# Patient Record
Sex: FEMALE | Race: WHITE | HISPANIC OR LATINO | Employment: UNEMPLOYED | URBAN - METROPOLITAN AREA
[De-identification: names, ages, dates, MRNs, and addresses within clinical notes are randomized per-mention and may not be internally consistent; named-entity substitution may affect disease eponyms.]

---

## 2017-02-23 ENCOUNTER — OFFICE VISIT (OUTPATIENT)
Dept: URGENT CARE | Facility: PHYSICIAN GROUP | Age: 37
End: 2017-02-23
Payer: COMMERCIAL

## 2017-02-23 VITALS
WEIGHT: 197 LBS | OXYGEN SATURATION: 96 % | BODY MASS INDEX: 33.8 KG/M2 | DIASTOLIC BLOOD PRESSURE: 76 MMHG | SYSTOLIC BLOOD PRESSURE: 128 MMHG | HEART RATE: 76 BPM | RESPIRATION RATE: 16 BRPM | TEMPERATURE: 97.9 F

## 2017-02-23 DIAGNOSIS — J01.01 ACUTE RECURRENT MAXILLARY SINUSITIS: ICD-10-CM

## 2017-02-23 DIAGNOSIS — H66.001 ACUTE SUPPURATIVE OTITIS MEDIA OF RIGHT EAR WITHOUT SPONTANEOUS RUPTURE OF TYMPANIC MEMBRANE, RECURRENCE NOT SPECIFIED: ICD-10-CM

## 2017-02-23 PROCEDURE — 99214 OFFICE O/P EST MOD 30 MIN: CPT | Performed by: FAMILY MEDICINE

## 2017-02-23 RX ORDER — SULFAMETHOXAZOLE AND TRIMETHOPRIM 800; 160 MG/1; MG/1
1 TABLET ORAL EVERY 12 HOURS
Qty: 20 TAB | Refills: 0 | Status: SHIPPED | OUTPATIENT
Start: 2017-02-23 | End: 2017-03-05

## 2017-02-23 ASSESSMENT — ENCOUNTER SYMPTOMS
HEADACHES: 1
COUGH: 0
FOCAL WEAKNESS: 0
WEIGHT LOSS: 0
STRIDOR: 0
SENSORY CHANGE: 0
DIZZINESS: 1
EYE REDNESS: 0
SHORTNESS OF BREATH: 0
EYE DISCHARGE: 0
MYALGIAS: 0

## 2017-02-23 NOTE — MR AVS SNAPSHOT
Heidi Mcgowannez   2017 2:45 PM   Office Visit   MRN: [id number]    Department:  [facility]   Dept Phone:  [phone]    Description:  Female : 1980   Provider:  Issac Diaz M.D.           Reason for Visit     Nasal Congestion x 1 week and ear pain      Allergies as of 2017     Allergen Noted Reactions    Inapsine [Droperidol] 2007       Keflex 2007       Morphine 2007       Penicillins 2007         You were diagnosed with     Acute suppurative otitis media of right ear without spontaneous rupture of tympanic membrane, recurrence not specified   [8297354]       Acute recurrent maxillary sinusitis   [[id number]]         Vital Signs     Blood Pressure Pulse Temperature Respirations Weight Oxygen Saturation    128/76 mmHg 76 36.6 °C (97.9 °F) 16 89.359 kg (197 lb) 96%    Last Menstrual Period Smoking Status                10/17/2016 Passive Smoke Exposure - Never Smoker          Basic Information     Date Of Birth Sex Race Ethnicity Preferred Language    1980 Female White  Origin (Kiswahili,Cypriot,Macedonian,Pipo, etc) English      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   2012 - Present    WPW (Marisela-Parkinson-White syndrome) I45.6   2012 - Present    Mild intermittent asthma without complication J45.20   2015 - Present    Situational depression F43.21   2015 - Present    Cough R05   4/15/2016 - Present    Dyspnea R06.00   4/15/2016 - Present      Health Maintenance        Date Due Completion Dates    IMM PNEUMOCOCCAL 19-64 (ADULT) MEDIUM RISK SERIES (1 of 1 - PPSV23) 1997 ---    PAP SMEAR 1999 ---    IMM INFLUENZA (1) 2016 ---    IMM DTaP/Tdap/Td Vaccine (2 - Td) 2024            Current Immunizations     MMR/Varicella Combined Vaccine  Incomplete    Tdap Vaccine 2014  2:15 PM,  Incomplete      Below and/or attached  are the medications your provider expects you to take. Review all of your home medications and newly ordered medications with your provider and/or pharmacist. Follow medication instructions as directed by your provider and/or pharmacist. Please keep your medication list with you and share with your provider. Update the information when medications are discontinued, doses are changed, or new medications (including over-the-counter products) are added; and carry medication information at all times in the event of emergency situations     Allergies:  INAPSINE - (reactions not documented)     KEFLEX - (reactions not documented)     MORPHINE - (reactions not documented)     PENICILLINS - (reactions not documented)               Medications  Valid as of: February 23, 2017 -  3:03 PM    Generic Name Brand Name Tablet Size Instructions for use    Albuterol Sulfate (Aero Soln) albuterol 108 (90 BASE) MCG/ACT Inhale 2 Puffs by mouth every 6 hours as needed for Shortness of Breath.        Albuterol Sulfate (Aero Soln) albuterol 108 (90 BASE) MCG/ACT Inhale 1-2 Puffs by mouth every 6 hours as needed for Shortness of Breath.        Azelastine HCl (Solution) ASTELIN 137 MCG/SPRAY Spray 1 Spray in nose 2 times a day.        Azithromycin (Tab) ZITHROMAX 250 MG Take 2 tabs on first day, then 1 tab po daily for days 2-5.        Budesonide-Formoterol Fumarate (Aerosol) SYMBICORT 160-4.5 MCG/ACT Inhale 2 Puffs by mouth 2 Times a Day.        Clarithromycin (Tab) BIAXIN 500 MG Take 1 Tab by mouth 2 times a day.        Clarithromycin (Tab) BIAXIN 500 MG Take 1 Tab by mouth 2 times a day.        Fluticasone Propionate (Suspension) FLONASE 50 MCG/ACT Spray 2 Sprays in nose every day.        Guaifenesin-Codeine (Solution) ROBITUSSIN -10 mg/5mL Take 1-2 teaspoons at bedtime prn cough.  No driving. Caution drowsy        Ibuprofen   Take 600 mg by mouth every 6 hours as needed.        Methocarbamol (Tab) ROBAXIN 500 MG Take 1 Tab by mouth  3 times a day as needed.        MethylPREDNISolone (Tab) MEDROL DOSPACK 4 MG Take as directed        MethylPREDNISolone (Tablet Therapy Pack) MEDROL DOSEPAK 4 MG Take as directed.        Omeprazole (CAPSULE DELAYED RELEASE) PRILOSEC 20 MG Take 1 Cap by mouth every day.        PredniSONE (Tab) DELTASONE 10 MG 6 tab po QD X 2 days then 5 tab po QD X 2 days then 4 tab PO QD X 2 days then continue taper down to 1 tab po QD X 2 days        PredniSONE (Tab) DELTASONE 10 MG Take 1 tablet 3 times daily for 5 days, then take 1 tablet 2 times daily for 2 days, then take 1 tablet 1 time daily for 1 day, then take 1/2 tablet daily for 1 day.        Sulfamethoxazole-Trimethoprim (Tab) BACTRIM -160 MG Take 1 Tab by mouth every 12 hours for 10 days.        TraMADol HCl (Tab) ULTRAM 50 MG Take 1-2 Tabs by mouth every 6 hours as needed (pain).        .                 Medicines prescribed today were sent to:     [id number] - [place], [place] - [address] [place] [address]    [address] [place] [address] [place] [address]    Phone: [phone] Fax: [phone]    Open 24 Hours?: No      Medication refill instructions:       If your prescription bottle indicates you have medication refills left, it is not necessary to call your provider’s office. Please contact your pharmacy and they will refill your medication.    If your prescription bottle indicates you do not have any refills left, you may request refills at any time through one of the following ways: The online [email or web address] system (except Urgent Care), by calling your provider’s office, or by asking your pharmacy to contact your provider’s office with a refill request. Medication refills are processed only during regular business hours and may not be available until the next business day. Your provider may request additional information or to have a follow-up visit with you prior to refilling your medication.   *Please Note: Medication refills are assigned a new Rx number when refilled  electronically. Your pharmacy may indicate that no refills were authorized even though a new prescription for the same medication is available at the pharmacy. Please request the medicine by name with the pharmacy before contacting your provider for a refill.           [employer] Access Code: [email or web address]  Expires: 3/9/2017 11:56 AM    [employer]  A secure, online tool to manage your health information     [employer]® is a secure, online tool that connects you to your personalized health information from the privacy of your home -- day or night - making it very easy for you to manage your healthcare. Once the activation process is completed, you can even access your medical information using the [employer] sarah, which is available for free in the Apple Sarah store or Google Play store.     [employer] provides the following levels of access (as shown below):   My Chart Features   [facility] Doctor [facility]  Specialists [facility]  Urgent  Care [facility]  Primary Care  Doctor   Email your healthcare team securely and privately 24/7 X X X    Manage appointments: schedule your next appointment; view details of past/upcoming appointments X      Request prescription refills. X      View recent personal medical records, including lab and immunizations X X X X   View health record, including health history, allergies, medications X X X X   Read reports about your outpatient visits, procedures, consult and ER notes X X X X   See your discharge summary, which is a recap of your hospital and/or ER visit that includes your diagnosis, lab results, and care plan. X X       How to register for [employer]:  1. Go to  [email or web address].  2. Click on the Sign Up Now box, which takes you to the New Member Sign Up page. You will need to provide the following information:  a. Enter your [employer] Access Code exactly as it appears at the top of this page. (You will not need to use this code after you’ve completed the sign-up  process. If you do not sign up before the expiration date, you must request a new code.)   b. Enter your date of birth.   c. Enter your home email address.   d. Click Submit, and follow the next screen’s instructions.  3. Create a [employer]t ID. This will be your [employer] login ID and cannot be changed, so think of one that is secure and easy to remember.  4. Create a [employer] password. You can change your password at any time.  5. Enter your Password Reset Question and Answer. This can be used at a later time if you forget your password.   6. Enter your e-mail address. This allows you to receive e-mail notifications when new information is available in [employer].  7. Click Sign Up. You can now view your health information.    For assistance activating your [employer] account, call [phone]

## 2017-02-23 NOTE — PROGRESS NOTES
Subjective:      Heidi Plata is a 36 y.o. female who presents with Nasal Congestion            Sinusitis  This is a new problem. Episode onset: 1 week sinus pressure/drainage, right earache. No fever. No ST. Right anterior neck pain and feels swollen. +PMH sinusitis/no sinus surgery but has been seen by ENT. Using OTC advil with min relief. Occas nyquil with relief.  Associated symptoms include headaches. Pertinent negatives include no coughing, ear pain or shortness of breath.   No other aggravating or alleviating factors.      Review of Systems   Constitutional: Positive for malaise/fatigue. Negative for weight loss.   HENT: Positive for hearing loss. Negative for ear pain.    Eyes: Negative for discharge and redness.   Respiratory: Negative for cough, shortness of breath and stridor.    Musculoskeletal: Negative for myalgias and joint pain.   Skin: Negative for itching and rash.   Neurological: Positive for dizziness and headaches. Negative for sensory change and focal weakness.   .  Medications, Allergies, and current problem list reviewed today in Epic         Objective:     /76 mmHg  Pulse 76  Temp(Src) 36.6 °C (97.9 °F)  Resp 16  Wt 89.359 kg (197 lb)  SpO2 96%  LMP 10/17/2016     Physical Exam   Constitutional: She appears well-developed and well-nourished.   HENT:   Head: Normocephalic and atraumatic.   Right TM red, dull, bulging    Tender right maxillary sinus bilateral.    Eyes: Conjunctivae are normal.   Neck: Neck supple.   Cardiovascular: Normal rate, regular rhythm and normal heart sounds.    Pulmonary/Chest: Effort normal and breath sounds normal.   Lymphadenopathy:     She has cervical adenopathy (tender anterior).   Neurological:   Speech is clear. Patient is appropriate and cooperative.     Skin: Skin is warm and dry. No rash noted.               Assessment/Plan:     1. Acute suppurative otitis media of right ear without spontaneous rupture of tympanic membrane, recurrence not  specified  sulfamethoxazole-trimethoprim (BACTRIM DS) 800-160 MG tablet   2. Acute recurrent maxillary sinusitis  sulfamethoxazole-trimethoprim (BACTRIM DS) 800-160 MG tablet     Nasal saline, coricidin  Differential diagnosis, natural history, supportive care, and indications for immediate follow-up discussed at length.

## 2017-03-21 ENCOUNTER — OFFICE VISIT (OUTPATIENT)
Dept: URGENT CARE | Facility: PHYSICIAN GROUP | Age: 37
End: 2017-03-21
Payer: COMMERCIAL

## 2017-03-21 ENCOUNTER — HOSPITAL ENCOUNTER (OUTPATIENT)
Facility: MEDICAL CENTER | Age: 37
End: 2017-03-21
Attending: EMERGENCY MEDICINE
Payer: COMMERCIAL

## 2017-03-21 ENCOUNTER — HOSPITAL ENCOUNTER (OUTPATIENT)
Dept: RADIOLOGY | Facility: MEDICAL CENTER | Age: 37
End: 2017-03-21
Attending: EMERGENCY MEDICINE

## 2017-03-21 VITALS
TEMPERATURE: 97.9 F | OXYGEN SATURATION: 96 % | DIASTOLIC BLOOD PRESSURE: 90 MMHG | HEART RATE: 82 BPM | RESPIRATION RATE: 16 BRPM | BODY MASS INDEX: 31.74 KG/M2 | WEIGHT: 185 LBS | SYSTOLIC BLOOD PRESSURE: 124 MMHG

## 2017-03-21 DIAGNOSIS — J00 ACUTE NASOPHARYNGITIS: ICD-10-CM

## 2017-03-21 DIAGNOSIS — R10.30 LOWER ABDOMINAL PAIN: ICD-10-CM

## 2017-03-21 DIAGNOSIS — K52.9 ENTEROCOLITIS: ICD-10-CM

## 2017-03-21 DIAGNOSIS — R19.7 DIARRHEA, UNSPECIFIED TYPE: ICD-10-CM

## 2017-03-21 DIAGNOSIS — R10.813 RIGHT LOWER QUADRANT ABDOMINAL TENDERNESS WITHOUT REBOUND TENDERNESS: ICD-10-CM

## 2017-03-21 DIAGNOSIS — Z87.09 HISTORY OF ALLERGIC RHINITIS: ICD-10-CM

## 2017-03-21 LAB
APPEARANCE UR: CLEAR
BASOPHILS # BLD AUTO: 0.04 K/UL (ref 0–0.12)
BASOPHILS NFR BLD AUTO: 0.4 % (ref 0–1.8)
BILIRUB UR STRIP-MCNC: NORMAL MG/DL
COLOR UR AUTO: NORMAL
EOSINOPHIL # BLD: 0.31 K/UL (ref 0–0.51)
EOSINOPHIL NFR BLD AUTO: 2.8 % (ref 0–6.9)
ERYTHROCYTE [DISTWIDTH] IN BLOOD BY AUTOMATED COUNT: 39.8 FL (ref 35.9–50)
ERYTHROCYTE [SEDIMENTATION RATE] IN BLOOD BY WESTERGREN METHOD: 9 MM/HOUR (ref 0–20)
GLUCOSE UR STRIP.AUTO-MCNC: NORMAL MG/DL
HCT VFR BLD AUTO: 42.5 % (ref 37–47)
HGB BLD-MCNC: 15.4 G/DL (ref 12–16)
IMM GRANULOCYTES # BLD AUTO: 0.04 K/UL (ref 0–0.11)
IMM GRANULOCYTES NFR BLD AUTO: 0.4 % (ref 0–0.9)
INT CON NEG: NEGATIVE
INT CON NEG: NEGATIVE
INT CON POS: POSITIVE
INT CON POS: POSITIVE
KETONES UR STRIP.AUTO-MCNC: NORMAL MG/DL
LEUKOCYTE ESTERASE UR QL STRIP.AUTO: NORMAL
LYMPHOCYTES # BLD: 3.24 K/UL (ref 1–4.8)
LYMPHOCYTES NFR BLD AUTO: 29 % (ref 22–41)
MCH RBC QN AUTO: 29.4 PG (ref 27–33)
MCHC RBC AUTO-ENTMCNC: 36.2 G/DL (ref 33.6–35)
MCV RBC AUTO: 81.3 FL (ref 81.4–97.8)
MONOCYTES # BLD: 1.07 K/UL (ref 0–0.85)
MONOCYTES NFR BLD AUTO: 9.6 % (ref 0–13.4)
NEUTROPHILS # BLD: 6.48 K/UL (ref 2–7.15)
NEUTROPHILS NFR BLD AUTO: 57.8 % (ref 44–72)
NITRITE UR QL STRIP.AUTO: NORMAL
NRBC # BLD AUTO: 0 K/UL
NRBC BLD-RTO: 0 /100 WBC
PH UR STRIP.AUTO: 5 [PH] (ref 5–8)
PLATELET # BLD AUTO: 270 K/UL (ref 164–446)
PMV BLD AUTO: 11.2 FL (ref 9–12.9)
POC URINE PREGNANCY TEST: NORMAL
PROT UR QL STRIP: NORMAL MG/DL
RBC # BLD AUTO: 5.23 M/UL (ref 4.2–5.4)
RBC UR QL AUTO: NORMAL
S PYO AG THROAT QL: NORMAL
SP GR UR STRIP.AUTO: 1.01
UROBILINOGEN UR STRIP-MCNC: 5 MG/DL
WBC # BLD AUTO: 11.2 K/UL (ref 4.8–10.8)

## 2017-03-21 PROCEDURE — 87880 STREP A ASSAY W/OPTIC: CPT | Performed by: EMERGENCY MEDICINE

## 2017-03-21 PROCEDURE — 81002 URINALYSIS NONAUTO W/O SCOPE: CPT | Performed by: EMERGENCY MEDICINE

## 2017-03-21 PROCEDURE — 85652 RBC SED RATE AUTOMATED: CPT

## 2017-03-21 PROCEDURE — 700117 HCHG RX CONTRAST REV CODE 255: Performed by: EMERGENCY MEDICINE

## 2017-03-21 PROCEDURE — 74177 CT ABD & PELVIS W/CONTRAST: CPT

## 2017-03-21 PROCEDURE — 99203 OFFICE O/P NEW LOW 30 MIN: CPT | Performed by: EMERGENCY MEDICINE

## 2017-03-21 PROCEDURE — 81025 URINE PREGNANCY TEST: CPT | Performed by: EMERGENCY MEDICINE

## 2017-03-21 PROCEDURE — 85025 COMPLETE CBC W/AUTO DIFF WBC: CPT

## 2017-03-21 RX ADMIN — IOHEXOL 100 ML: 350 INJECTION, SOLUTION INTRAVENOUS at 18:15

## 2017-03-21 ASSESSMENT — ENCOUNTER SYMPTOMS
HEADACHES: 1
FLATUS: 0
SORE THROAT: 1
CONSTIPATION: 0
SINUS PAIN: 1
BELCHING: 0
HEMATOCHEZIA: 0
COUGH: 1
ABDOMINAL PAIN: 1
FEVER: 0
ANOREXIA: 1
NAUSEA: 0
RHINORRHEA: 1
VOMITING: 0
DIARRHEA: 1

## 2017-03-21 NOTE — PROGRESS NOTES
Subjective:      Heidi Plata is a 36 y.o. female who presents with UTI            Abdominal Pain  This is a new problem. The current episode started yesterday. The onset quality is gradual. The problem occurs intermittently. The problem has been unchanged. The pain is located in the suprapubic region. The pain is moderate. The quality of the pain is colicky. The abdominal pain does not radiate. Associated symptoms include anorexia, diarrhea and headaches. Pertinent negatives include no belching, constipation, dysuria, fever, flatus, frequency, hematochezia, hematuria, melena, nausea or vomiting. Associated symptoms comments: Multiple watery stools without hematochezia. The pain is aggravated by urination. The pain is relieved by being still.   URI   This is a new problem. Episode onset: 4 days. The problem has been unchanged. Maximum temperature: initially. Associated symptoms include abdominal pain, congestion, coughing, diarrhea, headaches, a plugged ear sensation, rhinorrhea, sinus pain and a sore throat. Pertinent negatives include no dysuria, ear pain, nausea, rash or vomiting.    family member exposure to strep throat. No history of irritable bowel or diarrheal illnesses.  Son with ongoing diarrhea problem. Had antibiotic course recently. Known allergic rhinitis without significant recent exacerbation    Review of Systems   Constitutional: Negative for fever.   HENT: Positive for congestion, rhinorrhea and sore throat. Negative for ear pain.    Respiratory: Positive for cough.    Gastrointestinal: Positive for abdominal pain, diarrhea and anorexia. Negative for nausea, vomiting, constipation, melena, hematochezia and flatus.   Genitourinary: Positive for urgency. Negative for dysuria, frequency and hematuria.        LNMP 2 weeks ago.Denies pregnancy. No vaginal discharge or bleeding.   Skin: Negative for itching and rash.   Neurological: Positive for headaches.     PMH:  has a past medical history of S/P  ablation of ventricular arrhythmia; Cruz's syndrome; Other acquired deformity of toe; Personal history of venous thrombosis and embolism; Ramirez-Parkinson-White syndrome; Angina; Bronchitis; ASTHMA; Pneumonia; Kidney, horseshoe; PSVT (paroxysmal supraventricular tachycardia) (CMS-HCC); Cardiac arrhythmia; Chickenpox; and Influenza. She also has no past medical history of Diabetes, CATARACT, COPD, Dialysis, or Backpain.  MEDS:   Current outpatient prescriptions:   •  Ibuprofen (ADVIL PO), Take 600 mg by mouth every 6 hours as needed., Disp: , Rfl:   •  clarithromycin (BIAXIN) 500 MG Tab, Take 1 Tab by mouth 2 times a day., Disp: 20 Tab, Rfl: 0  •  MethylPREDNISolone (MEDROL DOSEPAK) 4 MG Tablet Therapy Pack, Take as directed., Disp: 21 Tab, Rfl: 0  •  azelastine (ASTELIN) 137 MCG/SPRAY nasal spray, Spray 1 Spray in nose 2 times a day., Disp: 30 mL, Rfl: 3  •  methocarbamol (ROBAXIN) 500 MG Tab, Take 1 Tab by mouth 3 times a day as needed., Disp: 30 Tab, Rfl: 0  •  tramadol (ULTRAM) 50 MG Tab, Take 1-2 Tabs by mouth every 6 hours as needed (pain)., Disp: 20 Tab, Rfl: 0  •  omeprazole (PRILOSEC) 20 MG delayed-release capsule, Take 1 Cap by mouth every day., Disp: 30 Cap, Rfl: 0  •  budesonide-formoterol (SYMBICORT) 160-4.5 MCG/ACT Aerosol, Inhale 2 Puffs by mouth 2 Times a Day., Disp: 1 Inhaler, Rfl: 11  •  predniSONE (DELTASONE) 10 MG Tab, Take 1 tablet 3 times daily for 5 days, then take 1 tablet 2 times daily for 2 days, then take 1 tablet 1 time daily for 1 day, then take 1/2 tablet daily for 1 day., Disp: 22 Tab, Rfl: 0  •  predniSONE (DELTASONE) 10 MG Tab, 6 tab po QD X 2 days then 5 tab po QD X 2 days then 4 tab PO QD X 2 days then continue taper down to 1 tab po QD X 2 days, Disp: 42 Tab, Rfl: 0  •  clarithromycin (BIAXIN) 500 MG Tab, Take 1 Tab by mouth 2 times a day., Disp: 20 Tab, Rfl: 0  •  methylPREDNISolone (MEDROL DOSPACK) 4 MG Tab, Take as directed, Disp: 21 Tab, Rfl: 0  •  albuterol (VENTOLIN OR  PROVENTIL) 108 (90 BASE) MCG/ACT Aero Soln inhalation aerosol, Inhale 1-2 Puffs by mouth every 6 hours as needed for Shortness of Breath., Disp: 8.5 g, Rfl: 0  •  guaifenesin-codeine (CHERATUSSIN AC) Solution oral solution, Take 1-2 teaspoons at bedtime prn cough.  No driving. Caution drowsy, Disp: 100 mL, Rfl: 0  •  albuterol (VENTOLIN OR PROVENTIL) 108 (90 BASE) MCG/ACT Aero Soln inhalation aerosol, Inhale 2 Puffs by mouth every 6 hours as needed for Shortness of Breath., Disp: 8.5 g, Rfl: 3  •  azithromycin (ZITHROMAX) 250 MG Tab, Take 2 tabs on first day, then 1 tab po daily for days 2-5., Disp: 6 Tab, Rfl: 0  •  fluticasone (FLONASE) 50 MCG/ACT nasal spray, Spray 2 Sprays in nose every day., Disp: 16 g, Rfl: 2  ALLERGIES:   Allergies   Allergen Reactions   • Inapsine [Droperidol]    • Keflex    • Morphine    • Penicillins      SURGHX:   Past Surgical History   Procedure Laterality Date   • Implantable cardioverter defibrillator (icd)     • Grecia by laparoscopy     • Other cardiac surgery       6 ablation   • Gyn surgery  2007     csection   • Other  2003     L BREAST LUMPECTOMY   • Other       gallbladder removed   • Repeat c section  6/18/2014     Performed by Corinne E Capurro, M.D. at [facility]   • Primary c section     • Cholecystectomy       SOCHX:  reports that she has been passively smoking.  She has never used smokeless tobacco. She reports that she does not drink alcohol or use illicit drugs.  FH: family history includes Asthma in her mother and son; Lung Disease in her son.       Objective:     /90 mmHg  Pulse 82  Temp(Src) 36.6 °C (97.9 °F)  Resp 16  Wt 83.915 kg (185 lb)  SpO2 96%  LMP 10/17/2016     Physical Exam   Constitutional: She is oriented to person, place, and time. She appears well-developed and well-nourished. She is cooperative.  Non-toxic appearance. She does not have a sickly appearance. She appears ill. No distress.   HENT:   Head: Normocephalic.   Right Ear:  Tympanic membrane and ear canal normal.   Left Ear: Tympanic membrane and ear canal normal.   Nose: Mucosal edema present. Right sinus exhibits maxillary sinus tenderness and frontal sinus tenderness. Left sinus exhibits maxillary sinus tenderness and frontal sinus tenderness.   Mouth/Throat: Uvula is midline. No trismus in the jaw. No uvula swelling. Posterior oropharyngeal erythema present. No oropharyngeal exudate, posterior oropharyngeal edema or tonsillar abscesses.   Eyes: Conjunctivae are normal. No scleral icterus.   Neck: Trachea normal and phonation normal. Neck supple. No JVD present. No thyromegaly present.   Cardiovascular: Normal rate, regular rhythm and normal heart sounds.    Pulmonary/Chest: Effort normal. She has no decreased breath sounds. She has no wheezes. She has no rhonchi. She has no rales.   Abdominal: Soft. Normal appearance. She exhibits no distension, no abdominal bruit and no mass. Bowel sounds are decreased. There is no hepatosplenomegaly. There is tenderness in the right lower quadrant. There is guarding and tenderness at McBurney's point. There is no rigidity, no rebound, no CVA tenderness and negative Montgomery's sign.   Musculoskeletal:   No significant pedal edema.   Lymphadenopathy:     She has cervical adenopathy.        Right cervical: Superficial cervical adenopathy present. No posterior cervical adenopathy present.       Left cervical: Superficial cervical adenopathy present. No posterior cervical adenopathy present.   Neurological: She is alert and oriented to person, place, and time.   Skin: Skin is warm, dry and intact. No rash noted.   Psychiatric: She has a normal mood and affect. Her speech is normal.               Assessment/Plan:     1. Enterocolitis  Advised need for PCP follow-up  2. Right lower quadrant abdominal tenderness without rebound tenderness  - CBC WITH DIFFERENTIAL; Future  [facility]; Future  - CT-ABDOMEN-PELVIS WITH; per radiologist:   1.  No  acute abnormalities are noted on abdominal CT.  No acute abnormalities are identified on pelvic CT.  2.  Mildly prominent right adnexal and ovarian veins could indicate pelvic congestion syndrome. These veins appear slightly more prominent than on the prior CT.    3. Lower abdominal pain  Negative- POCT Urinalysis  Negative- POCT Pregnancy    4. Diarrhea, unspecified type  - OCCULT BLOOD FECES IMMUNOASSAY; Future  - CDIFF BY PCR; Future  - STOOL WBC'S; Future    5. Acute nasopharyngitis  Negative- POCT Rapid Strep A    6. History of allergic rhinitis

## 2017-03-21 NOTE — MR AVS SNAPSHOT
Heidi Plata   3/21/2017 2:30 PM   Office Visit   MRN: [id number]    Department:  [facility]   Dept Phone:  [phone]    Description:  Female : 1980   Provider:  Rell Davis M.D.           Reason for Visit     UTI lower abd pain/lower back pain, cant urinate that much/ sore throat, sinus congestion      Allergies as of 3/21/2017     Allergen Noted Reactions    Inapsine [Droperidol] 2007       Keflex 2007       Morphine 2007       Penicillins 2007         You were diagnosed with     Enterocolitis   [424330]       Right lower quadrant abdominal tenderness without rebound tenderness   [2808554]       Lower abdominal pain   [053930]       Diarrhea, unspecified type   [7399604]       Acute nasopharyngitis   [750997]       History of allergic rhinitis   [994021]         Vital Signs     Blood Pressure Pulse Temperature Respirations Weight Oxygen Saturation    124/90 mmHg 82 36.6 °C (97.9 °F) 16 83.915 kg (185 lb) 96%    Last Menstrual Period Smoking Status                10/17/2016 Passive Smoke Exposure - Never Smoker          Basic Information     Date Of Birth Sex Race Ethnicity Preferred Language    1980 Female White  Origin (Marshallese,Syrian,Ugandan,South African, etc) English      Your appointments     Mar 21, 2017  5:30 PM   CT BODY WITH with [place] [facility] 1   [facility] [place] [facility]    [address] [place] [address] [address]ky  [facility] [address]   [phone]           Taking medications as regularly scheduled is strongly encouraged.  *For Abdominal CT-Patient needs to  oral contrast and instruction from the department at least 2 hours prior to exam. Patient may  contrast at any imaging facility.              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   2012 - Present    WPW (Marisela-Parkinson-White syndrome) I45.6   2012 - Present    Mild intermittent asthma without  complication J45.20   12/30/2015 - Present    Situational depression F43.21   12/30/2015 - Present    Cough R05   4/15/2016 - Present    Dyspnea R06.00   4/15/2016 - Present      Health Maintenance        Date Due Completion Dates    IMM PNEUMOCOCCAL 19-64 (ADULT) MEDIUM RISK SERIES (1 of 1 - PPSV23) 5/17/1997 ---    PAP SMEAR 5/17/1999 ---    IMM INFLUENZA (1) 9/1/2016 ---    IMM DTaP/Tdap/Td Vaccine (2 - Td) 6/19/2024 6/19/2014            Results     POCT Urinalysis      Component Value Standard Range & Units    POC Color dark yellow Negative    POC Appearance clear Negative    POC Leukocyte Esterase neg Negative    POC Nitrites neg Negative    POC Urobiligen 5.0 Negative (0.2) mg/dL    POC Protein neg Negative mg/dL    POC Urine PH 5.0 5.0 - 8.0    POC Blood neg Negative    POC Specific Gravity 1.015 <1.005 - >1.030    POC Ketones neg Negative mg/dL    POC Biliruben neg Negative mg/dL    POC Glucose neg Negative mg/dL                POCT Pregnancy      Component Value Standard Range & Units    POC Urine Pregnancy Test neg Negative    Internal Control Positive Positive     Internal Control Negative Negative                         Current Immunizations     MMR/Varicella Combined Vaccine  Incomplete    Tdap Vaccine 6/19/2014  2:15 PM,  Incomplete      Below and/or attached are the medications your provider expects you to take. Review all of your home medications and newly ordered medications with your provider and/or pharmacist. Follow medication instructions as directed by your provider and/or pharmacist. Please keep your medication list with you and share with your provider. Update the information when medications are discontinued, doses are changed, or new medications (including over-the-counter products) are added; and carry medication information at all times in the event of emergency situations     Allergies:  INAPSINE - (reactions not documented)     KEFLEX - (reactions not documented)     MORPHINE -  (reactions not documented)     PENICILLINS - (reactions not documented)               Medications  Valid as of: March 21, 2017 -  3:35 PM    Generic Name Brand Name Tablet Size Instructions for use    Albuterol Sulfate (Aero Soln) albuterol 108 (90 BASE) MCG/ACT Inhale 2 Puffs by mouth every 6 hours as needed for Shortness of Breath.        Albuterol Sulfate (Aero Soln) albuterol 108 (90 BASE) MCG/ACT Inhale 1-2 Puffs by mouth every 6 hours as needed for Shortness of Breath.        Azelastine HCl (Solution) ASTELIN 137 MCG/SPRAY Spray 1 Spray in nose 2 times a day.        Azithromycin (Tab) ZITHROMAX 250 MG Take 2 tabs on first day, then 1 tab po daily for days 2-5.        Budesonide-Formoterol Fumarate (Aerosol) SYMBICORT 160-4.5 MCG/ACT Inhale 2 Puffs by mouth 2 Times a Day.        Clarithromycin (Tab) BIAXIN 500 MG Take 1 Tab by mouth 2 times a day.        Clarithromycin (Tab) BIAXIN 500 MG Take 1 Tab by mouth 2 times a day.        Fluticasone Propionate (Suspension) FLONASE 50 MCG/ACT Spray 2 Sprays in nose every day.        Guaifenesin-Codeine (Solution) ROBITUSSIN -10 mg/5mL Take 1-2 teaspoons at bedtime prn cough.  No driving. Caution drowsy        Ibuprofen   Take 600 mg by mouth every 6 hours as needed.        Methocarbamol (Tab) ROBAXIN 500 MG Take 1 Tab by mouth 3 times a day as needed.        MethylPREDNISolone (Tab) MEDROL DOSPACK 4 MG Take as directed        MethylPREDNISolone (Tablet Therapy Pack) MEDROL DOSEPAK 4 MG Take as directed.        Omeprazole (CAPSULE DELAYED RELEASE) PRILOSEC 20 MG Take 1 Cap by mouth every day.        PredniSONE (Tab) DELTASONE 10 MG 6 tab po QD X 2 days then 5 tab po QD X 2 days then 4 tab PO QD X 2 days then continue taper down to 1 tab po QD X 2 days        PredniSONE (Tab) DELTASONE 10 MG Take 1 tablet 3 times daily for 5 days, then take 1 tablet 2 times daily for 2 days, then take 1 tablet 1 time daily for 1 day, then take 1/2 tablet daily for 1 day.         TraMADol HCl (Tab) ULTRAM 50 MG Take 1-2 Tabs by mouth every 6 hours as needed (pain).        .                 Medicines prescribed today were sent to:     [id number] - [place], [place] - [address] [place] [address]    [address] [place] [address] [place] [address]    Phone: [phone] Fax: [phone]    Open 24 Hours?: No      Medication refill instructions:       If your prescription bottle indicates you have medication refills left, it is not necessary to call your provider’s office. Please contact your pharmacy and they will refill your medication.    If your prescription bottle indicates you do not have any refills left, you may request refills at any time through one of the following ways: The online [employer] system (except Urgent Care), by calling your provider’s office, or by asking your pharmacy to contact your provider’s office with a refill request. Medication refills are processed only during regular business hours and may not be available until the next business day. Your provider may request additional information or to have a follow-up visit with you prior to refilling your medication.   *Please Note: Medication refills are assigned a new Rx number when refilled electronically. Your pharmacy may indicate that no refills were authorized even though a new prescription for the same medication is available at the pharmacy. Please request the medicine by name with the pharmacy before contacting your provider for a refill.        Your To Do List     Future Labs/Procedures Complete By Expires    CBC WITH DIFFERENTIAL  As directed 3/21/2018    CDIFF BY PCR  As directed 3/22/2017    CT-ABDOMEN-PELVIS WITH  As directed 3/21/2018    OCCULT BLOOD FECES IMMUNOASSAY  As directed 3/21/2018    STOOL WBC'S  As directed 3/21/2018    WESTERGREN SED RATE  As directed 3/21/2018         [employer] Access Code: [email or web address]  Expires: 4/9/2017  2:35 PM    [employer]  A secure, online tool to manage your health information     [facility]’s  Conjure® is a secure, online tool that connects you to your personalized health information from the privacy of your home -- day or night - making it very easy for you to manage your healthcare. Once the activation process is completed, you can even access your medical information using the Conjure sarah, which is available for free in the Apple Sarah store or Google Play store.     Conjure provides the following levels of access (as shown below):   My Chart Features   [facility] Doctor [facility]  Specialists [facility]  Urgent  Care Non-[facility]  Primary Care  Doctor   Email your healthcare team securely and privately 24/7 X X X    Manage appointments: schedule your next appointment; view details of past/upcoming appointments X      Request prescription refills. X      View recent personal medical records, including lab and immunizations X X X X   View health record, including health history, allergies, medications X X X X   Read reports about your outpatient visits, procedures, consult and ER notes X X X X   See your discharge summary, which is a recap of your hospital and/or ER visit that includes your diagnosis, lab results, and care plan. X X       How to register for Conjure:  1. Go to  [email or web address].  2. Click on the Sign Up Now box, which takes you to the New Member Sign Up page. You will need to provide the following information:  a. Enter your Conjure Access Code exactly as it appears at the top of this page. (You will not need to use this code after you’ve completed the sign-up process. If you do not sign up before the expiration date, you must request a new code.)   b. Enter your date of birth.   c. Enter your home email address.   d. Click Submit, and follow the next screen’s instructions.  3. Create a Conjure ID. This will be your Conjure login ID and cannot be changed, so think of one that is secure and easy to remember.  4. Create a Conjure password. You can change your password at any  time.  5. Enter your Password Reset Question and Answer. This can be used at a later time if you forget your password.   6. Enter your e-mail address. This allows you to receive e-mail notifications when new information is available in [employer].  7. Click Sign Up. You can now view your health information.    For assistance activating your [employer] account, call [phone]

## 2017-03-22 NOTE — PATIENT INSTRUCTIONS
"Go to the nearest hospital Emergency Department, or call 911, if any worsening condition.  You should contact a primary care provider for follow-up as soon as available.Test results will be available in the next 1-2 days; the clinic will contact you with results.    Upper Respiratory Infection, Adult  Most upper respiratory infections (URIs) are caused by a virus. A URI affects the nose, throat, and upper air passages. The most common type of URI is often called \"the common cold.\"  HOME CARE   · Take medicines only as told by your doctor.  · Gargle warm saltwater or take cough drops to comfort your throat as told by your doctor.  · Use a warm mist humidifier or inhale steam from a shower to increase air moisture. This may make it easier to breathe.  · Drink enough fluid to keep your pee (urine) clear or pale yellow.  · Eat soups and other clear broths.  · Have a healthy diet.  · Rest as needed.  · Go back to work when your fever is gone or your doctor says it is okay.  ¨ You may need to stay home longer to avoid giving your URI to others.  ¨ You can also wear a face mask and wash your hands often to prevent spread of the virus.  · Use your inhaler more if you have asthma.  · Do not use any tobacco products, including cigarettes, chewing tobacco, or electronic cigarettes. If you need help quitting, ask your doctor.  GET HELP IF:  · You are getting worse, not better.  · Your symptoms are not helped by medicine.  · You have chills.  · You are getting more short of breath.  · You have brown or red mucus.  · You have yellow or brown discharge from your nose.  · You have pain in your face, especially when you bend forward.  · You have a fever.  · You have puffy (swollen) neck glands.  · You have pain while swallowing.  · You have white areas in the back of your throat.  GET HELP RIGHT AWAY IF:   · You have very bad or constant:  ¨ Headache.  ¨ Ear pain.  ¨ Pain in your forehead, behind your eyes, and over your cheekbones " (sinus pain).  ¨ Chest pain.  · You have long-lasting (chronic) lung disease and any of the following:  ¨ Wheezing.  ¨ Long-lasting cough.  ¨ Coughing up blood.  ¨ A change in your usual mucus.  · You have a stiff neck.  · You have changes in your:  ¨ Vision.  ¨ Hearing.  ¨ Thinking.  ¨ Mood.  MAKE SURE YOU:   · Understand these instructions.  · Will watch your condition.  · Will get help right away if you are not doing well or get worse.     This information is not intended to replace advice given to you by your health care provider. Make sure you discuss any questions you have with your health care provider.     Document Released: 06/05/2009 Document Revised: 05/03/2016 Document Reviewed: 03/25/2015  [employer] Interactive Patient Education ©2016 [employer].  Food Choices to Help Relieve Diarrhea, Adult  When you have diarrhea, the foods you eat and your eating habits are very important. Choosing the right foods and drinks can help relieve diarrhea. Also, because diarrhea can last up to 7 days, you need to replace lost fluids and electrolytes (such as sodium, potassium, and chloride) in order to help prevent dehydration.   WHAT GENERAL GUIDELINES DO I NEED TO FOLLOW?  · Slowly drink 1 cup (8 oz) of fluid for each episode of diarrhea. If you are getting enough fluid, your urine will be clear or pale yellow.  · Eat starchy foods. Some good choices include white rice, white toast, pasta, low-fiber cereal, baked potatoes (without the skin), saltine crackers, and bagels.  · Avoid large servings of any cooked vegetables.  · Limit fruit to two servings per day. A serving is ½ cup or 1 small piece.  · Choose foods with less than 2 g of fiber per serving.  · Limit fats to less than 8 tsp (38 g) per day.  · Avoid fried foods.  · Eat foods that have probiotics in them. Probiotics can be found in certain dairy products.  · Avoid foods and beverages that may increase the speed at which food moves through the stomach and  intestines (gastrointestinal tract). Things to avoid include:  ¨ High-fiber foods, such as dried fruit, raw fruits and vegetables, nuts, seeds, and whole grain foods.  ¨ Spicy foods and high-fat foods.  ¨ Foods and beverages sweetened with high-fructose corn syrup, honey, or sugar alcohols such as xylitol, sorbitol, and mannitol.  WHAT FOODS ARE RECOMMENDED?  Grains  White rice. White, French, or mickey breads (fresh or toasted), including plain rolls, buns, or bagels. White pasta. Saltine, soda, or cynthia crackers. Pretzels. Low-fiber cereal. Cooked cereals made with water (such as cornmeal, farina, or cream cereals). Plain muffins. Matzo. Trish toast. Zwieback.   Vegetables  Potatoes (without the skin). Strained tomato and vegetable juices. Most well-cooked and canned vegetables without seeds. Tender lettuce.  Fruits  Cooked or canned applesauce, apricots, cherries, fruit cocktail, grapefruit, peaches, pears, or plums. Fresh bananas, apples without skin, cherries, grapes, cantaloupe, grapefruit, peaches, oranges, or plums.   Meat and Other Protein Products  Baked or boiled chicken. Eggs. Tofu. Fish. Seafood. Smooth peanut butter. Ground or well-cooked tender beef, ham, veal, lamb, pork, or poultry.   Dairy  Plain yogurt, kefir, and unsweetened liquid yogurt. Lactose-free milk, buttermilk, or soy milk. Plain hard cheese.  Beverages  Sport drinks. Clear broths. Diluted fruit juices (except prune). Regular, caffeine-free sodas such as ginger ale. Water. Decaffeinated teas. Oral rehydration solutions. Sugar-free beverages not sweetened with sugar alcohols.  Other  Bouillon, broth, or soups made from recommended foods.   The items listed above may not be a complete list of recommended foods or beverages. Contact your dietitian for more options.  WHAT FOODS ARE NOT RECOMMENDED?  Grains  Whole grain, whole wheat, bran, or rye breads, rolls, pastas, crackers, and cereals. Wild or brown rice. Cereals that contain more than 2  g of fiber per serving. Corn tortillas or taco shells. Cooked or dry oatmeal. Granola. Popcorn.  Vegetables  Raw vegetables. Cabbage, broccoli, Kinney sprouts, artichokes, baked beans, beet greens, corn, kale, legumes, peas, sweet potatoes, and yams. Potato skins. Cooked spinach and cabbage.  Fruits  Dried fruit, including raisins and dates. Raw fruits. Stewed or dried prunes. Fresh apples with skin, apricots, mangoes, pears, raspberries, and strawberries.   Meat and Other Protein Products  Logan peanut butter. Nuts and seeds. Beans and lentils. Banda.   Dairy  High-fat cheeses. Milk, chocolate milk, and beverages made with milk, such as milk shakes. Cream. Ice cream.  Sweets and Desserts  Sweet rolls, doughnuts, and sweet breads. Pancakes and waffles.  Fats and Oils  Butter. Cream sauces. Margarine. Salad oils. Plain salad dressings. Olives. Avocados.   Beverages  Caffeinated beverages (such as coffee, tea, soda, or energy drinks). Alcoholic beverages. Fruit juices with pulp. Prune juice. Soft drinks sweetened with high-fructose corn syrup or sugar alcohols.  Other  Coconut. Hot sauce. Chili powder. Mayonnaise. Gravy. Cream-based or milk-based soups.   The items listed above may not be a complete list of foods and beverages to avoid. Contact your dietitian for more information.  WHAT SHOULD I DO IF I BECOME DEHYDRATED?  Diarrhea can sometimes lead to dehydration. Signs of dehydration include dark urine and dry mouth and skin. If you think you are dehydrated, you should rehydrate with an oral rehydration solution. These solutions can be purchased at pharmacies, retail stores, or online.   Drink ½-1 cup (120-240 mL) of oral rehydration solution each time you have an episode of diarrhea. If drinking this amount makes your diarrhea worse, try drinking smaller amounts more often. For example, drink 1-3 tsp (5-15 mL) every 5-10 minutes.   A general rule for staying hydrated is to drink 1½-2 L of fluid per day. Talk to  your health care provider about the specific amount you should be drinking each day. Drink enough fluids to keep your urine clear or pale yellow.     This information is not intended to replace advice given to you by your health care provider. Make sure you discuss any questions you have with your health care provider.     Document Released: 03/09/2005 Document Revised: 01/08/2016 Document Reviewed: 11/10/2014  [employer] Interactive Patient Education ©2016 [employer] Inc.  Abdominal Pain, Adult  Many things can cause abdominal pain. Usually, abdominal pain is not caused by a disease and will improve without treatment. It can often be observed and treated at home. Your health care provider will do a physical exam and possibly order blood tests and X-rays to help determine the seriousness of your pain. However, in many cases, more time must pass before a clear cause of the pain can be found. Before that point, your health care provider may not know if you need more testing or further treatment.  HOME CARE INSTRUCTIONS   Monitor your abdominal pain for any changes. The following actions may help to alleviate any discomfort you are experiencing:  · Only take over-the-counter or prescription medicines as directed by your health care provider.  · Do not take laxatives unless directed to do so by your health care provider.  · Try a clear liquid diet (broth, tea, or water) as directed by your health care provider. Slowly move to a bland diet as tolerated.  SEEK MEDICAL CARE IF:  · You have unexplained abdominal pain.  · You have abdominal pain associated with nausea or diarrhea.  · You have pain when you urinate or have a bowel movement.  · You experience abdominal pain that wakes you in the night.  · You have abdominal pain that is worsened or improved by eating food.  · You have abdominal pain that is worsened with eating fatty foods.  · You have a fever.  SEEK IMMEDIATE MEDICAL CARE IF:   · Your pain does not go away within  2 hours.  · You keep throwing up (vomiting).  · Your pain is felt only in portions of the abdomen, such as the right side or the left lower portion of the abdomen.  · You pass bloody or black tarry stools.  MAKE SURE YOU:  · Understand these instructions.    · Will watch your condition.    · Will get help right away if you are not doing well or get worse.       This information is not intended to replace advice given to you by your health care provider. Make sure you discuss any questions you have with your health care provider.     Document Released: 09/27/2006 Document Revised: 01/08/2016 Document Reviewed: 08/27/2014  [employer] Interactive Patient Education ©2016 Elsevier Inc.

## 2017-06-29 ENCOUNTER — OFFICE VISIT (OUTPATIENT)
Dept: MEDICAL GROUP | Facility: PHYSICIAN GROUP | Age: 37
End: 2017-06-29
Payer: COMMERCIAL

## 2017-06-29 VITALS
OXYGEN SATURATION: 98 % | SYSTOLIC BLOOD PRESSURE: 104 MMHG | BODY MASS INDEX: 32.3 KG/M2 | RESPIRATION RATE: 14 BRPM | HEART RATE: 82 BPM | WEIGHT: 189.2 LBS | TEMPERATURE: 98.1 F | HEIGHT: 64 IN | DIASTOLIC BLOOD PRESSURE: 70 MMHG

## 2017-06-29 DIAGNOSIS — J45.20 MILD INTERMITTENT ASTHMA WITHOUT COMPLICATION: ICD-10-CM

## 2017-06-29 DIAGNOSIS — Z13.6 SCREENING FOR CARDIOVASCULAR, RESPIRATORY, AND GENITOURINARY DISEASES: ICD-10-CM

## 2017-06-29 DIAGNOSIS — Z13.89 SCREENING FOR CARDIOVASCULAR, RESPIRATORY, AND GENITOURINARY DISEASES: ICD-10-CM

## 2017-06-29 DIAGNOSIS — R53.83 FATIGUE, UNSPECIFIED TYPE: ICD-10-CM

## 2017-06-29 DIAGNOSIS — Z13.83 SCREENING FOR CARDIOVASCULAR, RESPIRATORY, AND GENITOURINARY DISEASES: ICD-10-CM

## 2017-06-29 DIAGNOSIS — Z13.21 ENCOUNTER FOR VITAMIN DEFICIENCY SCREENING: ICD-10-CM

## 2017-06-29 DIAGNOSIS — Z82.49 FAMILY HISTORY OF BLOOD CLOTS: ICD-10-CM

## 2017-06-29 DIAGNOSIS — Z13.29 THYROID DISORDER SCREENING: ICD-10-CM

## 2017-06-29 PROCEDURE — 99214 OFFICE O/P EST MOD 30 MIN: CPT | Performed by: NURSE PRACTITIONER

## 2017-06-29 RX ORDER — ALBUTEROL SULFATE 90 UG/1
2 AEROSOL, METERED RESPIRATORY (INHALATION) EVERY 6 HOURS PRN
Qty: 8.5 G | Refills: 3 | Status: SHIPPED | OUTPATIENT
Start: 2017-06-29 | End: 2018-02-07

## 2017-06-29 ASSESSMENT — PATIENT HEALTH QUESTIONNAIRE - PHQ9: CLINICAL INTERPRETATION OF PHQ2 SCORE: 0

## 2017-06-29 NOTE — PROGRESS NOTES
Chief Complaint   Patient presents with   • Establish Care   • Orders Needed     labs        HPI:    Heidi Plata is a 37 y.o. female here to establish care and to discuss the following:    Fatigue  Patient reports abnormal fatigue for several years, worsening over the past 3-4 months. She has a 2-year-old at home which contributes to lack of sleep.    Mild intermittent asthma  Chronic condition: Patient has had asthma since childhood. She uses albuterol as needed on rare occasion. She denies wheezing, dyspnea, cough or chest tightness. Symptoms worsen with cold weather, mold or smoke. She is requesting refill on medication today.          Current medicines (including changes today)  Current Outpatient Prescriptions   Medication Sig Dispense Refill   • albuterol 108 (90 BASE) MCG/ACT Aero Soln inhalation aerosol Inhale 2 Puffs by mouth every 6 hours as needed for Shortness of Breath. 8.5 g 3     No current facility-administered medications for this visit.     She  has a past medical history of S/P ablation of ventricular arrhythmia; Cruz's syndrome; Other acquired deformity of toe; Personal history of venous thrombosis and embolism; Ramirez-Parkinson-White syndrome; Angina; Bronchitis; ASTHMA; Pneumonia; Kidney, horseshoe; PSVT (paroxysmal supraventricular tachycardia) (CMS-HCC); Cardiac arrhythmia; Chickenpox; Influenza; and WPW (Marisela-Parkinson-White syndrome). She also has no past medical history of Diabetes, CATARACT, COPD, Dialysis, or Backpain.  She  has past surgical history that includes implantable cardioverter defibrillator (icd); qian by laparoscopy; other cardiac surgery; gyn surgery (2007); other (2003); other; repeat c section (6/18/2014); primary c section; cholecystectomy; and other.  Social History   Substance Use Topics   • Smoking status: Never Smoker    • Smokeless tobacco: Never Used   • Alcohol Use: No     Social History     Social History Narrative     Family History   Problem Relation Age of  "Onset   • Asthma Mother    • Lung Disease Mother      PE   • Other Mother      clotting disease   • Thyroid Mother    • Lung Disease Son      sees pediatric pulmonologist   • Asthma Son    • Diabetes Father    • Thyroid Sister    • Diabetes Maternal Aunt    • Diabetes Paternal Aunt    • Other Maternal Grandfather      CHF   • Diabetes Paternal Grandmother    • Psychiatry Paternal Grandmother      alzheimers     Family Status   Relation Status Death Age   • Mother Alive      obese   • Son Alive    • Father Alive    • Sister Alive    • Sister     • Son Alive    • Maternal Aunt Alive    • Maternal Grandfather           ROS  Review of Systems   Constitutional: Negative for fever, chills, weight loss. Positive for malaise/fatigue.   HENT: Negative for ear pain, nosebleeds, congestion, sore throat and neck pain.    Eyes: Negative for blurred vision.   Respiratory: Negative for cough, sputum production, shortness of breath and wheezing.    Cardiovascular: Negative for chest pain, palpitations,  and leg swelling.   Gastrointestinal: Negative for heartburn, nausea, vomiting, diarrhea and abdominal pain.   Genitourinary: Negative for dysuria, urgency and frequency.   Musculoskeletal: Negative for myalgias, back pain and joint pain.   Skin: Negative for rash and itching.   Neurological: Negative for dizziness, tingling, tremors, sensory change, focal weakness and headaches.   Endo/Heme/Allergies: Does not bruise/bleed easily.   Psychiatric/Behavioral: Negative for depression, anxiety, suicidal ideas, insomnia and memory loss.      All other systems reviewed and are negative except as in HPI.    Health Maintenance:  Pap smear:   [facility]  Immunizations: up to date           Objective:     Blood pressure 104/70, pulse 82, temperature 36.7 °C (98.1 °F), resp. rate 14, height 1.626 m (5' 4.02\"), weight 85.821 kg (189 lb 3.2 oz), last menstrual period 2017, SpO2 98 %, not currently breastfeeding. " Body mass index is 32.46 kg/(m^2).  Physical Exam:  Constitutional: Alert, no distress.  Skin: Warm, dry, good turgor, no rashes in visible areas.  Eye: Equal, round and reactive, conjunctiva clear, lids normal.  ENMT: Lips without lesions, good dentition, oropharynx clear. Ear canals are clear, TMs within normal limits bilaterally.   Neck: Trachea midline, no masses, no thyromegaly. No cervical or supraclavicular lymphadenopathy.  Respiratory: Unlabored respiratory effort, lungs clear to auscultation, no wheezes, no ronchi.  Cardiovascular: Normal S1, S2, no murmur, no edema.  Abdomen: Soft, non-tender, no masses, no hepatosplenomegaly.  Psych: Alert and oriented x3, normal affect and mood.  MS: Ambulates independently with steady gait. Has full range of motion of all extremities and spine.  Neuro: Cranial nerves intact. DTRs within normal limits. No neurological deficits.        Assessment and Plan:   The following treatment plan was discussed   1. Family history of blood clots  CBC WITH DIFFERENTIAL   2. Fatigue, unspecified type     3. Mild intermittent asthma without complication  albuterol 108 (90 BASE) MCG/ACT Aero Soln inhalation aerosol    Controlled, has albuterol on hand as needed   4. Screening for cardiovascular, respiratory, and genitourinary diseases  COMP METABOLIC PANEL    LIPID PROFILE   5. Thyroid disorder screening  TSH WITH REFLEX TO FT4   6. Encounter for vitamin deficiency screening  VITAMIN D,25 HYDROXY     Records requested.  Followup: Return if symptoms worsen or fail to improve, for pending labs.     Please note that this dictation was created using voice recognition software. I have made every reasonable attempt to correct obvious errors, but I expect that there are errors of grammar and possibly content that I did not discover before finalizing the note.

## 2017-06-29 NOTE — ASSESSMENT & PLAN NOTE
Patient reports abnormal fatigue for several years, worsening over the past 3-4 months. She has a 2-year-old at home which contributes to lack of sleep.

## 2017-06-29 NOTE — MR AVS SNAPSHOT
"        Heidi Farfanz   2017 9:00 AM   Office Visit   MRN: [id number]    Department:  [facility]   Dept Phone:  [phone]    Description:  Female : 1980   Provider:  KALEIGH Mcdermott           Reason for Visit     Establish Care     Orders Needed labs       Allergies as of 2017     Allergen Noted Reactions    Inapsine [Droperidol] 2007       Keflex 2007       Morphine 2007       Penicillins 2007         You were diagnosed with     Family history of blood clots   [043782]       Fatigue, unspecified type   [3334986]       Mild intermittent asthma without complication   [592491]   Controlled, has albuterol on hand as needed    Screening for cardiovascular, respiratory, and genitourinary diseases   [738143]       Thyroid disorder screening   [829769]       Encounter for vitamin deficiency screening   [168803]         Vital Signs     Blood Pressure Pulse Temperature Respirations Height Weight    104/70 mmHg 82 36.7 °C (98.1 °F) 14 1.626 m (5' 4.02\") 85.821 kg (189 lb 3.2 oz)    Body Mass Index Oxygen Saturation Last Menstrual Period Breastfeeding? Smoking Status       32.46 kg/m2 98% 2017 No Never Smoker        Basic Information     Date Of Birth Sex Race Ethnicity Preferred Language    1980 Female White  Origin (Citizen of [place]Citizen of [place] English      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   2012 - Present    WPW (Marisela-Parkinson-White syndrome) I45.6   2012 - Present    Situational depression F43.21   2015 - Present    Family history of blood clots Z82.49   2017 - Present    Fatigue R53.83   2017 - Present    Mild intermittent asthma J45.20   2017 - Present      Health Maintenance        Date Due Completion Dates    IMM PNEUMOCOCCAL 19-64 (ADULT) MEDIUM RISK SERIES (1 of 1 - PPSV23) 1997 ---    PAP SMEAR " 5/17/1999 ---    IMM DTaP/Tdap/Td Vaccine (2 - Td) 6/19/2024 6/19/2014            Current Immunizations     MMR/Varicella Combined Vaccine  Incomplete    Tdap Vaccine 6/19/2014  2:15 PM,  Incomplete      Below and/or attached are the medications your provider expects you to take. Review all of your home medications and newly ordered medications with your provider and/or pharmacist. Follow medication instructions as directed by your provider and/or pharmacist. Please keep your medication list with you and share with your provider. Update the information when medications are discontinued, doses are changed, or new medications (including over-the-counter products) are added; and carry medication information at all times in the event of emergency situations     Allergies:  INAPSINE - (reactions not documented)     KEFLEX - (reactions not documented)     MORPHINE - (reactions not documented)     PENICILLINS - (reactions not documented)               Medications  Valid as of: June 29, 2017 -  9:48 AM    Generic Name Brand Name Tablet Size Instructions for use    Albuterol Sulfate (Aero Soln) albuterol 108 (90 BASE) MCG/ACT Inhale 2 Puffs by mouth every 6 hours as needed for Shortness of Breath.        .                 Medicines prescribed today were sent to:     [id number] - [place], [place] - [address]    [address] [place] [address]    Phone: [phone] Fax: [phone]    Open 24 Hours?: No      Medication refill instructions:       If your prescription bottle indicates you have medication refills left, it is not necessary to call your provider’s office. Please contact your pharmacy and they will refill your medication.    If your prescription bottle indicates you do not have any refills left, you may request refills at any time through one of the following ways: The online [employer] system (except Urgent Care), by calling your provider’s office, or by asking your pharmacy to contact your provider’s  office with a refill request. Medication refills are processed only during regular business hours and may not be available until the next business day. Your provider may request additional information or to have a follow-up visit with you prior to refilling your medication.   *Please Note: Medication refills are assigned a new Rx number when refilled electronically. Your pharmacy may indicate that no refills were authorized even though a new prescription for the same medication is available at the pharmacy. Please request the medicine by name with the pharmacy before contacting your provider for a refill.        Your To Do List     Future Labs/Procedures Complete By Expires    CBC WITH DIFFERENTIAL  As directed 6/30/2018    COMP METABOLIC PANEL  As directed 6/30/2018    LIPID PROFILE  As directed 6/30/2018    TSH WITH REFLEX TO FT4  As directed 6/29/2018    VITAMIN D,25 HYDROXY  As directed 6/30/2018         [employer] Access Code: [email or web address]  Expires: 7/29/2017  9:40 AM    [employer]  A secure, online tool to manage your health information     [employer]’s [employer]® is a secure, online tool that connects you to your personalized health information from the privacy of your home -- day or night - making it very easy for you to manage your healthcare. Once the activation process is completed, you can even access your medical information using the [employer] sarah, which is available for free in the Apple Sarah store or Google Play store.     [employer] provides the following levels of access (as shown below):   My Chart Features   [facility] Doctor [facility]  Specialists [facility]  Urgent  Care [facility]  Primary Care  Doctor   Email your healthcare team securely and privately 24/7 X X X    Manage appointments: schedule your next appointment; view details of past/upcoming appointments X      Request prescription refills. X      View recent personal medical records, including lab and immunizations X X X X   View health  record, including health history, allergies, medications X X X X   Read reports about your outpatient visits, procedures, consult and ER notes X X X X   See your discharge summary, which is a recap of your hospital and/or ER visit that includes your diagnosis, lab results, and care plan. X X       How to register for [employer]:  1. Go to  [email or web address].  2. Click on the Sign Up Now box, which takes you to the New Member Sign Up page. You will need to provide the following information:  a. Enter your [employer] Access Code exactly as it appears at the top of this page. (You will not need to use this code after you’ve completed the sign-up process. If you do not sign up before the expiration date, you must request a new code.)   b. Enter your date of birth.   c. Enter your home email address.   d. Click Submit, and follow the next screen’s instructions.  3. Create a [employer] ID. This will be your [employer] login ID and cannot be changed, so think of one that is secure and easy to remember.  4. Create a [employer] password. You can change your password at any time.  5. Enter your Password Reset Question and Answer. This can be used at a later time if you forget your password.   6. Enter your e-mail address. This allows you to receive e-mail notifications when new information is available in [employer].  7. Click Sign Up. You can now view your health information.    For assistance activating your [employer] account, call [phone]

## 2017-06-29 NOTE — ASSESSMENT & PLAN NOTE
Chronic condition: Patient has had asthma since childhood. She uses albuterol as needed on rare occasion. She denies wheezing, dyspnea, cough or chest tightness. Symptoms worsen with cold weather, mold or smoke. She is requesting refill on medication today.

## 2017-07-01 ENCOUNTER — HOSPITAL ENCOUNTER (OUTPATIENT)
Dept: LAB | Facility: MEDICAL CENTER | Age: 37
End: 2017-07-01
Attending: NURSE PRACTITIONER
Payer: COMMERCIAL

## 2017-07-01 DIAGNOSIS — Z13.83 SCREENING FOR CARDIOVASCULAR, RESPIRATORY, AND GENITOURINARY DISEASES: ICD-10-CM

## 2017-07-01 DIAGNOSIS — Z13.29 THYROID DISORDER SCREENING: ICD-10-CM

## 2017-07-01 DIAGNOSIS — Z13.6 SCREENING FOR CARDIOVASCULAR, RESPIRATORY, AND GENITOURINARY DISEASES: ICD-10-CM

## 2017-07-01 DIAGNOSIS — Z13.21 ENCOUNTER FOR VITAMIN DEFICIENCY SCREENING: ICD-10-CM

## 2017-07-01 DIAGNOSIS — Z82.49 FAMILY HISTORY OF BLOOD CLOTS: ICD-10-CM

## 2017-07-01 DIAGNOSIS — Z13.89 SCREENING FOR CARDIOVASCULAR, RESPIRATORY, AND GENITOURINARY DISEASES: ICD-10-CM

## 2017-07-01 LAB
25(OH)D3 SERPL-MCNC: 26 NG/ML (ref 30–100)
ALBUMIN SERPL BCP-MCNC: 4.1 G/DL (ref 3.2–4.9)
ALBUMIN/GLOB SERPL: 1.4 G/DL
ALP SERPL-CCNC: 46 U/L (ref 30–99)
ALT SERPL-CCNC: 14 U/L (ref 2–50)
ANION GAP SERPL CALC-SCNC: 6 MMOL/L (ref 0–11.9)
AST SERPL-CCNC: 13 U/L (ref 12–45)
BASOPHILS # BLD AUTO: 0.8 % (ref 0–1.8)
BASOPHILS # BLD: 0.06 K/UL (ref 0–0.12)
BILIRUB SERPL-MCNC: 1.9 MG/DL (ref 0.1–1.5)
BUN SERPL-MCNC: 14 MG/DL (ref 8–22)
CALCIUM SERPL-MCNC: 9.2 MG/DL (ref 8.5–10.5)
CHLORIDE SERPL-SCNC: 107 MMOL/L (ref 96–112)
CHOLEST SERPL-MCNC: 149 MG/DL (ref 100–199)
CO2 SERPL-SCNC: 24 MMOL/L (ref 20–33)
CREAT SERPL-MCNC: 0.52 MG/DL (ref 0.5–1.4)
EOSINOPHIL # BLD AUTO: 0.22 K/UL (ref 0–0.51)
EOSINOPHIL NFR BLD: 2.8 % (ref 0–6.9)
ERYTHROCYTE [DISTWIDTH] IN BLOOD BY AUTOMATED COUNT: 40.2 FL (ref 35.9–50)
GFR SERPL CREATININE-BSD FRML MDRD: >60 ML/MIN/1.73 M 2
GLOBULIN SER CALC-MCNC: 3 G/DL (ref 1.9–3.5)
GLUCOSE SERPL-MCNC: 73 MG/DL (ref 65–99)
HCT VFR BLD AUTO: 41.4 % (ref 37–47)
HDLC SERPL-MCNC: 40 MG/DL
HGB BLD-MCNC: 15.1 G/DL (ref 12–16)
IMM GRANULOCYTES # BLD AUTO: 0.03 K/UL (ref 0–0.11)
IMM GRANULOCYTES NFR BLD AUTO: 0.4 % (ref 0–0.9)
LDLC SERPL CALC-MCNC: 98 MG/DL
LYMPHOCYTES # BLD AUTO: 2.86 K/UL (ref 1–4.8)
LYMPHOCYTES NFR BLD: 37 % (ref 22–41)
MCH RBC QN AUTO: 30.5 PG (ref 27–33)
MCHC RBC AUTO-ENTMCNC: 36.5 G/DL (ref 33.6–35)
MCV RBC AUTO: 83.6 FL (ref 81.4–97.8)
MONOCYTES # BLD AUTO: 0.75 K/UL (ref 0–0.85)
MONOCYTES NFR BLD AUTO: 9.7 % (ref 0–13.4)
NEUTROPHILS # BLD AUTO: 3.8 K/UL (ref 2–7.15)
NEUTROPHILS NFR BLD: 49.3 % (ref 44–72)
NRBC # BLD AUTO: 0 K/UL
NRBC BLD AUTO-RTO: 0 /100 WBC
PLATELET # BLD AUTO: 239 K/UL (ref 164–446)
PMV BLD AUTO: 12.5 FL (ref 9–12.9)
POTASSIUM SERPL-SCNC: 4.7 MMOL/L (ref 3.6–5.5)
PROT SERPL-MCNC: 7.1 G/DL (ref 6–8.2)
RBC # BLD AUTO: 4.95 M/UL (ref 4.2–5.4)
SODIUM SERPL-SCNC: 137 MMOL/L (ref 135–145)
TRIGL SERPL-MCNC: 53 MG/DL (ref 0–149)
TSH SERPL DL<=0.005 MIU/L-ACNC: 2.19 UIU/ML (ref 0.3–3.7)
WBC # BLD AUTO: 7.7 K/UL (ref 4.8–10.8)

## 2017-07-01 PROCEDURE — 36415 COLL VENOUS BLD VENIPUNCTURE: CPT

## 2017-07-01 PROCEDURE — 82306 VITAMIN D 25 HYDROXY: CPT

## 2017-07-01 PROCEDURE — 80053 COMPREHEN METABOLIC PANEL: CPT

## 2017-07-01 PROCEDURE — 84443 ASSAY THYROID STIM HORMONE: CPT

## 2017-07-01 PROCEDURE — 85025 COMPLETE CBC W/AUTO DIFF WBC: CPT

## 2017-07-01 PROCEDURE — 80061 LIPID PANEL: CPT

## 2017-07-03 ENCOUNTER — TELEPHONE (OUTPATIENT)
Dept: MEDICAL GROUP | Facility: PHYSICIAN GROUP | Age: 37
End: 2017-07-03

## 2017-10-28 ENCOUNTER — OFFICE VISIT (OUTPATIENT)
Dept: URGENT CARE | Facility: PHYSICIAN GROUP | Age: 37
End: 2017-10-28
Payer: COMMERCIAL

## 2017-10-28 VITALS
TEMPERATURE: 97.6 F | SYSTOLIC BLOOD PRESSURE: 112 MMHG | BODY MASS INDEX: 32.44 KG/M2 | OXYGEN SATURATION: 96 % | HEIGHT: 64 IN | DIASTOLIC BLOOD PRESSURE: 72 MMHG | WEIGHT: 190 LBS | HEART RATE: 75 BPM

## 2017-10-28 DIAGNOSIS — J01.01 ACUTE RECURRENT MAXILLARY SINUSITIS: ICD-10-CM

## 2017-10-28 PROCEDURE — 99214 OFFICE O/P EST MOD 30 MIN: CPT | Performed by: FAMILY MEDICINE

## 2017-10-28 RX ORDER — SULFAMETHOXAZOLE AND TRIMETHOPRIM 800; 160 MG/1; MG/1
1 TABLET ORAL EVERY 12 HOURS
Qty: 20 TAB | Refills: 0 | Status: SHIPPED | OUTPATIENT
Start: 2017-10-28 | End: 2017-11-07

## 2017-10-28 ASSESSMENT — ENCOUNTER SYMPTOMS
MYALGIAS: 1
EYE REDNESS: 0
EYE DISCHARGE: 0
WEIGHT LOSS: 0

## 2017-10-28 NOTE — PROGRESS NOTES
"Subjective:      Heidi Plata is a 37 y.o. female who presents with Sinus Problem (Runny nose, sinus pressure, rt ear pain, sore throat x 3 days )            3 days ST, severe maxillary sinus pressure, bloody drainage. Subjective fever/chills. +earache. No cough. +PMH sinusitis/no sinus surgery. OTC advil with some improvement. No other aggravating or alleviating factors.          Review of Systems   Constitutional: Negative for malaise/fatigue and weight loss.   Eyes: Negative for discharge and redness.   Musculoskeletal: Positive for myalgias. Negative for joint pain.   Skin: Negative for itching and rash.     .  Medications, Allergies, and current problem list reviewed today in Epic       Objective:     /72   Pulse 75   Temp 36.4 °C (97.6 °F)   Ht 1.626 m (5' 4\")   Wt 86.2 kg (190 lb)   LMP 05/19/2017   SpO2 96%   BMI 32.61 kg/m²      Physical Exam   Constitutional: She appears well-developed and well-nourished. No distress.   HENT:   Head: Normocephalic and atraumatic.   Tender maxillary sinus bilateral, +PND    TM's bulging bilateral, left TM mildly red   Eyes: Conjunctivae are normal.   Neck: Neck supple.   Cardiovascular: Normal rate, regular rhythm and normal heart sounds.    Pulmonary/Chest: Effort normal and breath sounds normal. She has no wheezes.   Musculoskeletal:   Speech is clear. Patient is appropriate and cooperative.     Lymphadenopathy:     She has no cervical adenopathy.   Neurological:   Speech is clear. Patient is appropriate and cooperative.     Skin: Skin is warm and dry. No rash noted.               Assessment/Plan:     1. Acute recurrent maxillary sinusitis  sulfamethoxazole-trimethoprim (BACTRIM DS) 800-160 MG tablet     Differential diagnosis, natural history, supportive care, and indications for immediate follow-up discussed at length.   Contingent antibiotic prescription given to patient to fill upon meeting criteria of guidelines discussed.   Nasal saline, coricidin, " nasal CS.

## 2018-01-18 ENCOUNTER — OFFICE VISIT (OUTPATIENT)
Dept: URGENT CARE | Facility: PHYSICIAN GROUP | Age: 38
End: 2018-01-18
Payer: COMMERCIAL

## 2018-01-18 VITALS
TEMPERATURE: 101.5 F | DIASTOLIC BLOOD PRESSURE: 72 MMHG | WEIGHT: 187 LBS | BODY MASS INDEX: 31.92 KG/M2 | RESPIRATION RATE: 16 BRPM | HEART RATE: 113 BPM | SYSTOLIC BLOOD PRESSURE: 106 MMHG | HEIGHT: 64 IN | OXYGEN SATURATION: 98 %

## 2018-01-18 DIAGNOSIS — J11.1 INFLUENZA-LIKE ILLNESS: ICD-10-CM

## 2018-01-18 DIAGNOSIS — J32.9 RHINOSINUSITIS: ICD-10-CM

## 2018-01-18 DIAGNOSIS — Z87.09 HISTORY OF ASTHMA: ICD-10-CM

## 2018-01-18 DIAGNOSIS — R05.9 COUGH: ICD-10-CM

## 2018-01-18 DIAGNOSIS — R50.9 FEVER, UNSPECIFIED FEVER CAUSE: ICD-10-CM

## 2018-01-18 PROCEDURE — 99214 OFFICE O/P EST MOD 30 MIN: CPT | Performed by: FAMILY MEDICINE

## 2018-01-18 RX ORDER — OSELTAMIVIR PHOSPHATE 75 MG/1
75 CAPSULE ORAL 2 TIMES DAILY
Qty: 10 CAP | Refills: 0 | Status: SHIPPED | OUTPATIENT
Start: 2018-01-18 | End: 2018-01-23

## 2018-01-18 RX ORDER — PROMETHAZINE HYDROCHLORIDE AND CODEINE PHOSPHATE 6.25; 1 MG/5ML; MG/5ML
5 SYRUP ORAL 4 TIMES DAILY PRN
Qty: 120 ML | Refills: 0 | Status: SHIPPED | OUTPATIENT
Start: 2018-01-18 | End: 2018-01-25

## 2018-01-18 RX ORDER — ACETAMINOPHEN 500 MG
500 TABLET ORAL ONCE
Status: COMPLETED | OUTPATIENT
Start: 2018-01-18 | End: 2018-01-18

## 2018-01-18 RX ADMIN — Medication 500 MG: at 13:25

## 2018-01-18 ASSESSMENT — ENCOUNTER SYMPTOMS
WEIGHT LOSS: 0
EYE DISCHARGE: 0

## 2018-01-18 NOTE — PROGRESS NOTES
"Subjective:      Heidi Plata is a 37 y.o. female who presents with Other (Lung pain with inhale) and Cough            4 days initial chest tightness/wheeze that improved with albuterol in patient with PMH albuterol. 3 days productive cough without blood in sputum. Fever onset yesterday. HA. Myalgia. +PMH pneumonia. OTC ibuprofen with some relief. No other aggravating or alleviating factors.          Review of Systems   Constitutional: Positive for malaise/fatigue. Negative for weight loss.   HENT: Negative for ear discharge and ear pain.    Eyes: Negative for discharge.   Cardiovascular: Negative for leg swelling.   Skin: Negative for itching and rash.       .  Medications, Allergies, and current problem list reviewed today in Epic     Objective:     /72   Pulse (!) 113   Temp (!) 38.6 °C (101.5 °F)   Resp 16   Ht 1.626 m (5' 4\")   Wt 84.8 kg (187 lb)   LMP 05/19/2017   SpO2 98%   BMI 32.10 kg/m²      Physical Exam   Constitutional: She appears well-developed and well-nourished. No distress.   HENT:   Head: Normocephalic and atraumatic.   Eyes: Conjunctivae are normal.   Neck: Normal range of motion. Neck supple.   Cardiovascular: Regular rhythm.    rapid   Lymphadenopathy:     She has no cervical adenopathy.   Neurological:   Speech is clear. Patient is appropriate and cooperative.     Skin: Skin is warm and dry. No rash noted.               Assessment/Plan:     1. Fever, unspecified fever cause  acetaminophen (TYLENOL) tablet 500 mg    oseltamivir (TAMIFLU) 75 MG Cap   2. Cough  oseltamivir (TAMIFLU) 75 MG Cap    promethazine-codeine (PHENERGAN-CODEINE) 6.25-10 MG/5ML Syrup   3. Influenza-like illness  oseltamivir (TAMIFLU) 75 MG Cap   4. History of asthma       Differential diagnosis, natural history, supportive care, and indications for immediate follow-up discussed at length.     "

## 2018-01-18 NOTE — LETTER
January 18, 2018         Patient: Heidi Plata   YOB: 1980   Date of Visit: 1/18/2018           To Whom it May Concern:    Heidi Plata was seen in my clinic on 1/18/2018. Please excuse 1/18 and 1/19/2018.    Sincerely,           Issac Diaz M.D.  Electronically Signed

## 2018-01-20 ENCOUNTER — TELEPHONE (OUTPATIENT)
Dept: URGENT CARE | Facility: PHYSICIAN GROUP | Age: 38
End: 2018-01-20

## 2018-01-20 NOTE — TELEPHONE ENCOUNTER
The pt phoned stating she is still running a low grade fever, 100.4.  And she sates she was advised to phone back today for an antibiotic if she was still running a fever.     She states still feeling the same.  She has not worsen.  Fevers are coming down.

## 2018-01-21 RX ORDER — DOXYCYCLINE HYCLATE 100 MG
100 TABLET ORAL 2 TIMES DAILY
Qty: 20 TAB | Refills: 0 | Status: SHIPPED | OUTPATIENT
Start: 2018-01-21 | End: 2018-01-31

## 2018-01-21 NOTE — PROGRESS NOTES
Patient called with persistent symptoms including worse sinus pressure.     Will Rx contingent abx if sinuses continue to worsen through this week.

## 2018-02-07 ENCOUNTER — OFFICE VISIT (OUTPATIENT)
Dept: URGENT CARE | Facility: PHYSICIAN GROUP | Age: 38
End: 2018-02-07
Payer: COMMERCIAL

## 2018-02-07 ENCOUNTER — APPOINTMENT (OUTPATIENT)
Dept: RADIOLOGY | Facility: IMAGING CENTER | Age: 38
End: 2018-02-07
Attending: PHYSICIAN ASSISTANT
Payer: COMMERCIAL

## 2018-02-07 VITALS
DIASTOLIC BLOOD PRESSURE: 78 MMHG | WEIGHT: 184 LBS | TEMPERATURE: 99.6 F | BODY MASS INDEX: 31.58 KG/M2 | HEART RATE: 82 BPM | OXYGEN SATURATION: 96 % | SYSTOLIC BLOOD PRESSURE: 112 MMHG | RESPIRATION RATE: 14 BRPM

## 2018-02-07 DIAGNOSIS — R06.02 SOB (SHORTNESS OF BREATH): ICD-10-CM

## 2018-02-07 DIAGNOSIS — J45.21 MILD INTERMITTENT ASTHMA WITH ACUTE EXACERBATION: ICD-10-CM

## 2018-02-07 PROCEDURE — 99214 OFFICE O/P EST MOD 30 MIN: CPT | Performed by: PHYSICIAN ASSISTANT

## 2018-02-07 PROCEDURE — 71046 X-RAY EXAM CHEST 2 VIEWS: CPT | Mod: TC | Performed by: PHYSICIAN ASSISTANT

## 2018-02-07 RX ORDER — METHYLPREDNISOLONE 4 MG/1
TABLET ORAL
Qty: 21 TAB | Refills: 0 | Status: SHIPPED | OUTPATIENT
Start: 2018-02-07 | End: 2018-05-01

## 2018-02-07 RX ORDER — ALBUTEROL SULFATE 90 UG/1
2 AEROSOL, METERED RESPIRATORY (INHALATION) EVERY 6 HOURS PRN
Qty: 8.5 G | Refills: 0 | Status: SHIPPED | OUTPATIENT
Start: 2018-02-07 | End: 2018-09-09

## 2018-02-07 RX ORDER — ALBUTEROL SULFATE 2.5 MG/3ML
2.5 SOLUTION RESPIRATORY (INHALATION) EVERY 4 HOURS PRN
Qty: 30 BULLET | Refills: 1 | Status: SHIPPED | OUTPATIENT
Start: 2018-02-07 | End: 2018-09-09

## 2018-02-07 ASSESSMENT — ENCOUNTER SYMPTOMS
WHEEZING: 0
SHORTNESS OF BREATH: 1
FEVER: 0
CARDIOVASCULAR NEGATIVE: 1
SINUS PAIN: 0
SORE THROAT: 0
CHILLS: 0
GASTROINTESTINAL NEGATIVE: 1
COUGH: 1

## 2018-02-07 NOTE — PROGRESS NOTES
Subjective:      Heidi Plata is a 37 y.o. female who presents with Shortness of Breath (feels like she cant take in a full breath x4 days (L&R) side rib painful to touch x2 months)            Cough   This is a new problem. The current episode started in the past 7 days. The problem has been unchanged. The problem occurs every few minutes. The cough is productive of sputum. Associated symptoms include shortness of breath. Pertinent negatives include no chills, ear pain, fever, sore throat or wheezing. She has tried a beta-agonist inhaler for the symptoms. The treatment provided mild relief. Her past medical history is significant for asthma. There is no history of bronchitis or pneumonia.   Patient had influenza 3 weeks ago. Since then her asthma has not. She isn't feeling short of breath. Using nebulizer and inhaler. She is having some bilateral lateral rib tenderness which she treats to constant coughing.      PMH:  has a past medical history of Angina; ASTHMA; Bronchitis; Cardiac arrhythmia; Chickenpox; Influenza; Kidney, horseshoe; Houston's syndrome; Other acquired deformity of toe; Personal history of venous thrombosis and embolism; Pneumonia; PSVT (paroxysmal supraventricular tachycardia) (CMS-HCC); S/P ablation of ventricular arrhythmia; Ramirez-Parkinson-White syndrome; and WPW (Marisela-Parkinson-White syndrome). She also has no past medical history of Backpain; CATARACT; COPD; Diabetes; or Dialysis.  MEDS:   Current Outpatient Prescriptions:   •  albuterol 108 (90 BASE) MCG/ACT Aero Soln inhalation aerosol, Inhale 2 Puffs by mouth every 6 hours as needed for Shortness of Breath., Disp: 8.5 g, Rfl: 3  ALLERGIES:   Allergies   Allergen Reactions   • Inapsine [Droperidol]    • Keflex    • Morphine    • Penicillins      SURGHX:   Past Surgical History:   Procedure Laterality Date   • REPEAT C SECTION  6/18/2014    Performed by Corinne E Capurro, M.D. at [facility]   • GYN SURGERY  2007    csection   • OTHER   2003    L BREAST LUMPECTOMY   • BLANCA BY LAPAROSCOPY     • CHOLECYSTECTOMY     • IMPLANTABLE CARDIOVERTER DEFIBRILLATOR (ICD)     • OTHER      gallbladder removed   • OTHER      cardiac ablations x 6   • OTHER CARDIAC SURGERY      6 ablation   • PRIMARY C SECTION       SOCHX:  reports that she has never smoked. She has never used smokeless tobacco. She reports that she does not drink alcohol or use drugs.  FH: family history includes Asthma in her mother and son; Diabetes in her father, maternal aunt, paternal aunt, and paternal grandmother; Lung Disease in her mother and son; Other in her maternal grandfather and mother; Psychiatry in her paternal grandmother; Thyroid in her mother and sister.    Review of Systems   Constitutional: Negative for chills and fever.   HENT: Negative for congestion, ear pain, sinus pain and sore throat.    Respiratory: Positive for cough and shortness of breath. Negative for wheezing.    Cardiovascular: Negative.    Gastrointestinal: Negative.        Medications, Allergies, and current problem list reviewed today in Epic     Objective:     /78   Pulse 82   Temp 37.6 °C (99.6 °F)   Resp 14   Wt 83.5 kg (184 lb)   LMP 05/19/2017   SpO2 96%   BMI 31.58 kg/m²      Physical Exam   Constitutional: She is oriented to person, place, and time. She appears well-developed and well-nourished. No distress.   HENT:   Head: Normocephalic and atraumatic.   Right Ear: Tympanic membrane and external ear normal.   Left Ear: Tympanic membrane and external ear normal.   Nose: Nose normal.   Mouth/Throat: Oropharynx is clear and moist. No oropharyngeal exudate.   Eyes: Conjunctivae and EOM are normal. Pupils are equal, round, and reactive to light. Right eye exhibits no discharge. Left eye exhibits no discharge.   Neck: Normal range of motion. Neck supple. No tracheal deviation present. No thyromegaly present.   Cardiovascular: Normal rate, regular rhythm, normal heart sounds and intact distal  pulses.    Pulmonary/Chest: Effort normal. No respiratory distress. She has decreased breath sounds. She has no wheezes. She has no rhonchi. She has no rales.   Musculoskeletal: Normal range of motion.   Lymphadenopathy:     She has no cervical adenopathy.   Neurological: She is alert and oriented to person, place, and time.   Skin: Skin is warm and dry. She is not diaphoretic.   Psychiatric: She has a normal mood and affect. Her behavior is normal. Judgment and thought content normal.   Nursing note and vitals reviewed.         Xray: Negative by my read. Radiology review pending.       Assessment/Plan:     1. SOB (shortness of breath)  DX-CHEST-2 VIEWS    MethylPREDNISolone (MEDROL DOSEPAK) 4 MG Tablet Therapy Pack    albuterol (PROVENTIL) 2.5mg/3ml Nebu Soln solution for nebulization    albuterol 108 (90 Base) MCG/ACT Aero Soln inhalation aerosol   2. Mild intermittent asthma with acute exacerbation  DX-CHEST-2 VIEWS    MethylPREDNISolone (MEDROL DOSEPAK) 4 MG Tablet Therapy Pack    albuterol (PROVENTIL) 2.5mg/3ml Nebu Soln solution for nebulization    albuterol 108 (90 Base) MCG/ACT Aero Soln inhalation aerosol     Asthma exacerbation. PO2 adequate, x-ray negative, pulmonary exam unremarkable  Medrol Dosepak  Refill nebulizer solution and inhaler  OTC meds and conservative measures as discussed  Return to clinic or go to ED if symptoms worsen or persist. Indications for ED discussed at length. Patient voices understanding. Follow-up with your primary care provider in 3-5 days. Red flags discussed.    Please note that this dictation was created using voice recognition software. I have made every reasonable attempt to correct obvious errors, but I expect that there are errors of grammar and possibly content that I did not discover before finalizing the note.

## 2018-03-23 ENCOUNTER — OFFICE VISIT (OUTPATIENT)
Dept: URGENT CARE | Facility: PHYSICIAN GROUP | Age: 38
End: 2018-03-23
Payer: COMMERCIAL

## 2018-03-23 ENCOUNTER — HOSPITAL ENCOUNTER (OUTPATIENT)
Facility: MEDICAL CENTER | Age: 38
End: 2018-03-23
Attending: NURSE PRACTITIONER
Payer: COMMERCIAL

## 2018-03-23 VITALS
HEART RATE: 77 BPM | SYSTOLIC BLOOD PRESSURE: 120 MMHG | TEMPERATURE: 98.4 F | HEIGHT: 64 IN | BODY MASS INDEX: 30.22 KG/M2 | OXYGEN SATURATION: 93 % | WEIGHT: 177 LBS | DIASTOLIC BLOOD PRESSURE: 60 MMHG

## 2018-03-23 DIAGNOSIS — R30.0 DYSURIA: ICD-10-CM

## 2018-03-23 LAB
APPEARANCE UR: NORMAL
BILIRUB UR STRIP-MCNC: NORMAL MG/DL
COLOR UR AUTO: NORMAL
GLUCOSE UR STRIP.AUTO-MCNC: NORMAL MG/DL
KETONES UR STRIP.AUTO-MCNC: NORMAL MG/DL
LEUKOCYTE ESTERASE UR QL STRIP.AUTO: NORMAL
NITRITE UR QL STRIP.AUTO: NORMAL
PH UR STRIP.AUTO: 6 [PH] (ref 5–8)
PROT UR QL STRIP: NORMAL MG/DL
RBC UR QL AUTO: NORMAL
SP GR UR STRIP.AUTO: 1.02
UROBILINOGEN UR STRIP-MCNC: NORMAL MG/DL

## 2018-03-23 PROCEDURE — 99214 OFFICE O/P EST MOD 30 MIN: CPT | Performed by: NURSE PRACTITIONER

## 2018-03-23 PROCEDURE — 81002 URINALYSIS NONAUTO W/O SCOPE: CPT | Performed by: NURSE PRACTITIONER

## 2018-03-23 PROCEDURE — 87086 URINE CULTURE/COLONY COUNT: CPT

## 2018-03-23 RX ORDER — PHENAZOPYRIDINE HYDROCHLORIDE 200 MG/1
200 TABLET, FILM COATED ORAL 3 TIMES DAILY
Qty: 6 TAB | Refills: 0 | Status: SHIPPED | OUTPATIENT
Start: 2018-03-23 | End: 2018-03-25

## 2018-03-23 RX ORDER — FLUCONAZOLE 150 MG/1
150 TABLET ORAL DAILY
Qty: 1 TAB | Refills: 0 | Status: SHIPPED | OUTPATIENT
Start: 2018-03-23 | End: 2018-03-26 | Stop reason: SDUPTHER

## 2018-03-23 RX ORDER — NITROFURANTOIN 25; 75 MG/1; MG/1
100 CAPSULE ORAL EVERY 12 HOURS
Qty: 10 CAP | Refills: 0 | Status: SHIPPED | OUTPATIENT
Start: 2018-03-23 | End: 2018-03-28

## 2018-03-23 ASSESSMENT — ENCOUNTER SYMPTOMS
FLANK PAIN: 0
NAUSEA: 0
CHILLS: 0

## 2018-03-23 NOTE — PROGRESS NOTES
Subjective:      Heidi Plata is a 37 y.o. female who presents with UTI (painful urination, itchy, lower back pain, poss kidney inffection, x2 days )            Dysuria    This is a new problem. Episode onset: Pt reports onset of painful urination and burning 2 days ago. She notes her vaginal area feels itchy and irritated as well. Denies any fever. The problem occurs every urination. The problem has been gradually worsening. The quality of the pain is described as burning and aching. The pain is mild. There has been no fever. She is sexually active. There is no history of pyelonephritis. Associated symptoms include frequency and urgency. Pertinent negatives include no chills, discharge, flank pain or nausea. She has tried increased fluids for the symptoms. The treatment provided no relief. There is no history of recurrent UTIs.       Review of Systems   Constitutional: Negative for chills.   Gastrointestinal: Negative for nausea.   Genitourinary: Positive for dysuria, frequency and urgency. Negative for flank pain.   All other systems reviewed and are negative.    Past Medical History:   Diagnosis Date   • Angina    • ASTHMA    • Bronchitis    • Cardiac arrhythmia    • Chickenpox    • Influenza    • Kidney, horseshoe    • Cruz's syndrome    • Other acquired deformity of toe    • Personal history of venous thrombosis and embolism     states when she was little she had problems clotting   • Pneumonia    • PSVT (paroxysmal supraventricular tachycardia) (CMS-HCC)    • S/P ablation of ventricular arrhythmia     x6 (most recent was 2006)   • Ramirez-Parkinson-White syndrome    • WPW (Marisela-Parkinson-White syndrome)       Past Surgical History:   Procedure Laterality Date   • REPEAT C SECTION  6/18/2014    Performed by Corinne E Capurro, M.D. at [facility]   • GYN SURGERY  2007    csection   • OTHER  2003    L BREAST LUMPECTOMY   • BLANCA BY LAPAROSCOPY     • CHOLECYSTECTOMY     • IMPLANTABLE CARDIOVERTER  "DEFIBRILLATOR (ICD)     • OTHER      gallbladder removed   • OTHER      cardiac ablations x 6   • OTHER CARDIAC SURGERY      6 ablation   • PRIMARY C SECTION        Social History     Social History   • Marital status: Single     Spouse name: N/A   • Number of children: N/A   • Years of education: N/A     Occupational History   • Not on file.     Social History Main Topics   • Smoking status: Never Smoker   • Smokeless tobacco: Never Used   • Alcohol use No   • Drug use: No   • Sexual activity: Yes     Partners: Male     Birth control/ protection: Surgical     Other Topics Concern   • Not on file     Social History Narrative   • No narrative on file          Objective:     /60   Pulse 77   Temp 36.9 °C (98.4 °F)   Ht 1.626 m (5' 4\")   Wt 80.3 kg (177 lb)   LMP 05/19/2017   SpO2 93%   BMI 30.38 kg/m²      Physical Exam   Constitutional: She is oriented to person, place, and time. Vital signs are normal. She appears well-developed and well-nourished.   HENT:   Head: Normocephalic and atraumatic.   Eyes: EOM are normal. Pupils are equal, round, and reactive to light.   Neck: Normal range of motion.   Cardiovascular: Normal rate and regular rhythm.    Pulmonary/Chest: Effort normal.   Abdominal: Soft. Normal appearance. There is tenderness in the suprapubic area. There is no CVA tenderness.   Musculoskeletal: Normal range of motion.   Neurological: She is alert and oriented to person, place, and time.   Skin: Skin is warm and dry. Capillary refill takes less than 2 seconds.   Psychiatric: She has a normal mood and affect. Her speech is normal and behavior is normal. Thought content normal.   Vitals reviewed.            Lab Results   Component Value Date/Time    POCCOLOR Dark Yellow 03/23/2018 12:00 PM    POCAPPEAR Hazy 03/23/2018 12:00 PM    POCLEUKEST Neg 03/23/2018 12:00 PM    POCNITRITE Neg 03/23/2018 12:00 PM    POCUROBILIGE Neg 03/23/2018 12:00 PM    POCPROTEIN Neg 03/23/2018 12:00 PM    POCURPH 6.0 " 03/23/2018 12:00 PM    POCBLOOD Neg 03/23/2018 12:00 PM    POCSPGRV 1.025 03/23/2018 12:00 PM    POCKETONES Neg 03/23/2018 12:00 PM    POCBILIRUBIN Neg 03/23/2018 12:00 PM    POCGLUCUA Neg 03/23/2018 12:00 PM        Assessment/Plan:     1. Dysuria  - POCT Urinalysis  - Urine Culture; Future  - nitrofurantoin monohydr macro (MACROBID) 100 MG Cap; Take 1 Cap by mouth every 12 hours for 5 days.  Dispense: 10 Cap; Refill: 0  - phenazopyridine (PYRIDIUM) 200 MG Tab; Take 1 Tab by mouth 3 times a day for 2 days.  Dispense: 6 Tab; Refill: 0  - fluconazole (DIFLUCAN) 150 MG tablet; Take 1 Tab by mouth every day.  Dispense: 1 Tab; Refill: 0    DDX for dysuria discussed include but are not limited to urethritis, BV, yeast infection, STI  Discussed with patient if her symptoms do not improve with ABX, diflucan and urine culture is negative, we need to explore other options for dysuria. She understands  Increase water intake  Will call with culture results  Supportive care, differential diagnoses, and indications for immediate follow-up discussed with patient.    Pathogenesis of diagnosis discussed including typical length and natural progression.      Instructed to return to  or nearest emergency department if symptoms fail to improve, for any change in condition, further concerns, or new concerning symptoms.  Patient states understanding of the plan of care and discharge instructions.

## 2018-03-26 DIAGNOSIS — R30.0 DYSURIA: ICD-10-CM

## 2018-03-26 DIAGNOSIS — N89.8 VAGINAL IRRITATION: ICD-10-CM

## 2018-03-26 LAB
BACTERIA UR CULT: ABNORMAL
BACTERIA UR CULT: ABNORMAL
SIGNIFICANT IND 70042: ABNORMAL
SITE SITE: ABNORMAL
SOURCE SOURCE: ABNORMAL

## 2018-03-26 RX ORDER — FLUCONAZOLE 150 MG/1
150 TABLET ORAL DAILY
Qty: 1 TAB | Refills: 0 | Status: SHIPPED | OUTPATIENT
Start: 2018-03-26 | End: 2018-05-01

## 2018-05-01 ENCOUNTER — OFFICE VISIT (OUTPATIENT)
Dept: MEDICAL GROUP | Facility: PHYSICIAN GROUP | Age: 38
End: 2018-05-01
Payer: COMMERCIAL

## 2018-05-01 VITALS
SYSTOLIC BLOOD PRESSURE: 102 MMHG | RESPIRATION RATE: 16 BRPM | HEIGHT: 64 IN | TEMPERATURE: 97.8 F | WEIGHT: 171 LBS | DIASTOLIC BLOOD PRESSURE: 60 MMHG | BODY MASS INDEX: 29.19 KG/M2 | HEART RATE: 87 BPM | OXYGEN SATURATION: 94 %

## 2018-05-01 DIAGNOSIS — R53.83 FATIGUE, UNSPECIFIED TYPE: ICD-10-CM

## 2018-05-01 DIAGNOSIS — I83.90 VARICOSE VEIN OF LEG: ICD-10-CM

## 2018-05-01 DIAGNOSIS — I45.6 WPW (WOLFF-PARKINSON-WHITE SYNDROME): ICD-10-CM

## 2018-05-01 DIAGNOSIS — R29.898 TMJ CLICK: ICD-10-CM

## 2018-05-01 DIAGNOSIS — J45.20 MILD INTERMITTENT ASTHMA WITHOUT COMPLICATION: ICD-10-CM

## 2018-05-01 DIAGNOSIS — Z13.220 SCREENING FOR LIPID DISORDERS: ICD-10-CM

## 2018-05-01 PROCEDURE — 99214 OFFICE O/P EST MOD 30 MIN: CPT | Performed by: NURSE PRACTITIONER

## 2018-05-01 ASSESSMENT — PAIN SCALES - GENERAL: PAINLEVEL: NO PAIN

## 2018-05-01 NOTE — ASSESSMENT & PLAN NOTE
States she started noticing swelling and heaviness in her right leg. States it started about 7 months ago. States that she notices it will start to ache in the afternoon after a long day. States that she will notice aching. There is a large varicosity behind her medial right knee. She does notice some discoloration of her lateral right lower leg, no wounds at this time.

## 2018-05-01 NOTE — ASSESSMENT & PLAN NOTE
"Chronic in nature. States that she is always fatigued. States that she has lost 27 pounds, does have 2 young children which contributes. States she only sleeps 5 hours per night. States her brain doesn't \"turn off\".   "

## 2018-05-01 NOTE — ASSESSMENT & PLAN NOTE
Chronic in nature. States she has severe TMJ pain. States she has clicking and popping of her left TMJ. States she will notice tenderness and swelling at times. States that it is stable at this time. States PT is the only thing that improves pain.

## 2018-05-01 NOTE — ASSESSMENT & PLAN NOTE
Chronic in nature. Stable. Uses her rescue inhaler as needed. States she does not have cough, night-time waking, still notices tightness.

## 2018-05-29 ENCOUNTER — APPOINTMENT (OUTPATIENT)
Dept: MEDICAL GROUP | Facility: PHYSICIAN GROUP | Age: 38
End: 2018-05-29
Payer: COMMERCIAL

## 2018-09-07 ENCOUNTER — OFFICE VISIT (OUTPATIENT)
Dept: URGENT CARE | Facility: PHYSICIAN GROUP | Age: 38
End: 2018-09-07
Payer: COMMERCIAL

## 2018-09-07 VITALS
OXYGEN SATURATION: 100 % | TEMPERATURE: 98.2 F | SYSTOLIC BLOOD PRESSURE: 118 MMHG | RESPIRATION RATE: 12 BRPM | DIASTOLIC BLOOD PRESSURE: 66 MMHG | HEIGHT: 64 IN | HEART RATE: 80 BPM | WEIGHT: 172 LBS | BODY MASS INDEX: 29.37 KG/M2

## 2018-09-07 DIAGNOSIS — N30.00 ACUTE CYSTITIS WITHOUT HEMATURIA: Primary | ICD-10-CM

## 2018-09-07 LAB
APPEARANCE UR: CLEAR
BILIRUB UR STRIP-MCNC: NEGATIVE MG/DL
COLOR UR AUTO: YELLOW
GLUCOSE UR STRIP.AUTO-MCNC: NORMAL MG/DL
INT CON NEG: NEGATIVE
INT CON POS: POSITIVE
KETONES UR STRIP.AUTO-MCNC: NEGATIVE MG/DL
LEUKOCYTE ESTERASE UR QL STRIP.AUTO: NEGATIVE
NITRITE UR QL STRIP.AUTO: POSITIVE
PH UR STRIP.AUTO: 5 [PH] (ref 5–8)
POC URINE PREGNANCY TEST: NEGATIVE
PROT UR QL STRIP: NEGATIVE MG/DL
RBC UR QL AUTO: NEGATIVE
SP GR UR STRIP.AUTO: 1.01
UROBILINOGEN UR STRIP-MCNC: 1 MG/DL

## 2018-09-07 PROCEDURE — 99214 OFFICE O/P EST MOD 30 MIN: CPT | Performed by: PHYSICIAN ASSISTANT

## 2018-09-07 PROCEDURE — 81025 URINE PREGNANCY TEST: CPT | Performed by: PHYSICIAN ASSISTANT

## 2018-09-07 PROCEDURE — 81002 URINALYSIS NONAUTO W/O SCOPE: CPT | Performed by: PHYSICIAN ASSISTANT

## 2018-09-07 RX ORDER — PHENAZOPYRIDINE HYDROCHLORIDE 200 MG/1
200 TABLET, FILM COATED ORAL 3 TIMES DAILY
Qty: 6 TAB | Refills: 0 | Status: SHIPPED | OUTPATIENT
Start: 2018-09-07 | End: 2018-09-09

## 2018-09-07 RX ORDER — NITROFURANTOIN 25; 75 MG/1; MG/1
100 CAPSULE ORAL EVERY 12 HOURS
Qty: 10 CAP | Refills: 0 | Status: SHIPPED | OUTPATIENT
Start: 2018-09-07 | End: 2018-09-12

## 2018-09-07 ASSESSMENT — ENCOUNTER SYMPTOMS
CHILLS: 0
NAUSEA: 0
DIARRHEA: 0
ABDOMINAL PAIN: 0
COUGH: 0
FEVER: 0
FLANK PAIN: 0
VOMITING: 0
CONSTIPATION: 0

## 2018-09-07 NOTE — PROGRESS NOTES
Subjective:   Heidi Plata is a 38 y.o. female who presents for UTI (urgency, painful urination, pressure, onset last night at 6pm)    Dysuria    This is a new problem. Episode onset: 3 day. The problem occurs every urination. The problem has been gradually worsening. There has been no fever. She is sexually active. There is a history of pyelonephritis. Associated symptoms include frequency, hesitancy and urgency. Pertinent negatives include no chills, discharge, flank pain, hematuria, nausea, possible pregnancy or vomiting. There is no history of catheterization, kidney stones, recurrent UTIs, a single kidney or a urological procedure.     Denies recent abx usage.     Review of Systems   Constitutional: Negative for chills, fever and malaise/fatigue.   Respiratory: Negative for cough.    Gastrointestinal: Negative for abdominal pain, constipation, diarrhea, nausea and vomiting.   Genitourinary: Positive for dysuria, frequency, hesitancy and urgency. Negative for flank pain and hematuria.   All other systems reviewed and are negative.      PMH:  has a past medical history of Angina; ASTHMA; Bronchitis; Cardiac arrhythmia; Chickenpox; Influenza; Kidney, horseshoe; Salisbury's syndrome; Other acquired deformity of toe; Personal history of venous thrombosis and embolism; Pneumonia; PSVT (paroxysmal supraventricular tachycardia) (HCC); S/P ablation of ventricular arrhythmia; Ramirez-Parkinson-White syndrome; and WPW (Marisela-Parkinson-White syndrome). She also has no past medical history of Backpain; CATARACT; COPD; Diabetes; or Dialysis.  MEDS:   Current Outpatient Prescriptions:   •  nitrofurantoin monohydr macro (MACROBID) 100 MG Cap, Take 1 Cap by mouth every 12 hours for 5 days., Disp: 10 Cap, Rfl: 0  •  phenazopyridine (PYRIDIUM) 200 MG Tab, Take 1 Tab by mouth 3 times a day for 2 days., Disp: 6 Tab, Rfl: 0  •  albuterol (PROVENTIL) 2.5mg/3ml Nebu Soln solution for nebulization, 3 mL by Nebulization route every four hours  "as needed for Shortness of Breath., Disp: 30 Bullet, Rfl: 1  •  albuterol 108 (90 Base) MCG/ACT Aero Soln inhalation aerosol, Inhale 2 Puffs by mouth every 6 hours as needed., Disp: 8.5 g, Rfl: 0  ALLERGIES:   Allergies   Allergen Reactions   • Inapsine [Droperidol]    • Keflex    • Morphine    • Penicillins      SURGHX:   Past Surgical History:   Procedure Laterality Date   • REPEAT C SECTION  6/18/2014    Performed by Corinne E Capurro, M.D. at [facility]   • GYN SURGERY  2007    csection   • OTHER  2003    L BREAST LUMPECTOMY   • BLANCA BY LAPAROSCOPY     • CHOLECYSTECTOMY     • IMPLANTABLE CARDIOVERTER DEFIBRILLATOR (ICD)     • OTHER      gallbladder removed   • OTHER      cardiac ablations x 6   • OTHER CARDIAC SURGERY      6 ablation   • PRIMARY C SECTION       SOCHX:  reports that she has never smoked. She has never used smokeless tobacco. She reports that she does not drink alcohol or use drugs.  Family History   Problem Relation Age of Onset   • Asthma Mother    • Lung Disease Mother         PE   • Other Mother         clotting disease   • Thyroid Mother    • Lung Disease Son         sees pediatric pulmonologist   • Asthma Son    • Diabetes Father    • Thyroid Sister    • Diabetes Maternal Aunt    • Other Maternal Grandfather         CHF   • Diabetes Paternal Aunt    • Diabetes Paternal Grandmother    • Psychiatry Paternal Grandmother         alzheimers        Objective:   /66   Pulse 80   Temp 36.8 °C (98.2 °F)   Resp 12   Ht 1.626 m (5' 4\")   Wt 78 kg (172 lb)   LMP 04/24/2018   SpO2 100%   BMI 29.52 kg/m²     Physical Exam   Constitutional: She is oriented to person, place, and time. She appears well-developed and well-nourished. No distress.   HENT:   Head: Normocephalic and atraumatic.   Eyes: Pupils are equal, round, and reactive to light. Conjunctivae are normal.   Cardiovascular: Normal rate and regular rhythm.    Pulmonary/Chest: Effort normal and breath sounds normal. "   Abdominal: There is tenderness in the suprapubic area. There is no CVA tenderness.   Neurological: She is alert and oriented to person, place, and time.   Skin: Skin is warm and dry.   Psychiatric: She has a normal mood and affect. Her behavior is normal.   Vitals reviewed.    Lab Results   Component Value Date/Time    POCCOLOR yellow 09/07/2018 12:38 PM    POCAPPEAR clear 09/07/2018 12:38 PM    POCLEUKEST negative 09/07/2018 12:38 PM    POCNITRITE positive 09/07/2018 12:38 PM    POCUROBILIGE 1.0 09/07/2018 12:38 PM    POCPROTEIN negative 09/07/2018 12:38 PM    POCURPH 5.0 09/07/2018 12:38 PM    POCBLOOD negative 09/07/2018 12:38 PM    POCSPGRV 1.015 09/07/2018 12:38 PM    POCKETONES negative 09/07/2018 12:38 PM    POCBILIRUBIN negative 09/07/2018 12:38 PM    POCGLUCUA 100mg 09/07/2018 12:38 PM        HCG: neg     Assessment/Plan:     1. Acute cystitis without hematuria  POCT Pregnancy    nitrofurantoin monohydr macro (MACROBID) 100 MG Cap    phenazopyridine (PYRIDIUM) 200 MG Tab    POCT Urinalysis         Patient directed to take full course of abx regardless of sx resolution. If sx worsen or persist patient directed to return to clinic for reevaluation. Supportive care measures reviewed, continue pushing fluids. UTI educational handout given to patient.    Follow-up with primary care provider within 7-10 days, red flags and emergency room precautions discussed.  Patient appears understanding of information.

## 2018-09-09 ENCOUNTER — APPOINTMENT (OUTPATIENT)
Dept: RADIOLOGY | Facility: MEDICAL CENTER | Age: 38
End: 2018-09-09
Attending: EMERGENCY MEDICINE
Payer: COMMERCIAL

## 2018-09-09 ENCOUNTER — OFFICE VISIT (OUTPATIENT)
Dept: URGENT CARE | Facility: PHYSICIAN GROUP | Age: 38
End: 2018-09-09
Payer: COMMERCIAL

## 2018-09-09 ENCOUNTER — HOSPITAL ENCOUNTER (OUTPATIENT)
Facility: MEDICAL CENTER | Age: 38
End: 2018-09-09
Attending: NURSE PRACTITIONER
Payer: COMMERCIAL

## 2018-09-09 ENCOUNTER — HOSPITAL ENCOUNTER (EMERGENCY)
Facility: MEDICAL CENTER | Age: 38
End: 2018-09-09
Attending: EMERGENCY MEDICINE | Admitting: OBSTETRICS & GYNECOLOGY
Payer: COMMERCIAL

## 2018-09-09 VITALS
TEMPERATURE: 99.1 F | HEIGHT: 64 IN | DIASTOLIC BLOOD PRESSURE: 56 MMHG | OXYGEN SATURATION: 96 % | BODY MASS INDEX: 28.83 KG/M2 | RESPIRATION RATE: 20 BRPM | HEART RATE: 99 BPM | WEIGHT: 168.87 LBS | SYSTOLIC BLOOD PRESSURE: 107 MMHG

## 2018-09-09 VITALS
TEMPERATURE: 98.2 F | SYSTOLIC BLOOD PRESSURE: 134 MMHG | HEIGHT: 64 IN | WEIGHT: 172 LBS | OXYGEN SATURATION: 100 % | BODY MASS INDEX: 29.37 KG/M2 | RESPIRATION RATE: 16 BRPM | DIASTOLIC BLOOD PRESSURE: 66 MMHG | HEART RATE: 97 BPM

## 2018-09-09 DIAGNOSIS — N39.0 URINARY TRACT INFECTION WITH HEMATURIA, SITE UNSPECIFIED: ICD-10-CM

## 2018-09-09 DIAGNOSIS — N83.512 TORSION OF LEFT OVARY AND OVARIAN PEDICLE: ICD-10-CM

## 2018-09-09 DIAGNOSIS — R31.9 URINARY TRACT INFECTION WITH HEMATURIA, SITE UNSPECIFIED: ICD-10-CM

## 2018-09-09 DIAGNOSIS — N30.00 ACUTE CYSTITIS WITHOUT HEMATURIA: ICD-10-CM

## 2018-09-09 DIAGNOSIS — N83.519 OVARIAN TORSION: ICD-10-CM

## 2018-09-09 LAB
ALBUMIN SERPL BCP-MCNC: 4.4 G/DL (ref 3.2–4.9)
ALBUMIN/GLOB SERPL: 1.5 G/DL
ALP SERPL-CCNC: 50 U/L (ref 30–99)
ALT SERPL-CCNC: 12 U/L (ref 2–50)
ANION GAP SERPL CALC-SCNC: 10 MMOL/L (ref 0–11.9)
APPEARANCE UR: CLEAR
APPEARANCE UR: NORMAL
AST SERPL-CCNC: 10 U/L (ref 12–45)
BACTERIA #/AREA URNS HPF: ABNORMAL /HPF
BASOPHILS # BLD AUTO: 0.3 % (ref 0–1.8)
BASOPHILS # BLD: 0.04 K/UL (ref 0–0.12)
BILIRUB SERPL-MCNC: 2 MG/DL (ref 0.1–1.5)
BILIRUB UR QL STRIP.AUTO: ABNORMAL
BILIRUB UR STRIP-MCNC: NORMAL MG/DL
BUN SERPL-MCNC: 11 MG/DL (ref 8–22)
CALCIUM SERPL-MCNC: 9.7 MG/DL (ref 8.5–10.5)
CHLORIDE SERPL-SCNC: 104 MMOL/L (ref 96–112)
CO2 SERPL-SCNC: 21 MMOL/L (ref 20–33)
COLOR UR AUTO: NORMAL
COLOR UR: ABNORMAL
CREAT SERPL-MCNC: 0.46 MG/DL (ref 0.5–1.4)
EOSINOPHIL # BLD AUTO: 0.02 K/UL (ref 0–0.51)
EOSINOPHIL NFR BLD: 0.1 % (ref 0–6.9)
EPI CELLS #/AREA URNS HPF: ABNORMAL /HPF
ERYTHROCYTE [DISTWIDTH] IN BLOOD BY AUTOMATED COUNT: 39.1 FL (ref 35.9–50)
GLOBULIN SER CALC-MCNC: 2.9 G/DL (ref 1.9–3.5)
GLUCOSE SERPL-MCNC: 92 MG/DL (ref 65–99)
GLUCOSE UR STRIP.AUTO-MCNC: NEGATIVE MG/DL
GLUCOSE UR STRIP.AUTO-MCNC: NEGATIVE MG/DL
HCG SERPL QL: NEGATIVE
HCT VFR BLD AUTO: 39.9 % (ref 37–47)
HGB BLD-MCNC: 14.8 G/DL (ref 12–16)
IMM GRANULOCYTES # BLD AUTO: 0.07 K/UL (ref 0–0.11)
IMM GRANULOCYTES NFR BLD AUTO: 0.4 % (ref 0–0.9)
KETONES UR STRIP.AUTO-MCNC: ABNORMAL MG/DL
KETONES UR STRIP.AUTO-MCNC: NEGATIVE MG/DL
LACTATE BLD-SCNC: 1.1 MMOL/L (ref 0.5–2)
LEUKOCYTE ESTERASE UR QL STRIP.AUTO: ABNORMAL
LEUKOCYTE ESTERASE UR QL STRIP.AUTO: NEGATIVE
LIPASE SERPL-CCNC: 5 U/L (ref 11–82)
LYMPHOCYTES # BLD AUTO: 1.91 K/UL (ref 1–4.8)
LYMPHOCYTES NFR BLD: 12 % (ref 22–41)
MCH RBC QN AUTO: 31.2 PG (ref 27–33)
MCHC RBC AUTO-ENTMCNC: 37.1 G/DL (ref 33.6–35)
MCV RBC AUTO: 84.2 FL (ref 81.4–97.8)
MICRO URNS: ABNORMAL
MONOCYTES # BLD AUTO: 1.33 K/UL (ref 0–0.85)
MONOCYTES NFR BLD AUTO: 8.3 % (ref 0–13.4)
NEUTROPHILS # BLD AUTO: 12.59 K/UL (ref 2–7.15)
NEUTROPHILS NFR BLD: 78.9 % (ref 44–72)
NITRITE UR QL STRIP.AUTO: POSITIVE
NITRITE UR QL STRIP.AUTO: POSITIVE
NRBC # BLD AUTO: 0 K/UL
NRBC BLD-RTO: 0 /100 WBC
PH UR STRIP.AUTO: 5 [PH]
PH UR STRIP.AUTO: 5 [PH] (ref 5–8)
PLATELET # BLD AUTO: 232 K/UL (ref 164–446)
PMV BLD AUTO: 11.2 FL (ref 9–12.9)
POTASSIUM SERPL-SCNC: 3.9 MMOL/L (ref 3.6–5.5)
PROT SERPL-MCNC: 7.3 G/DL (ref 6–8.2)
PROT UR QL STRIP: NEGATIVE MG/DL
PROT UR QL STRIP: NEGATIVE MG/DL
RBC # BLD AUTO: 4.74 M/UL (ref 4.2–5.4)
RBC # URNS HPF: ABNORMAL /HPF
RBC UR QL AUTO: NEGATIVE
RBC UR QL AUTO: NEGATIVE
SODIUM SERPL-SCNC: 135 MMOL/L (ref 135–145)
SP GR UR STRIP.AUTO: 1.02
SP GR UR STRIP.AUTO: 1.02
UROBILINOGEN UR STRIP-MCNC: 0.2 MG/DL
UROBILINOGEN UR STRIP.AUTO-MCNC: 1 MG/DL
WBC # BLD AUTO: 16 K/UL (ref 4.8–10.8)
WBC #/AREA URNS HPF: ABNORMAL /HPF

## 2018-09-09 PROCEDURE — 96376 TX/PRO/DX INJ SAME DRUG ADON: CPT

## 2018-09-09 PROCEDURE — 81002 URINALYSIS NONAUTO W/O SCOPE: CPT | Performed by: NURSE PRACTITIONER

## 2018-09-09 PROCEDURE — A9270 NON-COVERED ITEM OR SERVICE: HCPCS

## 2018-09-09 PROCEDURE — 99214 OFFICE O/P EST MOD 30 MIN: CPT | Performed by: NURSE PRACTITIONER

## 2018-09-09 PROCEDURE — 83605 ASSAY OF LACTIC ACID: CPT

## 2018-09-09 PROCEDURE — 160035 HCHG PACU - 1ST 60 MINS PHASE I: Performed by: OBSTETRICS & GYNECOLOGY

## 2018-09-09 PROCEDURE — 160009 HCHG ANES TIME/MIN: Performed by: OBSTETRICS & GYNECOLOGY

## 2018-09-09 PROCEDURE — 160048 HCHG OR STATISTICAL LEVEL 1-5: Performed by: OBSTETRICS & GYNECOLOGY

## 2018-09-09 PROCEDURE — 93010 ELECTROCARDIOGRAM REPORT: CPT | Performed by: INTERNAL MEDICINE

## 2018-09-09 PROCEDURE — 700102 HCHG RX REV CODE 250 W/ 637 OVERRIDE(OP)

## 2018-09-09 PROCEDURE — 76830 TRANSVAGINAL US NON-OB: CPT

## 2018-09-09 PROCEDURE — 160036 HCHG PACU - EA ADDL 30 MINS PHASE I: Performed by: OBSTETRICS & GYNECOLOGY

## 2018-09-09 PROCEDURE — 700101 HCHG RX REV CODE 250

## 2018-09-09 PROCEDURE — 160041 HCHG SURGERY MINUTES - EA ADDL 1 MIN LEVEL 4: Performed by: OBSTETRICS & GYNECOLOGY

## 2018-09-09 PROCEDURE — 74176 CT ABD & PELVIS W/O CONTRAST: CPT

## 2018-09-09 PROCEDURE — 96375 TX/PRO/DX INJ NEW DRUG ADDON: CPT

## 2018-09-09 PROCEDURE — 501572 HCHG TROCAR, SHIELD OBTU 5X100: Performed by: OBSTETRICS & GYNECOLOGY

## 2018-09-09 PROCEDURE — 700111 HCHG RX REV CODE 636 W/ 250 OVERRIDE (IP)

## 2018-09-09 PROCEDURE — 83690 ASSAY OF LIPASE: CPT

## 2018-09-09 PROCEDURE — 502704 HCHG DEVICE, LIGASURE IMPACT: Performed by: OBSTETRICS & GYNECOLOGY

## 2018-09-09 PROCEDURE — 36415 COLL VENOUS BLD VENIPUNCTURE: CPT

## 2018-09-09 PROCEDURE — 81001 URINALYSIS AUTO W/SCOPE: CPT

## 2018-09-09 PROCEDURE — 501582 HCHG TROCAR, THRD BLADED: Performed by: OBSTETRICS & GYNECOLOGY

## 2018-09-09 PROCEDURE — 84703 CHORIONIC GONADOTROPIN ASSAY: CPT

## 2018-09-09 PROCEDURE — A4338 INDWELLING CATHETER LATEX: HCPCS | Performed by: OBSTETRICS & GYNECOLOGY

## 2018-09-09 PROCEDURE — 87086 URINE CULTURE/COLONY COUNT: CPT

## 2018-09-09 PROCEDURE — 93005 ELECTROCARDIOGRAM TRACING: CPT | Performed by: ANESTHESIOLOGY

## 2018-09-09 PROCEDURE — 500886 HCHG PACK, LAPAROSCOPY: Performed by: OBSTETRICS & GYNECOLOGY

## 2018-09-09 PROCEDURE — 501584 HCHG TROCAR, THRD CAN&SEAL11X100: Performed by: OBSTETRICS & GYNECOLOGY

## 2018-09-09 PROCEDURE — 700105 HCHG RX REV CODE 258: Performed by: EMERGENCY MEDICINE

## 2018-09-09 PROCEDURE — 85025 COMPLETE CBC W/AUTO DIFF WBC: CPT

## 2018-09-09 PROCEDURE — 96365 THER/PROPH/DIAG IV INF INIT: CPT

## 2018-09-09 PROCEDURE — 501838 HCHG SUTURE GENERAL: Performed by: OBSTETRICS & GYNECOLOGY

## 2018-09-09 PROCEDURE — 160029 HCHG SURGERY MINUTES - 1ST 30 MINS LEVEL 4: Performed by: OBSTETRICS & GYNECOLOGY

## 2018-09-09 PROCEDURE — 80053 COMPREHEN METABOLIC PANEL: CPT

## 2018-09-09 PROCEDURE — 500868 HCHG NEEDLE, SURGI(VARES): Performed by: OBSTETRICS & GYNECOLOGY

## 2018-09-09 PROCEDURE — 99291 CRITICAL CARE FIRST HOUR: CPT

## 2018-09-09 PROCEDURE — 501568 HCHG TROCAR, BLUNTPORT 12MM: Performed by: OBSTETRICS & GYNECOLOGY

## 2018-09-09 PROCEDURE — 160002 HCHG RECOVERY MINUTES (STAT): Performed by: OBSTETRICS & GYNECOLOGY

## 2018-09-09 PROCEDURE — 88305 TISSUE EXAM BY PATHOLOGIST: CPT

## 2018-09-09 PROCEDURE — 700111 HCHG RX REV CODE 636 W/ 250 OVERRIDE (IP): Performed by: EMERGENCY MEDICINE

## 2018-09-09 RX ORDER — BUPIVACAINE HYDROCHLORIDE 2.5 MG/ML
INJECTION, SOLUTION EPIDURAL; INFILTRATION; INTRACAUDAL
Status: DISCONTINUED | OUTPATIENT
Start: 2018-09-09 | End: 2018-09-09 | Stop reason: HOSPADM

## 2018-09-09 RX ORDER — ONDANSETRON 2 MG/ML
4 INJECTION INTRAMUSCULAR; INTRAVENOUS EVERY 6 HOURS PRN
Status: CANCELLED | OUTPATIENT
Start: 2018-09-09

## 2018-09-09 RX ORDER — KETOROLAC TROMETHAMINE 30 MG/ML
30 INJECTION, SOLUTION INTRAMUSCULAR; INTRAVENOUS EVERY 6 HOURS
Status: DISCONTINUED | OUTPATIENT
Start: 2018-09-09 | End: 2018-09-09 | Stop reason: HOSPADM

## 2018-09-09 RX ORDER — IBUPROFEN 600 MG/1
600 TABLET ORAL EVERY 6 HOURS PRN
Qty: 30 TAB | Refills: 1 | Status: SHIPPED | OUTPATIENT
Start: 2018-09-09 | End: 2019-02-22

## 2018-09-09 RX ORDER — SODIUM CHLORIDE, SODIUM LACTATE, POTASSIUM CHLORIDE, CALCIUM CHLORIDE 600; 310; 30; 20 MG/100ML; MG/100ML; MG/100ML; MG/100ML
1000 INJECTION, SOLUTION INTRAVENOUS ONCE
Status: DISCONTINUED | OUTPATIENT
Start: 2018-09-09 | End: 2018-09-09

## 2018-09-09 RX ORDER — HYDROMORPHONE HYDROCHLORIDE 2 MG/ML
1 INJECTION, SOLUTION INTRAMUSCULAR; INTRAVENOUS; SUBCUTANEOUS ONCE
Status: COMPLETED | OUTPATIENT
Start: 2018-09-09 | End: 2018-09-09

## 2018-09-09 RX ORDER — ONDANSETRON 2 MG/ML
4 INJECTION INTRAMUSCULAR; INTRAVENOUS ONCE
Status: COMPLETED | OUTPATIENT
Start: 2018-09-09 | End: 2018-09-09

## 2018-09-09 RX ORDER — OXYCODONE HCL 5 MG/5 ML
SOLUTION, ORAL ORAL
Status: COMPLETED
Start: 2018-09-09 | End: 2018-09-09

## 2018-09-09 RX ORDER — ACETAMINOPHEN 500 MG
500 TABLET ORAL ONCE
Status: ON HOLD | COMMUNITY
End: 2018-09-09

## 2018-09-09 RX ORDER — CIPROFLOXACIN 500 MG/1
500 TABLET, FILM COATED ORAL 2 TIMES DAILY
Qty: 14 TAB | Refills: 0 | Status: SHIPPED | OUTPATIENT
Start: 2018-09-09 | End: 2018-09-09

## 2018-09-09 RX ORDER — OXYCODONE HYDROCHLORIDE 5 MG/1
5 TABLET ORAL
Status: CANCELLED | OUTPATIENT
Start: 2018-09-09

## 2018-09-09 RX ORDER — KETOROLAC TROMETHAMINE 30 MG/ML
30 INJECTION, SOLUTION INTRAMUSCULAR; INTRAVENOUS ONCE
Status: DISCONTINUED | OUTPATIENT
Start: 2018-09-09 | End: 2018-09-09

## 2018-09-09 RX ORDER — IBUPROFEN 200 MG
600 TABLET ORAL ONCE
Status: ON HOLD | COMMUNITY
End: 2018-09-09

## 2018-09-09 RX ORDER — METOCLOPRAMIDE HYDROCHLORIDE 5 MG/ML
10 INJECTION INTRAMUSCULAR; INTRAVENOUS EVERY 4 HOURS PRN
Status: CANCELLED | OUTPATIENT
Start: 2018-09-09

## 2018-09-09 RX ORDER — HYDROMORPHONE HYDROCHLORIDE 2 MG/ML
0.5 INJECTION, SOLUTION INTRAMUSCULAR; INTRAVENOUS; SUBCUTANEOUS
Status: CANCELLED | OUTPATIENT
Start: 2018-09-09

## 2018-09-09 RX ORDER — ACETAMINOPHEN 500 MG
1000 TABLET ORAL EVERY 6 HOURS
Status: CANCELLED | OUTPATIENT
Start: 2018-09-09 | End: 2018-09-14

## 2018-09-09 RX ORDER — OXYCODONE HYDROCHLORIDE AND ACETAMINOPHEN 5; 325 MG/1; MG/1
1-2 TABLET ORAL EVERY 4 HOURS PRN
Qty: 20 TAB | Refills: 0 | Status: SHIPPED | OUTPATIENT
Start: 2018-09-09 | End: 2018-09-16

## 2018-09-09 RX ORDER — OXYCODONE HYDROCHLORIDE 5 MG/1
10 TABLET ORAL
Status: CANCELLED | OUTPATIENT
Start: 2018-09-09

## 2018-09-09 RX ORDER — CIPROFLOXACIN 2 MG/ML
400 INJECTION, SOLUTION INTRAVENOUS ONCE
Status: COMPLETED | OUTPATIENT
Start: 2018-09-09 | End: 2018-09-09

## 2018-09-09 RX ORDER — KETOROLAC TROMETHAMINE 30 MG/ML
INJECTION, SOLUTION INTRAMUSCULAR; INTRAVENOUS
Status: DISCONTINUED
Start: 2018-09-09 | End: 2018-09-09 | Stop reason: HOSPADM

## 2018-09-09 RX ORDER — DIPHENHYDRAMINE HYDROCHLORIDE 50 MG/ML
12.5 INJECTION INTRAMUSCULAR; INTRAVENOUS ONCE
Status: COMPLETED | OUTPATIENT
Start: 2018-09-09 | End: 2018-09-09

## 2018-09-09 RX ORDER — SODIUM CHLORIDE 9 MG/ML
1000 INJECTION, SOLUTION INTRAVENOUS ONCE
Status: COMPLETED | OUTPATIENT
Start: 2018-09-09 | End: 2018-09-09

## 2018-09-09 RX ORDER — SIMETHICONE 80 MG
80 TABLET,CHEWABLE ORAL EVERY 8 HOURS PRN
Status: CANCELLED | OUTPATIENT
Start: 2018-09-09

## 2018-09-09 RX ORDER — MEPERIDINE HYDROCHLORIDE 25 MG/ML
INJECTION INTRAMUSCULAR; INTRAVENOUS; SUBCUTANEOUS
Status: COMPLETED
Start: 2018-09-09 | End: 2018-09-09

## 2018-09-09 RX ADMIN — MEPERIDINE HYDROCHLORIDE 12.5 MG: 25 INJECTION INTRAMUSCULAR; INTRAVENOUS; SUBCUTANEOUS at 19:35

## 2018-09-09 RX ADMIN — CIPROFLOXACIN 400 MG: 2 INJECTION INTRAVENOUS at 16:00

## 2018-09-09 RX ADMIN — DIPHENHYDRAMINE HYDROCHLORIDE 12.5 MG: 50 INJECTION, SOLUTION INTRAMUSCULAR; INTRAVENOUS at 15:50

## 2018-09-09 RX ADMIN — FENTANYL CITRATE 50 MCG: 50 INJECTION, SOLUTION INTRAMUSCULAR; INTRAVENOUS at 19:20

## 2018-09-09 RX ADMIN — HYDROMORPHONE HYDROCHLORIDE 1 MG: 2 INJECTION INTRAMUSCULAR; INTRAVENOUS; SUBCUTANEOUS at 15:00

## 2018-09-09 RX ADMIN — ONDANSETRON 4 MG: 2 INJECTION INTRAMUSCULAR; INTRAVENOUS at 12:26

## 2018-09-09 RX ADMIN — MEPERIDINE HYDROCHLORIDE 12.5 MG: 25 INJECTION INTRAMUSCULAR; INTRAVENOUS; SUBCUTANEOUS at 19:30

## 2018-09-09 RX ADMIN — HYDROMORPHONE HYDROCHLORIDE 1 MG: 2 INJECTION INTRAMUSCULAR; INTRAVENOUS; SUBCUTANEOUS at 12:26

## 2018-09-09 RX ADMIN — ALBUTEROL SULFATE 2.5 MG: 2.5 SOLUTION RESPIRATORY (INHALATION) at 19:45

## 2018-09-09 RX ADMIN — PROCHLORPERAZINE EDISYLATE 10 MG: 5 INJECTION INTRAMUSCULAR; INTRAVENOUS at 15:50

## 2018-09-09 RX ADMIN — ONDANSETRON HYDROCHLORIDE 4 MG: 2 INJECTION, SOLUTION INTRAMUSCULAR; INTRAVENOUS at 15:00

## 2018-09-09 RX ADMIN — OXYCODONE HYDROCHLORIDE 10 MG: 5 SOLUTION ORAL at 19:25

## 2018-09-09 RX ADMIN — KETOROLAC TROMETHAMINE 30 MG: 30 INJECTION, SOLUTION INTRAMUSCULAR; INTRAVENOUS at 20:12

## 2018-09-09 RX ADMIN — SODIUM CHLORIDE 1000 ML: 9 INJECTION, SOLUTION INTRAVENOUS at 12:26

## 2018-09-09 ASSESSMENT — PAIN SCALES - GENERAL
PAINLEVEL_OUTOF10: 0
PAINLEVEL_OUTOF10: 5
PAINLEVEL_OUTOF10: 5
PAINLEVEL_OUTOF10: 0
PAINLEVEL_OUTOF10: 8
PAINLEVEL_OUTOF10: 6
PAINLEVEL_OUTOF10: 8
PAINLEVEL: 8=MODERATE-SEVERE PAIN

## 2018-09-09 ASSESSMENT — ENCOUNTER SYMPTOMS
VOMITING: 0
FLANK PAIN: 1
MYALGIAS: 1
DIARRHEA: 0
CHILLS: 0
ORTHOPNEA: 0
ABDOMINAL PAIN: 1
DIZZINESS: 0
FEVER: 0
NAUSEA: 0
HEADACHES: 1
BACK PAIN: 1

## 2018-09-09 NOTE — ED PROVIDER NOTES
ED Provider Note    Scribed for Suzanna Frost M.D. by Sultana Collier. 9/9/2018, 12:02 PM.    Primary care provider: KALEIGH Bucio  Means of arrival: Walk-In  History obtained from: patient  History limited by: none    CHIEF COMPLAINT  Chief Complaint   Patient presents with   • Abdominal Pain   • UTI     recent UTI seen at u/c on friday and placed on macrobid but not getting better    • Flank Pain     bilateral flank pain        HPI  Heidi Plata is a 38 y.o. female who presents to the Emergency Department complaining of lower abdominal pain, onset 3 days ago with associated urinary urgency. Patient has been seen at urgent care twice since symptom onset being initially diagnosed with a UTI and prescribed Macrobid and Pyridium. She reports no improvement of symptoms since beginning those medications, with worsening lower abdominal pain and newly developed left flank pain, nausea, and mild chills. She denies vomiting, diarrhea  dysuria, or risk of STD, vaginal discharge. Patient states no abdominal pain modifications when she eats.    REVIEW OF SYSTEMS  Pertinent positives include abdominal pain, chills, nausea, flank pain, nausea, urinary urgency. Pertinent negatives include no vomiting, diarrhea, dysuria, vaginal discharge. All other systems reviewed and negative.     PAST MEDICAL HISTORY   has a past medical history of Angina; ASTHMA; Bronchitis; Cardiac arrhythmia; Chickenpox; Influenza; Kidney, horseshoe; Cruz's syndrome; Other acquired deformity of toe; Personal history of venous thrombosis and embolism; Pneumonia; PSVT (paroxysmal supraventricular tachycardia) (HCC); S/P ablation of ventricular arrhythmia; Ramirez-Parkinson-White syndrome; and WPW (Marisela-Parkinson-White syndrome).    SURGICAL HISTORY   has a past surgical history that includes implantable cardioverter defibrillator (icd); qian by laparoscopy; other cardiac surgery; gyn surgery (2007); other (2003); other; repeat c  "section (6/18/2014); primary c section; cholecystectomy; and other.    SOCIAL HISTORY  Social History   Substance Use Topics   • Smoking status: Never Smoker   • Smokeless tobacco: Never Used   • Alcohol use No      History   Drug Use No       FAMILY HISTORY  Family History   Problem Relation Age of Onset   • Asthma Mother    • Lung Disease Mother         PE   • Other Mother         clotting disease   • Thyroid Mother    • Lung Disease Son         sees pediatric pulmonologist   • Asthma Son    • Diabetes Father    • Thyroid Sister    • Diabetes Maternal Aunt    • Other Maternal Grandfather         CHF   • Diabetes Paternal Aunt    • Diabetes Paternal Grandmother    • Psychiatry Paternal Grandmother         alzheimers       CURRENT MEDICATIONS  Home Medications     Reviewed by Cheryl Cullen R.N. (Registered Nurse) on 09/09/18 at 1115  Med List Status: Partial   Medication Last Dose Status   albuterol (PROVENTIL) 2.5mg/3ml Nebu Soln solution for nebulization 9/8/2018 Active   albuterol 108 (90 Base) MCG/ACT Aero Soln inhalation aerosol 9/8/2018 Active   ciprofloxacin (CIPRO) 500 MG Tab have not started Active   nitrofurantoin monohydr macro (MACROBID) 100 MG Cap 9/9/2018 Active   phenazopyridine (PYRIDIUM) 200 MG Tab 9/9/2018 Active                ALLERGIES  Allergies   Allergen Reactions   • Inapsine [Droperidol]    • Keflex    • Morphine    • Penicillins        PHYSICAL EXAM  VITAL SIGNS: /79   Pulse (!) 110   Temp 36.7 °C (98 °F)   Resp 16   Ht 1.626 m (5' 4\")   Wt 76.6 kg (168 lb 14 oz)   LMP 08/15/2018 (Approximate)   SpO2 98%   Breastfeeding? Unknown   BMI 28.99 kg/m²     Constitutional:  Patient laying in moderate painful distress, tearful , able to answer questions  HENT: Nose is normal in appearance without rhinorrhea, external ears are normal,  moist mucous membranes  Eyes: Anicteric,  pupils are equal round and reactive, there is no conjunctival drainage or pallor   Neck: The trachea is " midline, there is no obvious mass or meningeal signs  Cardiovascular: Equal radial pulsation, regular rate and rhythm without murmurs gallops or rubs  Thorax & Lungs: Respiratory rate and effort are normal. There is normal chest excursion with respiration.  No wheezes rhonchi or rales noted.  Abdomen: Abdomen is normal in appearance,  normal bowel sounds, severe bilateral lower quadrant pain with cough with involuntary guarding and rebound, nonpulsatile  :  Left CVA tenderness to palpation  Musculoskeletal: No deformities noted in all 4 extremities. Actively moves all 4 extremities  Skin: Visualized skin is warm, no erythema, no rash.  Neurologic:  Cranial nerves II through XII are intact there is no focal abnormality noted.  Psychiatric: Normal mood and mentation    DIAGNOSTIC STUDIES / PROCEDURES    LABS  Results for orders placed or performed during the hospital encounter of 09/09/18   CBC WITH DIFFERENTIAL   Result Value Ref Range    WBC 16.0 (H) 4.8 - 10.8 K/uL    RBC 4.74 4.20 - 5.40 M/uL    Hemoglobin 14.8 12.0 - 16.0 g/dL    Hematocrit 39.9 37.0 - 47.0 %    MCV 84.2 81.4 - 97.8 fL    MCH 31.2 27.0 - 33.0 pg    MCHC 37.1 (H) 33.6 - 35.0 g/dL    RDW 39.1 35.9 - 50.0 fL    Platelet Count 232 164 - 446 K/uL    MPV 11.2 9.0 - 12.9 fL    Neutrophils-Polys 78.90 (H) 44.00 - 72.00 %    Lymphocytes 12.00 (L) 22.00 - 41.00 %    Monocytes 8.30 0.00 - 13.40 %    Eosinophils 0.10 0.00 - 6.90 %    Basophils 0.30 0.00 - 1.80 %    Immature Granulocytes 0.40 0.00 - 0.90 %    Nucleated RBC 0.00 /100 WBC    Neutrophils (Absolute) 12.59 (H) 2.00 - 7.15 K/uL    Lymphs (Absolute) 1.91 1.00 - 4.80 K/uL    Monos (Absolute) 1.33 (H) 0.00 - 0.85 K/uL    Eos (Absolute) 0.02 0.00 - 0.51 K/uL    Baso (Absolute) 0.04 0.00 - 0.12 K/uL    Immature Granulocytes (abs) 0.07 0.00 - 0.11 K/uL    NRBC (Absolute) 0.00 K/uL   COMP METABOLIC PANEL   Result Value Ref Range    Sodium 135 135 - 145 mmol/L    Potassium 3.9 3.6 - 5.5 mmol/L     Chloride 104 96 - 112 mmol/L    Co2 21 20 - 33 mmol/L    Anion Gap 10.0 0.0 - 11.9    Glucose 92 65 - 99 mg/dL    Bun 11 8 - 22 mg/dL    Creatinine 0.46 (L) 0.50 - 1.40 mg/dL    Calcium 9.7 8.5 - 10.5 mg/dL    AST(SGOT) 10 (L) 12 - 45 U/L    ALT(SGPT) 12 2 - 50 U/L    Alkaline Phosphatase 50 30 - 99 U/L    Total Bilirubin 2.0 (H) 0.1 - 1.5 mg/dL    Albumin 4.4 3.2 - 4.9 g/dL    Total Protein 7.3 6.0 - 8.2 g/dL    Globulin 2.9 1.9 - 3.5 g/dL    A-G Ratio 1.5 g/dL   LIPASE   Result Value Ref Range    Lipase 5 (L) 11 - 82 U/L   URINALYSIS CULTURE, IF INDICATED   Result Value Ref Range    Color DK Yellow     Character Clear     Specific Gravity 1.023 <1.035    Ph 5.0 5.0 - 8.0    Glucose Negative Negative mg/dL    Ketones Trace (A) Negative mg/dL    Protein Negative Negative mg/dL    Bilirubin Small (A) Negative    Urobilinogen, Urine 1.0 Negative    Nitrite Positive (A) Negative    Leukocyte Esterase Trace (A) Negative    Occult Blood Negative Negative    Micro Urine Req Microscopic    HCG QUALITATIVE SERUM   Result Value Ref Range    Beta-Hcg Qualitative Serum Negative Negative   LACTIC ACID   Result Value Ref Range    Lactic Acid 1.1 0.5 - 2.0 mmol/L   URINE MICROSCOPIC (W/UA)   Result Value Ref Range    WBC 0-2 /hpf    RBC 0-2 /hpf    Bacteria Moderate (A) None /hpf    Epithelial Cells Moderate (A) /hpf   ESTIMATED GFR   Result Value Ref Range    GFR If African American >60 >60 mL/min/1.73 m 2    GFR If Non African American >60 >60 mL/min/1.73 m 2       All labs reviewed by me.    RADIOLOGY  CT-RENAL COLIC EVALUATION(A/P W/O)   Final Result         1. No urinary tract calculus identified. No renal collecting system in dilatation.      2. Significantly enlarged heterogeneous left ovary, measuring 6.1 x 6.0 cm with surrounding stranding, concerning for torsion. Further evaluation ultrasound is recommended.      CRITICAL RESULT READ BACK: Preliminary findings discussed with and critical read back performed by Dr. CLARKE  JESUS in the Emergency Department via telephone on 9/9/2018 2:15 PM      US-GYN-PELVIS TRANSVAGINAL    (Results Pending)     The radiologist's interpretation of all radiological studies have been reviewed by me.    COURSE & MEDICAL DECISION MAKING  Nursing notes and vital signs were reviewed. (See chart for details)  The patient's  records were reviewed, history was obtained from the patient;     The patient presents with left lower quadrant pain and the differential diagnosis includes but is not limited to Bladder infection, diverticulitis., appendicitis, ruptured ovarian cyst, ovarian torsion pyelonephritis, infected kidney stone.    12:02 PM Initial orders in the Emergency Department included renal colic CT, estimated GFR, lactic acid, CBC, CMP, lipase, UA, HCG qual, urine microscopic and initial treatment in the Emergency Department included 1mg IV Dilaudid and 4mg IV Zofran and the patient received IV  NS secondary to kidney protection for CT.    2:39 PM On repeat evaluation of the patient, there was a  positive  response to medications with improvement of discomfort. I informed the patient of her work up so far. I explained that she would require further imaging for evaluation of other possible acute injuries. Patient understands and agrees with treatment plan.    Additional work-up planned includes pelvis transvaginal US and additional pain and nausea medication.  This was discussed with patient.  Patient was given IV ciprofloxacin for her urinary tract infection    3:30 PM I was called acutely to the patient's room where pelvic US is being performed. There is strong indication for torsion at this time. Radiologist joins me at bedside to confirm. Patient is still experiencing significant discomfort following pain medications as well, supporting this finding. I explained that she will need to be evaluated by an OBGYN for surgical intervention.    3:34 PM Paged Dr. Frias, Patient's established  [facility]    3:54 PM Spoke with [facility], about the patient's condition. Agrees to take the patient to the OR at next available time. I informed the patient of this. Pain and nausea medications will be ordered to keep the patient comfortable until that time.    HYDRATION: Based on the patient's presentation of Abnormal Fluid Loss and Other kidney protection the patient was given IV fluids.  Upon recheck following hydration, the patient was maintained hydration.     ED testing reveals ovarian torsion    DISPOSITION:  Patient will be admitted to [facility] in guarded condition.    FINAL IMPRESSION  1. Ovarian torsion    2. Acute cystitis without hematuria          ISultana (Scribe), am scribing for, and in the presence of, Suzanna Frost M.D..    Electronically signed by: Sultana Collier (Scribe), 9/9/2018    ISuzanna M.D. personally performed the services described in this documentation, as scribed by Sultana Collier in my presence, and it is both accurate and complete.    The note accurately reflects work and decisions made by me.  Suzanna Frost  9/9/2018  6:28 PM

## 2018-09-09 NOTE — ED TRIAGE NOTES
Pt to triage .  Chief Complaint   Patient presents with   • Abdominal Pain   • UTI     recent UTI seen at u/c on friday and placed on macrobid but not getting better    • Flank Pain     bilateral flank pain

## 2018-09-09 NOTE — ED NOTES
Med rec complete per pt at bedside  Ok per Pt to discuss medications with visitor/s present  Antibiotics taken in the last 30 days: yes  Allergies verified and updated: yes

## 2018-09-09 NOTE — PROGRESS NOTES
Subjective:      Heidi Plata is a 38 y.o. female who presents with Cystitis (onset friday )            HPI New problem. 38 year old female with urinary tract infection since Friday. She was seen at that time and rx;d macrobid. Her symptoms over the past couple of days have worsened with onset of back pain and abdominal pain as well as nausea. She has no fever but is taking ibuprofen. Urine today is + for nitrites. No culture sent on Friday. She denies any vaginal discharge or risk of STI. She denies any vaginal itching with this. Continues to have urgency and frequency.  Inapsine [droperidol]; Keflex; Morphine; and Penicillins  Current Outpatient Prescriptions on File Prior to Visit   Medication Sig Dispense Refill   • nitrofurantoin monohydr macro (MACROBID) 100 MG Cap Take 1 Cap by mouth every 12 hours for 5 days. 10 Cap 0   • phenazopyridine (PYRIDIUM) 200 MG Tab Take 1 Tab by mouth 3 times a day for 2 days. 6 Tab 0   • albuterol (PROVENTIL) 2.5mg/3ml Nebu Soln solution for nebulization 3 mL by Nebulization route every four hours as needed for Shortness of Breath. 30 Bullet 1   • albuterol 108 (90 Base) MCG/ACT Aero Soln inhalation aerosol Inhale 2 Puffs by mouth every 6 hours as needed. 8.5 g 0     No current facility-administered medications on file prior to visit.      Social History     Social History   • Marital status:      Spouse name: N/A   • Number of children: N/A   • Years of education: N/A     Occupational History   • Not on file.     Social History Main Topics   • Smoking status: Never Smoker   • Smokeless tobacco: Never Used   • Alcohol use No   • Drug use: No   • Sexual activity: Yes     Partners: Male     Birth control/ protection: Surgical     Other Topics Concern   • Not on file     Social History Narrative   • No narrative on file     family history includes Asthma in her mother and son; Diabetes in her father, maternal aunt, paternal aunt, and paternal grandmother; Lung Disease in her  "mother and son; Other in her maternal grandfather and mother; Psychiatry in her paternal grandmother; Thyroid in her mother and sister.      Review of Systems   Constitutional: Negative for chills and fever.   Cardiovascular: Negative for chest pain and orthopnea.   Gastrointestinal: Positive for abdominal pain. Negative for diarrhea, nausea and vomiting.   Genitourinary: Positive for dysuria, flank pain, frequency and urgency. Negative for hematuria.   Musculoskeletal: Positive for back pain and myalgias.   Neurological: Positive for headaches. Negative for dizziness.          Objective:     /66   Pulse 97   Temp 36.8 °C (98.2 °F)   Resp 16   Ht 1.626 m (5' 4\")   Wt 78 kg (172 lb)   LMP 04/24/2018   SpO2 100%   BMI 29.52 kg/m²      Physical Exam   Constitutional: She is oriented to person, place, and time. She appears well-developed and well-nourished. No distress.   Cardiovascular: Normal rate, regular rhythm and normal heart sounds.    No murmur heard.  Pulmonary/Chest: Effort normal and breath sounds normal. No respiratory distress.   Abdominal: Soft. Bowel sounds are normal. There is tenderness in the suprapubic area. There is CVA tenderness.   Musculoskeletal: Normal range of motion.   Moves all 4 extremities normally   Neurological: She is alert and oriented to person, place, and time.   Skin: Skin is warm and dry.   Psychiatric: She has a normal mood and affect. Her behavior is normal. Thought content normal.   Nursing note and vitals reviewed.              Assessment/Plan:     1. Urinary tract infection with hematuria, site unspecified  URINE CULTURE(NEW)    POCT Urinalysis    ciprofloxacin (CIPRO) 500 MG Tab     No fever today but could be masked.  Unable to give rocephin as she is allergic to keflex and cannot remember reaction.  cipro and stop macrobid.   STRICT ED precautions given for onset of vomiting or fever. I am worried she may have beginning pyelo.  She has voiced understanding of " POC and follow up instructions.

## 2018-09-10 LAB — EKG IMPRESSION: NORMAL

## 2018-09-10 NOTE — OP REPORT
DATE OF SERVICE:  09/09/2018    PREOPERATIVE DIAGNOSIS:  Suspected left ovarian torsion.    POSTOPERATIVE DIAGNOSES:  Left ovarian torsion with ruptured left ovarian   cyst.    PROCEDURE:  Operative pelviscopy with left salpingo-oophorectomy.    SURGEON:  Dianne Martinez MD    ANESTHESIOLOGIST:  Raúl Fierro MD    ANESTHESIA:  General endotracheal anesthesia.    DESCRIPTION OF PROCEDURE:  Patient was taken back to the operating room where   she underwent general endotracheal anesthesia without difficulty.  She was   positioned in dorsal lithotomy position.  Her left arm was tucked at the side.    She received a total of 5 mL of 0.25% Marcaine plain subcutaneously at the   umbilicus.  A transverse incision was made to her previous scar.  We attempted   placement of the Veress needle; however, low flow could not be obtained.  As   a result, S retractors were placed.  The fascia was elevated, incised and   Deanna was placed.  Abdomen was allowed to insufflate.  Camera was inserted   into the abdominal cavity and the abdominal cavity was inspected.  There is   approximately 100 mL of hematoperitoneum present.  The right tube and ovary   appeared unremarkable.  The uterus appeared unremarkable.  The upper quadrant   of the abdomen is unremarkable.  On the left side, there was a markedly   enlarged left ovary that appeared only partially torsed, both with a large   ruptured ovarian cyst. The tissue was extremely necrotic and fell apart with any   Manipulation.  A second 11 mm trocar was placed under direct visualization suprapubically.    The 5 mm handheld LigaSure was then used to serially cauterize, cut, and   remove the left tube and ovary.  Surgical site appeared hemostatic.  The   abdomen was copiously irrigated and suctioned and placed on low flow.    Surgical site again remained hemostatic.  All trocars were removed under   direct visualization.  At each site, the fascia was grasped, elevated, and    figure-of-eight suture with 0 Vicryl was placed on UR-6 to close the fascial   layer.  Skin was closed in each location with 4-0 Vicryl.  Dressings, 2x2 and   Tegaderm were placed.    Please note at the beginning of the case, sterile speculum was placed.  A   single toothed tenaculum placed on the anterior lip of the cervix with cone   cannula was placed.  At the end of the case, both of these instruments were   removed and tenaculum sites were made to be hemostatic using silver nitrate.    SPECIMENS:  Left tube and ovary.    ESTIMATED BLOOD LOSS:  50 mL.  Hematoperitoneum of 100 mL.    FLUIDS:  1200 mL crystalloid.    URINE OUTPUT:  150 mL clear yellow urine.    MEDICATIONS:  1.  Clindamycin 900 mg.  2.  A 0.25% Marcaine plain with 10 mL.    COUNTS:  Correct.    COMPLICATIONS:  None apparent.    DISPOSITION:  Stable for routine postoperative care.  Patient will be   discharged home when she meets PACU criteria.       ____________________________________     EZEQUIEL ROBERTO, MD FRAZIER / [facility]    DD:  09/09/2018 19:22:11  DT:  09/09/2018 20:39:07    D#:  [id number]  Job#:  [id number]

## 2018-09-10 NOTE — DISCHARGE INSTRUCTIONS
ACTIVITY: Rest and take it easy for the first 24 hours.  A responsible adult is recommended to remain with you during that time.  It is normal to feel sleepy.  We encourage you to not do anything that requires balance, judgment or coordination.    MILD FLU-LIKE SYMPTOMS ARE NORMAL. YOU MAY EXPERIENCE GENERALIZED MUSCLE ACHES, THROAT IRRITATION, HEADACHE AND/OR SOME NAUSEA.    FOR 24 HOURS DO NOT:  Drive, operate machinery or run household appliances.  Drink beer or alcoholic beverages.   Make important decisions or sign legal documents.    SPECIAL INSTRUCTIONS: Leave dressing in place for next 24 hours. Pelvic Rest for 2 weeks.     DIET: To avoid nausea, slowly advance diet as tolerated, avoiding spicy or greasy foods for the first day.  Add more substantial food to your diet according to your physician's instructions.  Babies can be fed formula or breast milk as soon as they are hungry.  INCREASE FLUIDS AND FIBER TO AVOID CONSTIPATION.    SURGICAL DRESSING/BATHING: Shower only / No bath / no hot tub. Shower only     FOLLOW-UP APPOINTMENT:  A follow-up appointment should be arranged with your doctor in 2 weeks; call to schedule.    You should CALL YOUR PHYSICIAN if you develop:  Fever greater than 101 degrees F.  Pain not relieved by medication, or persistent nausea or vomiting.  Excessive bleeding (blood soaking through dressing) or unexpected drainage from the wound.  Extreme redness or swelling around the incision site, drainage of pus or foul smelling drainage.  Inability to urinate or empty your bladder within 8 hours.  Problems with breathing or chest pain.    You should call 911 if you develop problems with breathing or chest pain.  If you are unable to contact your doctor or surgical center, you should go to the nearest emergency room or urgent care center.  Physician's telephone #:    Phone Fax E-mail Address   [phone] Not available. [id number]    [place] [address]          If any questions arise, call your doctor.  If your doctor is not available, please feel free to call the Surgical Center at [phone].  The Center is open Monday through Friday from 7AM to 7PM.  You can also call the HEALTH HOTLINE open 24 hours/day, 7 days/week and speak to a nurse at [phone], or toll free at [phone].    A registered nurse may call you a few days after your surgery to see how you are doing after your procedure.    MEDICATIONS: Resume taking daily medication.  Take prescribed pain medication with food.  If no medication is prescribed, you may take non-aspirin pain medication if needed.  PAIN MEDICATION CAN BE VERY CONSTIPATING.  Take a stool softener or laxative such as senokot, pericolace, or milk of magnesia if needed.    Prescription given for Percocet 5-325 mg and motrin.  Last pain medication given at 1925 (7:25 pm).    If your physician has prescribed pain medication that includes Acetaminophen (Tylenol), do not take additional Acetaminophen (Tylenol) while taking the prescribed medication.    Depression / Suicide Risk    As you are discharged from this [facility] facility, it is important to learn how to keep safe from harming yourself.    Recognize the warning signs:  · Abrupt changes in personality, positive or negative- including increase in energy   · Giving away possessions  · Change in eating patterns- significant weight changes-  positive or negative  · Change in sleeping patterns- unable to sleep or sleeping all the time   · Unwillingness or inability to communicate  · Depression  · Unusual sadness, discouragement and loneliness  · Talk of wanting to die  · Neglect of personal appearance   · Rebelliousness- reckless behavior  · Withdrawal from people/activities they love  · Confusion- inability to concentrate     If you or a loved one observes any of these behaviors or has concerns about self-harm, here's what you can do:  · Talk about it- your feelings  and reasons for harming yourself  · Remove any means that you might use to hurt yourself (examples: pills, rope, extension cords, firearm)  · Get professional help from the community (Mental Health, Substance Abuse, psychological counseling)  · Do not be alone:Call your Safe Contact- someone whom you trust who will be there for you.  · Call your local CRISIS HOTLINE 586-6675 or 847-078-1790  · Call your local Children's Mobile Crisis Response Team [place] (550) 589-8004 or www.[facility]  · Call the toll free National Suicide Prevention Hotlines   · National Suicide Prevention Lifeline 144-133-LSOI (0842)  · National Hope Line Network 800-SUICIDE (686-4259)

## 2018-09-10 NOTE — H&P
HISTORY OF PRESENT ILLNESS:  This is a 38-year-old  2, para 2 female   who presents to the emergency room with worsening lower abdominal pain.  She   was initially seen 3 days ago at urgent care with some urinary urgency and was   diagnosed with a urinary tract infection.  She was treated with Macrobid and   was told she would improve within 24 hours, she had no improvement of her   symptoms and in fact worsening symptoms, she presented today with severe lower   abdominal pain, nausea and vomiting.    PAST MEDICAL HISTORY:  1.  Asthma.  2.  Marisela-Parkinson-White syndrome.  3.  [place] syndrome.  4.  Horseshoe kidney.  5.  Documentation of a personal history of venous thrombosis and embolism,   though the patient denies this.    PAST SURGICAL HISTORY:  1.  Placement and then removal of cardioverter defibrillator.  2.  Laparoscopic cholecystectomy.  3.   section x2.  4. Cardiac ablation x6 for WPW    MEDICATIONS:  None.    ALLERGIES:  1.  KEFLEX AND PENICILLIN CAUSE A RASH.  2.  DROPERIDOL.    OBSTETRICAL HISTORY:  Patient is a  2, para 2 female.    GYNECOLOGIC HISTORY:  No history of sexually transmitted diseases or HSV.  She   currently uses a vasectomy for contraception.    SOCIAL HISTORY:  Patient presents today with her , Iker.  She denies   tobacco, alcohol or drugs.  She works as a teacher.    PHYSICAL EXAMINATION:   VITAL SIGNS:  Temperature 36.7, pulse , respiratory rate is 11-18, blood   pressure 125/79 and O2 saturation % on room air.  GENERAL:  She is well-developed, well-nourished female in no acute distress.  HEENT:  Atraumatic, normocephalic, somewhat micrognathic.  CARDIOVASCULAR:  Regular rate and rhythm.  No murmurs, rubs or gallops.  LUNGS:  Clear to auscultation bilaterally.  ABDOMEN:  Soft, but markedly tender primarily in the left lower quadrant.    Mild rebound present.  No guarding.  PELVIC:  Deferred.  EXTREMITIES:  No clubbing, cyanosis or  edema.    LABORATORY DATA:  CBC, white blood count 16.0, hemoglobin 14, hematocrit 39,   platelets 232.  Chemistry panel unremarkable.  Lactic acid is 1.1.  Urinalysis   is notable for negative glucose, trace ketones, small bilirubin, negative   blood, negative protein, positive nitrites, 0-2 white blood cells, 0-2 red   blood cells, and moderate bacteria.  Urine pregnancy test is negative.    IMAGING:  Transvaginal pelvic ultrasound is notable for an enlarged left ovary   measuring 6.6x4.6x6.1 cm with heterogenous edematous stroma.  No flow was   seen to the left ovary.  There is only minimal peripheral flow seen near the   pedicle.  No free fluid noted.  The remainder of the pelvic ultrasound was   unremarkable.  A CT scan is notable for an enlarged left ovary as stated above   and otherwise is unremarkable, no evidence of urinary calculus, renal polyp   dilation.  Appendix was unremarkable.    ASSESSMENT AND PLAN:  This is a 38-year-old  2, para 2 female with a   left ovarian torsion and urinary tract infection.  1.  Patient does in fact have a urinary tract infection based on her UA;   however, this is not the source of her primary issue at this time.  She has no   evidence of sepsis and we will continue on oral outpatient antibiotic   therapy.  2.  Patient does have evidence of ovarian torsion, which has likely been   present since Friday, we discussed surgical management of this issue and a   formal laparoscopic left salpingo-oophorectomy.  I discussed the possible need   for conversion to laparotomy given the size of the ovary.  She consents to   this risks of this procedure include but are not limited to bleeding, pain,   infection, damage to other organs, nerves or blood vessels, need for more   extensive surgery or reoperation in the event of visceral injury as well as   potential conversion to laparotomy.  She understands that there are general   surgical risks such as DVT, pulmonary emboli,  cardiovascular events, reaction   to medications or anesthesia or in rare cases death.  She also consents to   transfusion if indicated.  She has no future childbearing plans and has no   objection to removal of the left tube and ovary.  3.  Operating room has been aware, we plan to proceed to the operating room   shortly.       ____________________________________     MD KARIN NELSON / ALLI    DD:  09/09/2018 17:10:48  DT:  09/09/2018 17:34:04    D#:  [id number]  Job#:  [id number]

## 2018-09-10 NOTE — OR SURGEON
Immediate Post OP Note    PreOp Diagnosis: Left ovarian torsion    PostOp Diagnosis: same with ruptured L ovarian cyst    Procedure(s):  PELVISCOPY  LEFT SALPINGOOPHORECTOMY    Surgeon(s):  Dianne Martinez M.D.    Anesthesiologist/Type of Anesthesia:  NORM Fierro    Specimens removed if any:  Left tube and ovary     Estimated Blood Loss: 50cc  Fluids: 1200cc  UOP: 150cc    Medications: Clindamycin 900mg    Findings:   Hematoperitoneum of 100cc  Markedly enlarged L ovary with evidence of torsion and L ovarian cyst rupture    Complications: none apparent    [id number]      9/9/2018 5:12 PM Dianne Martinez M.D.

## 2018-09-10 NOTE — PROGRESS NOTES
Patient to be discharged this evening via wheelchair to family vehicle with  Iker. Discharge instructions were provided and gone over. All questions answered. Patient states pain is tolerable, no c/o n/v at this time, is steady on her feet, and voided without difficulty in PACU BR. VSS.

## 2018-09-11 LAB
BACTERIA UR CULT: NORMAL
SIGNIFICANT IND 70042: NORMAL
SITE SITE: NORMAL
SOURCE SOURCE: NORMAL

## 2018-10-31 ENCOUNTER — OFFICE VISIT (OUTPATIENT)
Dept: URGENT CARE | Facility: PHYSICIAN GROUP | Age: 38
End: 2018-10-31
Payer: COMMERCIAL

## 2018-10-31 VITALS
HEART RATE: 72 BPM | DIASTOLIC BLOOD PRESSURE: 72 MMHG | OXYGEN SATURATION: 97 % | BODY MASS INDEX: 29.19 KG/M2 | TEMPERATURE: 97.9 F | WEIGHT: 171 LBS | SYSTOLIC BLOOD PRESSURE: 120 MMHG | HEIGHT: 64 IN

## 2018-10-31 DIAGNOSIS — J06.9 ACUTE URI: ICD-10-CM

## 2018-10-31 DIAGNOSIS — J02.9 SORE THROAT: Primary | ICD-10-CM

## 2018-10-31 LAB
INT CON NEG: NORMAL
INT CON POS: NORMAL
S PYO AG THROAT QL: NEGATIVE

## 2018-10-31 PROCEDURE — 87880 STREP A ASSAY W/OPTIC: CPT | Performed by: PHYSICIAN ASSISTANT

## 2018-10-31 PROCEDURE — 99214 OFFICE O/P EST MOD 30 MIN: CPT | Performed by: PHYSICIAN ASSISTANT

## 2018-10-31 RX ORDER — AZITHROMYCIN 250 MG/1
TABLET, FILM COATED ORAL
Qty: 6 TAB | Refills: 0 | Status: SHIPPED | OUTPATIENT
Start: 2018-10-31 | End: 2019-02-22

## 2018-10-31 ASSESSMENT — ENCOUNTER SYMPTOMS
SINUS PAIN: 1
FEVER: 1
SORE THROAT: 1
COUGH: 0

## 2018-10-31 NOTE — PROGRESS NOTES
Subjective:      Heidi Plata is a 38 y.o. female who presents with Otalgia (Rt ear pain, Rt neck pain, Rt side sinus pressure x 1 wk )    PMH:  has a past medical history of Angina; ASTHMA; Bronchitis; Cardiac arrhythmia; Chickenpox; Influenza; Kidney, horseshoe; Cruz's syndrome; Other acquired deformity of toe; Personal history of venous thrombosis and embolism; Pneumonia; PSVT (paroxysmal supraventricular tachycardia) (HCC); S/P ablation of ventricular arrhythmia; Ramirez-Parkinson-White syndrome; and WPW (Marisela-Parkinson-White syndrome). She also has no past medical history of Backpain; CATARACT; COPD; Diabetes; or Dialysis.  MEDS:   Current Outpatient Prescriptions:   •  ibuprofen (MOTRIN) 600 MG Tab, Take 1 Tab by mouth every 6 hours as needed., Disp: 30 Tab, Rfl: 1  ALLERGIES:   Allergies   Allergen Reactions   • Inapsine [Droperidol] Anaphylaxis   • Keflex Rash and Swelling     RASH & SWELLING   • Morphine Unspecified     STOPS PEEING     • Penicillins Rash and Swelling     RASH & SWELLING     SURGHX:   Past Surgical History:   Procedure Laterality Date   • PELVISCOPY N/A 9/9/2018    Procedure: PELVISCOPY;  Surgeon: Dianen Martinez M.D.;  Location: SURGERY [facility];  Service: Gyn Robotic   • OOPHORECTOMY Left 9/9/2018    Procedure: SALPINGOOPHORECTOMY;  Surgeon: Dianne Martinez M.D.;  Location: SURGERY [facility];  Service: Gyn Robotic   • REPEAT C SECTION  6/18/2014    Performed by Corinne E Capurro, M.D. at LABOR AND DELIVERY   • GYN SURGERY  2007    csection   • OTHER  2003    L BREAST LUMPECTOMY   • BLANCA BY LAPAROSCOPY     • CHOLECYSTECTOMY     • IMPLANTABLE CARDIOVERTER DEFIBRILLATOR (ICD)     • OTHER      gallbladder removed   • OTHER      cardiac ablations x 6   • OTHER CARDIAC SURGERY      6 ablation   • PRIMARY C SECTION       SOCHX:  reports that she has never smoked. She has never used smokeless tobacco. She reports that she does not drink alcohol or use drugs.  FH: family history  "includes Asthma in her mother and son; Diabetes in her father, maternal aunt, paternal aunt, and paternal grandmother; Lung Disease in her mother and son; Other in her maternal grandfather and mother; Psychiatry in her paternal grandmother; Thyroid in her mother and sister.            Patient presents with:  Otalgia: Rt ear pain, Rt neck pain, Rt side sinus pressure x 1 wk           Otalgia    There is pain in the right ear. This is a new problem. The current episode started in the past 7 days. The problem occurs constantly. The problem has been gradually worsening. There has been no fever. The pain is moderate. Associated symptoms include a sore throat. Pertinent negatives include no coughing. She has tried NSAIDs for the symptoms. The treatment provided mild relief.       Review of Systems   Constitutional: Positive for fever.   HENT: Positive for congestion, ear pain, sinus pain and sore throat.    Respiratory: Negative for cough.    Endo/Heme/Allergies: Positive for environmental allergies.   All other systems reviewed and are negative.         Objective:     /72 (BP Location: Left arm, Patient Position: Sitting, BP Cuff Size: Adult)   Pulse 72   Temp 36.6 °C (97.9 °F) (Temporal)   Ht 1.626 m (5' 4\")   Wt 77.6 kg (171 lb)   SpO2 97%   BMI 29.35 kg/m²      Physical Exam   Constitutional: She is oriented to person, place, and time. She appears well-developed and well-nourished. No distress.   HENT:   Head: Normocephalic and atraumatic.   Right Ear: A middle ear effusion (clear) is present.   Left Ear: Tympanic membrane normal.   Nose: Mucosal edema and rhinorrhea present.   Mouth/Throat: Uvula is midline. Posterior oropharyngeal erythema present. No tonsillar exudate.   Eyes: Pupils are equal, round, and reactive to light. Conjunctivae and EOM are normal.   Neck: Normal range of motion. Neck supple.   Cardiovascular: Normal rate.    Pulmonary/Chest: Effort normal.   Musculoskeletal: Normal range of " motion.   Neurological: She is alert and oriented to person, place, and time.   Skin: Skin is warm and dry. Capillary refill takes less than 2 seconds.   Psychiatric: She has a normal mood and affect.   Nursing note and vitals reviewed.       Assessment/Plan:     1. Sore throat  POCT Rapid Strep A    azithromycin (ZITHROMAX) 250 MG Tab   2. Acute URI  azithromycin (ZITHROMAX) 250 MG Tab     Discussed that I felt this was viral in nature. Did not see any evidence of a bacterial process. Discussed natural progression and sx care.    Contingent antibiotic prescription given to patient to fill upon meeting criteria of guidelines discussed.     PT can continue OTC medications, increase fluids and rest until symptoms improve.     PT should follow up with PCP in 1-2 days for re-evaluation if symptoms have not improved.  Discussed red flags and reasons to return to UC or ED.  Pt and/or family verbalized understanding of diagnosis and follow up instructions and was offered informational handout on diagnosis.  PT discharged.

## 2018-11-08 ENCOUNTER — APPOINTMENT (OUTPATIENT)
Dept: RADIOLOGY | Facility: MEDICAL CENTER | Age: 38
End: 2018-11-08
Attending: EMERGENCY MEDICINE
Payer: COMMERCIAL

## 2018-11-08 ENCOUNTER — HOSPITAL ENCOUNTER (EMERGENCY)
Facility: MEDICAL CENTER | Age: 38
End: 2018-11-08
Attending: EMERGENCY MEDICINE
Payer: COMMERCIAL

## 2018-11-08 VITALS
BODY MASS INDEX: 29.24 KG/M2 | WEIGHT: 171.3 LBS | SYSTOLIC BLOOD PRESSURE: 117 MMHG | DIASTOLIC BLOOD PRESSURE: 73 MMHG | TEMPERATURE: 97.9 F | OXYGEN SATURATION: 98 % | HEART RATE: 72 BPM | HEIGHT: 64 IN | RESPIRATION RATE: 18 BRPM

## 2018-11-08 DIAGNOSIS — N94.89 PELVIC CONGESTION SYNDROME: ICD-10-CM

## 2018-11-08 DIAGNOSIS — R10.2 PELVIC PAIN: ICD-10-CM

## 2018-11-08 DIAGNOSIS — R00.2 PALPITATIONS: ICD-10-CM

## 2018-11-08 DIAGNOSIS — M79.604 PAIN OF RIGHT LOWER EXTREMITY: ICD-10-CM

## 2018-11-08 LAB
ALBUMIN SERPL BCP-MCNC: 4.6 G/DL (ref 3.2–4.9)
ALBUMIN/GLOB SERPL: 2.1 G/DL
ALP SERPL-CCNC: 40 U/L (ref 30–99)
ALT SERPL-CCNC: 16 U/L (ref 2–50)
ANION GAP SERPL CALC-SCNC: 8 MMOL/L (ref 0–11.9)
AST SERPL-CCNC: 12 U/L (ref 12–45)
BASOPHILS # BLD AUTO: 0.6 % (ref 0–1.8)
BASOPHILS # BLD: 0.05 K/UL (ref 0–0.12)
BILIRUB SERPL-MCNC: 1.6 MG/DL (ref 0.1–1.5)
BUN SERPL-MCNC: 11 MG/DL (ref 8–22)
CALCIUM SERPL-MCNC: 9.4 MG/DL (ref 8.5–10.5)
CHLORIDE SERPL-SCNC: 107 MMOL/L (ref 96–112)
CO2 SERPL-SCNC: 22 MMOL/L (ref 20–33)
CREAT SERPL-MCNC: 0.41 MG/DL (ref 0.5–1.4)
EKG IMPRESSION: NORMAL
EOSINOPHIL # BLD AUTO: 0.17 K/UL (ref 0–0.51)
EOSINOPHIL NFR BLD: 1.9 % (ref 0–6.9)
ERYTHROCYTE [DISTWIDTH] IN BLOOD BY AUTOMATED COUNT: 41.3 FL (ref 35.9–50)
GLOBULIN SER CALC-MCNC: 2.2 G/DL (ref 1.9–3.5)
GLUCOSE SERPL-MCNC: 85 MG/DL (ref 65–99)
HCT VFR BLD AUTO: 40.4 % (ref 37–47)
HGB BLD-MCNC: 14.6 G/DL (ref 12–16)
IMM GRANULOCYTES # BLD AUTO: 0.06 K/UL (ref 0–0.11)
IMM GRANULOCYTES NFR BLD AUTO: 0.7 % (ref 0–0.9)
LIPASE SERPL-CCNC: 9 U/L (ref 11–82)
LYMPHOCYTES # BLD AUTO: 2.38 K/UL (ref 1–4.8)
LYMPHOCYTES NFR BLD: 26.9 % (ref 22–41)
MCH RBC QN AUTO: 30 PG (ref 27–33)
MCHC RBC AUTO-ENTMCNC: 36.1 G/DL (ref 33.6–35)
MCV RBC AUTO: 83.1 FL (ref 81.4–97.8)
MONOCYTES # BLD AUTO: 0.83 K/UL (ref 0–0.85)
MONOCYTES NFR BLD AUTO: 9.4 % (ref 0–13.4)
NEUTROPHILS # BLD AUTO: 5.36 K/UL (ref 2–7.15)
NEUTROPHILS NFR BLD: 60.5 % (ref 44–72)
NRBC # BLD AUTO: 0 K/UL
NRBC BLD-RTO: 0 /100 WBC
PLATELET # BLD AUTO: 254 K/UL (ref 164–446)
PMV BLD AUTO: 11.3 FL (ref 9–12.9)
POTASSIUM SERPL-SCNC: 4 MMOL/L (ref 3.6–5.5)
PROT SERPL-MCNC: 6.8 G/DL (ref 6–8.2)
RBC # BLD AUTO: 4.86 M/UL (ref 4.2–5.4)
SODIUM SERPL-SCNC: 137 MMOL/L (ref 135–145)
WBC # BLD AUTO: 8.9 K/UL (ref 4.8–10.8)

## 2018-11-08 PROCEDURE — 93005 ELECTROCARDIOGRAM TRACING: CPT

## 2018-11-08 PROCEDURE — 700117 HCHG RX CONTRAST REV CODE 255: Performed by: EMERGENCY MEDICINE

## 2018-11-08 PROCEDURE — 99284 EMERGENCY DEPT VISIT MOD MDM: CPT

## 2018-11-08 PROCEDURE — 83690 ASSAY OF LIPASE: CPT

## 2018-11-08 PROCEDURE — 93971 EXTREMITY STUDY: CPT | Mod: RT

## 2018-11-08 PROCEDURE — 85025 COMPLETE CBC W/AUTO DIFF WBC: CPT

## 2018-11-08 PROCEDURE — 74177 CT ABD & PELVIS W/CONTRAST: CPT

## 2018-11-08 PROCEDURE — 36415 COLL VENOUS BLD VENIPUNCTURE: CPT

## 2018-11-08 PROCEDURE — 93005 ELECTROCARDIOGRAM TRACING: CPT | Performed by: EMERGENCY MEDICINE

## 2018-11-08 PROCEDURE — 80053 COMPREHEN METABOLIC PANEL: CPT

## 2018-11-08 RX ADMIN — IOHEXOL 100 ML: 350 INJECTION, SOLUTION INTRAVENOUS at 13:04

## 2018-11-08 ASSESSMENT — PAIN SCALES - GENERAL
PAINLEVEL_OUTOF10: 8
PAINLEVEL_OUTOF10: 3

## 2018-11-08 NOTE — ED PROVIDER NOTES
ED Provider Note    Scribed for Tyshawn Tineo M.D. by Lloyd Powell. 11/8/2018  11:48 AM    Primary care provider: KALEIGH Bucio  Means of arrival: Walk in  History obtained from: Patient  History limited by: None    CHIEF COMPLAINT  Chief Complaint   Patient presents with   • Palpitations     x 1 day -denies SOB, weakness, light-headedness   • Leg Pain     x2 days - denies trauma. States pain starts in groin and extends down thigh.        HPI  Heidi Plata is a 38 y.o. female who presents to the Emergency Department right leg pain that began 3 days ago. She states the pain is constant and feels like an ache that radiates up to her lower abdomen. Heidi states that nothing is able to relieve the pain and nothing makes it worse. She denies any fall or trauma to her leg that may have cause her symptoms. She does have tingling in her inner thigh and some right leg swelling, but denies weakness. She also denies any back pain. Patient had a right oophorectomy September 9 and she is not currently taking hormones. The pain in her abdomen has been intermittent since her surgery. She states the pain in her abdomen is different than the pain she had experienced with her torsion. She denies any seizures, nausea, vomiting, or blood in her urine. Her last menstrual period was before over 2 months ago and her last BM was this morning that was normal. Patient still has her appendix.    Heidi is also complaining of heart palpitations that began yesterday. She denies tachycardia and states the palpitations are intermittent. She does not currently have a cardiologist. Her mother has Factor V Leiden.    REVIEW OF SYSTEMS  Pertinent positives include right leg pain, swelling, lower abdominal pain, heart palpitations. Pertinent negatives include no seizures, nausea, vomiting, or blood in her urine, tachycardia, weakness.  All other systems reviewed and negative.    PAST MEDICAL HISTORY   has a past  medical history of Angina; ASTHMA; Bronchitis; Cardiac arrhythmia; Chickenpox; Influenza; Kidney, horseshoe; Cruz's syndrome; Other acquired deformity of toe; Personal history of venous thrombosis and embolism; Pneumonia; PSVT (paroxysmal supraventricular tachycardia) (HCC); S/P ablation of ventricular arrhythmia; Ramirez-Parkinson-White syndrome; and WPW (Marisela-Parkinson-White syndrome).    SURGICAL HISTORY   has a past surgical history that includes implantable cardioverter defibrillator (icd); qian by laparoscopy; other cardiac surgery; gyn surgery (2007); other (2003); other; repeat c section (6/18/2014); primary c section; cholecystectomy; other; pelviscopy (N/A, 9/9/2018); and oophorectomy (Left, 9/9/2018).    SOCIAL HISTORY  Social History   Substance Use Topics   • Smoking status: Never Smoker   • Smokeless tobacco: Never Used   • Alcohol use No      History   Drug Use No       FAMILY HISTORY  Family History   Problem Relation Age of Onset   • Asthma Mother    • Lung Disease Mother         PE   • Other Mother         clotting disease   • Thyroid Mother    • Lung Disease Son         sees pediatric pulmonologist   • Asthma Son    • Diabetes Father    • Thyroid Sister    • Diabetes Maternal Aunt    • Other Maternal Grandfather         CHF   • Diabetes Paternal Aunt    • Diabetes Paternal Grandmother    • Psychiatry Paternal Grandmother         alzheimers       CURRENT MEDICATIONS  Home Medications     Reviewed by Lizzie Mir R.N. (Registered Nurse) on 11/08/18 at 1053  Med List Status: Complete   Medication Last Dose Status   azithromycin (ZITHROMAX) 250 MG Tab Not taking Active   ibuprofen (MOTRIN) 600 MG Tab Not taking Active                ALLERGIES  Allergies   Allergen Reactions   • Inapsine [Droperidol] Anaphylaxis   • Keflex Rash and Swelling     RASH & SWELLING   • Morphine Unspecified     STOPS PEEING     • Penicillins Rash and Swelling     RASH & SWELLING       PHYSICAL EXAM  VITAL SIGNS: BP  "146/74   Pulse 80   Temp 36.6 °C (97.9 °F)   Resp 18   Ht 1.626 m (5' 4\")   Wt 77.7 kg (171 lb 4.8 oz)   SpO2 97%   BMI 29.40 kg/m²     Constitutional: Well developed, Well nourished, mild distress, Non-toxic appearance.   HENT: Normocephalic, Atraumatic, Bilateral external ears normal, Oropharynx moist, No oral exudates.   Eyes: PERRLA, EOMI, Conjunctiva normal, No discharge.   Cardiovascular: Normal heart rate, Normal rhythm.   Thorax & Lungs: Clear to auscultation bilaterally, No respiratory distress, No wheezing, No crackles.   Abdomen: Focal tenderness in right lower quadrant, Soft, No masses, No pulsatile masses.   Skin: Warm, Dry, No erythema, No rash.   Extremities:, Slight tenderness on medial aspect of right leg. Trace lower extremity edema. Varicose veins on medial thigh. No cyanosis.   Musculoskeletal: Full range of motion of hip with no increases pain with hip flexion or adduction. No major deformities noted.  2+ pulses distally.  Neurologic: Awake, alert. Moves all extremities spontaneously.  Psychiatric: Affect normal, Judgment normal, Mood normal.     LABS  Results for orders placed or performed during the hospital encounter of 11/08/18   CBC WITH DIFFERENTIAL   Result Value Ref Range    WBC 8.9 4.8 - 10.8 K/uL    RBC 4.86 4.20 - 5.40 M/uL    Hemoglobin 14.6 12.0 - 16.0 g/dL    Hematocrit 40.4 37.0 - 47.0 %    MCV 83.1 81.4 - 97.8 fL    MCH 30.0 27.0 - 33.0 pg    MCHC 36.1 (H) 33.6 - 35.0 g/dL    RDW 41.3 35.9 - 50.0 fL    Platelet Count 254 164 - 446 K/uL    MPV 11.3 9.0 - 12.9 fL    Neutrophils-Polys 60.50 44.00 - 72.00 %    Lymphocytes 26.90 22.00 - 41.00 %    Monocytes 9.40 0.00 - 13.40 %    Eosinophils 1.90 0.00 - 6.90 %    Basophils 0.60 0.00 - 1.80 %    Immature Granulocytes 0.70 0.00 - 0.90 %    Nucleated RBC 0.00 /100 WBC    Neutrophils (Absolute) 5.36 2.00 - 7.15 K/uL    Lymphs (Absolute) 2.38 1.00 - 4.80 K/uL    Monos (Absolute) 0.83 0.00 - 0.85 K/uL    Eos (Absolute) 0.17 0.00 - " 0.51 K/uL    Baso (Absolute) 0.05 0.00 - 0.12 K/uL    Immature Granulocytes (abs) 0.06 0.00 - 0.11 K/uL    NRBC (Absolute) 0.00 K/uL   COMP METABOLIC PANEL   Result Value Ref Range    Sodium 137 135 - 145 mmol/L    Potassium 4.0 3.6 - 5.5 mmol/L    Chloride 107 96 - 112 mmol/L    Co2 22 20 - 33 mmol/L    Anion Gap 8.0 0.0 - 11.9    Glucose 85 65 - 99 mg/dL    Bun 11 8 - 22 mg/dL    Creatinine 0.41 (L) 0.50 - 1.40 mg/dL    Calcium 9.4 8.5 - 10.5 mg/dL    AST(SGOT) 12 12 - 45 U/L    ALT(SGPT) 16 2 - 50 U/L    Alkaline Phosphatase 40 30 - 99 U/L    Total Bilirubin 1.6 (H) 0.1 - 1.5 mg/dL    Albumin 4.6 3.2 - 4.9 g/dL    Total Protein 6.8 6.0 - 8.2 g/dL    Globulin 2.2 1.9 - 3.5 g/dL    A-G Ratio 2.1 g/dL   LIPASE   Result Value Ref Range    Lipase 9 (L) 11 - 82 U/L   ESTIMATED GFR   Result Value Ref Range    GFR If African American >60 >60 mL/min/1.73 m 2    GFR If Non African American >60 >60 mL/min/1.73 m 2   EKG (NOW)   Result Value Ref Range    Report       [facility] Emergency Dept.    Test Date:  2018  Pt Name:    EMILE TOLEDO                 Department: ER  MRN:        [id number]                      Room:  Gender:     Female                       Technician: [id number]  :        1980                   Requested By:ER TRIAGE PROTOCOL  Order #:    [id number]                    Reading MD: ANGELA CROUCH MD    Measurements  Intervals                                Axis  Rate:       77                           P:          32  NM:         184                          QRS:        -109  QRSD:       144                          T:          53  QT:         432  QTc:        489    Interpretive Statements  SINUS RHYTHM  RBBB AND LPFB  Compared to ECG 2018 17:27:35  No significant changes    Electronically Signed On 2018 11:47:11 PST by ANGELA CROUCH MD        All labs reviewed by me.    EKG  See labs above. EKG interpreted by me.    RADIOLOGY  CT-ABDOMEN-PELVIS WITH   Final  Result      1.  Unchanged dilated right adnexal and right retroperitoneal veins which may represent chronic pelvic congestion.      2.  Normal CT appearance of the appendix. No right lower quadrant inflammation.      3.  Unchanged prominent uterus. Normal appearance of the left ovary.      4.  Surgically absent right ovary.      5.  No evidence of bowel or renal obstruction.      US-EXTREMITY VENOUS LOWER UNILAT RIGHT   Final Result        The radiologist's interpretation of all radiological studies have been reviewed by me.      COURSE & MEDICAL DECISION MAKING  Pertinent Labs & Imaging studies reviewed. (See chart for details)    I reviewed the patient's medical records which showed the patient had a history of WPW status post ablation.    11:48 AM - Patient seen and examined at bedside. Ordered CT abdomen-pelvis, US extremity venous lower unilateral right, CBC, CMP, lipase, UA, EKG to evaluate her symptoms. The differential diagnoses include but are not limited to: appendicitis, DVT, ovarian cyst.    2:11 PM - I informed the patient that she recently had workup performed that shows she has dilation and inflammation of her pelvic floor which may be causing her current pain. Imaging did not indicate any blood clot however I advised her to follow up with her gynecologist. She understands and agrees to the discharge plan of care.    Decision Making:  Patient with nonspecific right medial thigh pain of uncertain etiology, the patient recently had ago, therefore the patient is concerned about a DVT, ultrasound was negative, the patient also has right lower quadrant abdominal tenderness that I was concerned about the possibility of appendicitis and therefore CT scan was obtained, this shows pelvic vascular congestion possible pelvic congestion syndrome.  Discussed this with the patient to follow-up with her OB/GYN, return with increasing pain fevers vomiting or any other concerns.    The patient will return for new or  worsening symptoms and is stable at the time of discharge.    The patient is referred to a primary physician for blood pressure management, diabetic screening, and for all other preventative health concerns.    DISPOSITION:  Patient will be discharged home in stable condition.    FOLLOW UP:  [facility], Emergency Dept  [address]  [place] [id number] [phone]    If symptoms worsen    Federico Fritz, A.P.R.N.  [address]  [facility] [address] [phone]          Mercedes Juan M.D.  [address]  [address]  [facility] [id number] [phone]            OUTPATIENT MEDICATIONS:  Discharge Medication List as of 11/8/2018  2:26 PM           FINAL IMPRESSION  1. Pelvic pain    2. Pelvic congestion syndrome    3. Pain of right lower extremity    4. Palpitations          ILloyd (Scribe), am scribing for, and in the presence of, Tyshawn Tineo M.D..    Electronically signed by: Lloyd Powell (Scribe), 11/8/2018    ITyshawn M.D. personally performed the services described in this documentation, as scribed by Lloyd Powell in my presence, and it is both accurate and complete. C.    The note accurately reflects work and decisions made by me.  Tyshawn Tineo  11/8/2018  5:38 PM

## 2018-11-08 NOTE — ED TRIAGE NOTES
Heidi Plata  38 y.o.  Chief Complaint   Patient presents with   • Palpitations     x 1 day -denies SOB, weakness, light-headedness   • Leg Pain     x2 days - denies trauma. States pain starts in groin and extends down thigh.      Pt self ambulatory to triage room, steady gait. NAD noted.       Pt has a hx sig for WPW syndrome. PT states it was tx with several ablations and these palpitations feel different.     Triage process explained to patient, educated pt to notify staff at  if s/s worsened, apologized for wait time, and returned pt to lobby.

## 2018-11-12 ENCOUNTER — TELEPHONE (OUTPATIENT)
Dept: MEDICAL GROUP | Facility: PHYSICIAN GROUP | Age: 38
End: 2018-11-12

## 2018-11-12 ENCOUNTER — OFFICE VISIT (OUTPATIENT)
Dept: CARDIOLOGY | Facility: MEDICAL CENTER | Age: 38
End: 2018-11-12
Payer: COMMERCIAL

## 2018-11-12 VITALS
WEIGHT: 169 LBS | HEART RATE: 56 BPM | HEIGHT: 64 IN | OXYGEN SATURATION: 100 % | DIASTOLIC BLOOD PRESSURE: 70 MMHG | BODY MASS INDEX: 28.85 KG/M2 | SYSTOLIC BLOOD PRESSURE: 118 MMHG

## 2018-11-12 DIAGNOSIS — I45.6 WPW (WOLFF-PARKINSON-WHITE SYNDROME): ICD-10-CM

## 2018-11-12 DIAGNOSIS — R00.2 PALPITATIONS: ICD-10-CM

## 2018-11-12 DIAGNOSIS — Z98.890 S/P ABLATION OF ACCESSORY BYPASS TRACT: ICD-10-CM

## 2018-11-12 PROCEDURE — 99204 OFFICE O/P NEW MOD 45 MIN: CPT | Performed by: INTERNAL MEDICINE

## 2018-11-12 ASSESSMENT — ENCOUNTER SYMPTOMS
SHORTNESS OF BREATH: 0
LOSS OF CONSCIOUSNESS: 0
PALPITATIONS: 1
MYALGIAS: 0
BLURRED VISION: 1
FEVER: 0
ABDOMINAL PAIN: 0
PND: 0
ORTHOPNEA: 1
CHILLS: 0
DIZZINESS: 0
INSOMNIA: 0

## 2018-11-12 NOTE — PROGRESS NOTES
Chief Complaint   Patient presents with   • Marisela-Parkinson-White Syndrome       Subjective:   Heidi Plata is a 38 y.o. female who presents today referred by her PCP Federico Iqbal for cardiology consultation for evaluation of palpitations.    Patient has significant history of WPW diagnosed at age 10, several x6 ablation procedures at [facility] last one in 2006 in [facility] by Dr. Samy Novak electrophysiologist including implantable loop recorder at that time.  Subsequently she had been seen by Dr. Purnima Mims and more recently followed by Dr. Pancho Howard at [facility] last seen 6 years ago.    The patient reports a one-year history of increasing significant episodes of daily palpitations becoming much worse waking her up at night and causing her to not feel well.  No syncope.    In 9/2018 the patient had an emergent oophorectomy for torsion.  On 11/8/2018 the patient was seen in the emergency room for some abdominal discomfort and palpitations.    This patient does not smoke cigarettes, drink alcohol or use recreational drugs.    Past Medical History:   Diagnosis Date   • Angina    • ASTHMA    • Bronchitis    • Cardiac arrhythmia    • Chickenpox    • Influenza    • Kidney, horseshoe    • Cruz's syndrome    • Other acquired deformity of toe    • Personal history of venous thrombosis and embolism     states when she was little she had problems clotting   • Pneumonia    • PSVT (paroxysmal supraventricular tachycardia) ([facility])    • S/P ablation of ventricular arrhythmia     x6 (most recent was 2006)   • Ramirez-Parkinson-White syndrome    • WPW (Marisela-Parkinson-White syndrome)      Past Surgical History:   Procedure Laterality Date   • PELVISCOPY N/A 9/9/2018    Procedure: PELVISCOPY;  Surgeon: Dianne Martinez M.D.;  Location: SURGERY [facility];  Service: Gyn Robotic   • OOPHORECTOMY Left 9/9/2018    Procedure: SALPINGOOPHORECTOMY;  Surgeon: Dianne Martinez M.D.;  Location:  SURGERY [facility];  Service: Gyn Robotic   • REPEAT C SECTION  6/18/2014    Performed by Corinne E Capurro, M.D. at LABOR AND DELIVERY   • GYN SURGERY  2007    csection   • OTHER  2003    L BREAST LUMPECTOMY   • BLANCA BY LAPAROSCOPY     • CHOLECYSTECTOMY     • IMPLANTABLE CARDIOVERTER DEFIBRILLATOR (ICD)     • OTHER      gallbladder removed   • OTHER      cardiac ablations x 6   • OTHER CARDIAC SURGERY      6 ablation   • PRIMARY C SECTION       Family History   Problem Relation Age of Onset   • Asthma Mother    • Lung Disease Mother         PE   • Other Mother         clotting disease   • Thyroid Mother    • Lung Disease Son         sees pediatric pulmonologist   • Asthma Son    • Diabetes Father    • Thyroid Sister    • Diabetes Maternal Aunt    • Other Maternal Grandfather         CHF   • Diabetes Paternal Aunt    • Diabetes Paternal Grandmother    • Psychiatry Paternal Grandmother         alzheimers     Social History     Social History   • Marital status:      Spouse name: N/A   • Number of children: N/A   • Years of education: N/A     Occupational History   • Not on file.     Social History Main Topics   • Smoking status: Never Smoker   • Smokeless tobacco: Never Used   • Alcohol use No   • Drug use: No   • Sexual activity: Yes     Partners: Male     Birth control/ protection: Surgical     Other Topics Concern   • Not on file     Social History Narrative   • No narrative on file     Allergies   Allergen Reactions   • Inapsine [Droperidol] Anaphylaxis   • Keflex Rash and Swelling     RASH & SWELLING   • Morphine Unspecified     STOPS PEEING     • Penicillins Rash and Swelling     RASH & SWELLING     Outpatient Encounter Prescriptions as of 11/12/2018   Medication Sig Dispense Refill   • azithromycin (ZITHROMAX) 250 MG Tab Take 2 tabs by mouth once today, then one tab by mouth once daily days 2-5.  Complete all medication. 6 Tab 0   • ibuprofen (MOTRIN) 600 MG Tab Take 1 Tab by mouth every 6 hours  "as needed. 30 Tab 1     No facility-administered encounter medications on file as of 11/12/2018.      Review of Systems   Constitutional: Positive for malaise/fatigue. Negative for chills and fever.   HENT: Negative for congestion.    Eyes: Positive for blurred vision.   Respiratory: Negative for shortness of breath.    Cardiovascular: Positive for palpitations, orthopnea and leg swelling. Negative for chest pain and PND.   Gastrointestinal: Negative for abdominal pain.   Genitourinary: Negative for dysuria.   Musculoskeletal: Negative for joint pain and myalgias.   Skin: Negative for rash.   Neurological: Negative for dizziness and loss of consciousness.   Endo/Heme/Allergies: Positive for environmental allergies.   Psychiatric/Behavioral: The patient does not have insomnia.         Objective:   /70 (BP Location: Left arm, Patient Position: Sitting, BP Cuff Size: Adult)   Pulse (!) 56   Ht 1.626 m (5' 4\")   Wt 76.7 kg (169 lb)   SpO2 100%   BMI 29.01 kg/m²     Physical Exam   Constitutional: She is oriented to person, place, and time. She appears well-developed and well-nourished.   HENT:   Head: Normocephalic and atraumatic.   Eyes: Pupils are equal, round, and reactive to light. EOM are normal. No scleral icterus.   Neck: Neck supple. No JVD present. No thyromegaly present.   Cardiovascular: Normal rate, regular rhythm, normal heart sounds and intact distal pulses.  Exam reveals no gallop and no friction rub.    No murmur heard.  Pulmonary/Chest: Effort normal and breath sounds normal. No respiratory distress. She has no wheezes. She has no rales.   Abdominal: Soft. Bowel sounds are normal. She exhibits no mass. There is no tenderness.   Musculoskeletal: Normal range of motion. She exhibits no edema or deformity.   Lymphadenopathy:     She has no cervical adenopathy.   Neurological: She is alert and oriented to person, place, and time. No cranial nerve deficit. She exhibits normal muscle tone.   Skin: " Skin is warm and dry.   Psychiatric: She has a normal mood and affect. Her behavior is normal.     EKG 11/08/2018 normal sinus rhythm, rate 77.  Left anterior hemiblock.  Right bundle branch block.  Reviewed by myself.    Assessment:     1. Palpitations  Holter Monitor Study    EC-ECHOCARDIOGRAM COMPLETE W/O CONT   2. S/P ablation of accessory bypass tract     3. WPW (Marisela-Parkinson-White syndrome)         Medical Decision Making:  Today's Assessment / Status / Plan:     Recommendation Discussion  1.  I reviewed with the patient and her  the significance of her current symptoms of palpitations in the context of her history of WPW and previous ablation procedures and her abnormal EKG.  2.  To evaluate the patient's palpitations will proceed with a 48-hour Holter monitor and echocardiogram  3.  Obtain medical records from Dr. Pancho Howard's office.  Monitor and echocardiogram.    Thank you for allowing me see this patient in consultation

## 2018-11-26 ENCOUNTER — NON-PROVIDER VISIT (OUTPATIENT)
Dept: CARDIOLOGY | Facility: MEDICAL CENTER | Age: 38
End: 2018-11-26
Payer: COMMERCIAL

## 2018-11-26 ENCOUNTER — HOSPITAL ENCOUNTER (OUTPATIENT)
Dept: CARDIOLOGY | Facility: MEDICAL CENTER | Age: 38
End: 2018-11-26
Attending: INTERNAL MEDICINE
Payer: COMMERCIAL

## 2018-11-26 DIAGNOSIS — R00.2 PALPITATIONS: ICD-10-CM

## 2018-11-26 LAB
LV EJECT FRACT  99904: 65
LV EJECT FRACT MOD 2C 99903: 70.51
LV EJECT FRACT MOD 4C 99902: 73.43
LV EJECT FRACT MOD BP 99901: 74.01

## 2018-11-26 PROCEDURE — 93306 TTE W/DOPPLER COMPLETE: CPT

## 2018-11-26 PROCEDURE — 93306 TTE W/DOPPLER COMPLETE: CPT | Mod: 26 | Performed by: INTERNAL MEDICINE

## 2018-11-28 ENCOUNTER — TELEPHONE (OUTPATIENT)
Dept: CARDIOLOGY | Facility: MEDICAL CENTER | Age: 38
End: 2018-11-28

## 2018-11-28 LAB — EKG IMPRESSION: NORMAL

## 2018-11-28 PROCEDURE — 93224 XTRNL ECG REC UP TO 48 HRS: CPT | Performed by: INTERNAL MEDICINE

## 2018-11-28 NOTE — TELEPHONE ENCOUNTER
Echocardiogram report   Received: Today   Message Contents   Se Rodriguez M.D.  Brit Read R.N.             Please let the patient know that her echocardiogram is normal.   Holter monitor pending   Thank you      Attempted to contact patient, no answer. LVM. Advised to call back with any questions or concerns.

## 2018-12-03 ENCOUNTER — TELEPHONE (OUTPATIENT)
Dept: CARDIOLOGY | Facility: MEDICAL CENTER | Age: 38
End: 2018-12-03

## 2018-12-03 NOTE — TELEPHONE ENCOUNTER
Holter monitor results   Received: 3 days ago   Message Contents   Se Rodriguez M.D.  Brit Read, R.N.             Holter monitor is unremarkable   No diary returned.   No additional recommendations unless still having problems or concerns.   No cardiac limitations.      Contacted patient, discussed above.  Patient denies any current concerns.  Verbalizes all understanding.

## 2019-01-25 ENCOUNTER — OFFICE VISIT (OUTPATIENT)
Dept: URGENT CARE | Facility: PHYSICIAN GROUP | Age: 39
End: 2019-01-25
Payer: COMMERCIAL

## 2019-01-25 VITALS
BODY MASS INDEX: 28.85 KG/M2 | HEIGHT: 64 IN | TEMPERATURE: 97.8 F | DIASTOLIC BLOOD PRESSURE: 76 MMHG | OXYGEN SATURATION: 96 % | WEIGHT: 169 LBS | HEART RATE: 72 BPM | RESPIRATION RATE: 16 BRPM | SYSTOLIC BLOOD PRESSURE: 120 MMHG

## 2019-01-25 DIAGNOSIS — J01.00 SUBACUTE MAXILLARY SINUSITIS: ICD-10-CM

## 2019-01-25 DIAGNOSIS — J45.20 MILD INTERMITTENT ASTHMA WITHOUT COMPLICATION: ICD-10-CM

## 2019-01-25 PROCEDURE — 99213 OFFICE O/P EST LOW 20 MIN: CPT | Performed by: EMERGENCY MEDICINE

## 2019-01-25 RX ORDER — ALBUTEROL SULFATE 90 UG/1
2 AEROSOL, METERED RESPIRATORY (INHALATION) EVERY 6 HOURS PRN
Qty: 8.5 G | Refills: 1 | Status: SHIPPED | OUTPATIENT
Start: 2019-01-25 | End: 2019-09-23

## 2019-01-25 RX ORDER — DOXYCYCLINE HYCLATE 100 MG
100 TABLET ORAL 2 TIMES DAILY
Qty: 20 TAB | Refills: 0 | Status: SHIPPED | OUTPATIENT
Start: 2019-01-25 | End: 2019-02-22

## 2019-01-25 NOTE — PROGRESS NOTES
Subjective:      Heidi Gaspar is a 38 y.o. female who presents with Sinusitis (x 1 month )            HPI  Pt with sinus congestion for the past month with bilateral tooth pain ocassional yellow discharge occasionally bloody.  Patient has no fever chills nausea vomiting or diarrhea.    PMH:  has a past medical history of Angina; ASTHMA; Bronchitis; Cardiac arrhythmia; Chickenpox; Influenza; Kidney, horseshoe; [place]'s syndrome; Other acquired deformity of toe; Personal history of venous thrombosis and embolism; Pneumonia; PSVT (paroxysmal supraventricular tachycardia) (HCC); S/P ablation of ventricular arrhythmia; Ramirez-Parkinson-White syndrome; and WPW (Marisela-Parkinson-White syndrome). She also has no past medical history of Backpain; CATARACT; COPD; Diabetes; or Dialysis.  MEDS:   Current Outpatient Prescriptions:   •  doxycycline (VIBRAMYCIN) 100 MG Tab, Take 1 Tab by mouth 2 times a day., Disp: 20 Tab, Rfl: 0  •  albuterol 108 (90 Base) MCG/ACT Aero Soln inhalation aerosol, Inhale 2 Puffs by mouth every 6 hours as needed for Shortness of Breath., Disp: 8.5 g, Rfl: 1  •  azithromycin (ZITHROMAX) 250 MG Tab, Take 2 tabs by mouth once today, then one tab by mouth once daily days 2-5.  Complete all medication. (Patient not taking: Reported on 1/25/2019), Disp: 6 Tab, Rfl: 0  •  ibuprofen (MOTRIN) 600 MG Tab, Take 1 Tab by mouth every 6 hours as needed. (Patient not taking: Reported on 1/25/2019), Disp: 30 Tab, Rfl: 1  ALLERGIES:   Allergies   Allergen Reactions   • Inapsine [Droperidol] Anaphylaxis   • Keflex Rash and Swelling     RASH & SWELLING   • Morphine Unspecified     STOPS PEEING     • Penicillins Rash and Swelling     RASH & SWELLING     SURGHX:   Past Surgical History:   Procedure Laterality Date   • PELVISCOPY N/A 9/9/2018    Procedure: PELVISCOPY;  Surgeon: Dianne Martinez M.D.;  Location: SURGERY [facility];  Service: Gyn Robotic   • OOPHORECTOMY Left 9/9/2018    Procedure:  "SALPINGOOPHORECTOMY;  Surgeon: Dianne Martinez M.D.;  Location: SURGERY [facility];  Service: Gyn Robotic   • REPEAT C SECTION  6/18/2014    Performed by Corinne E Capurro, M.D. at LABOR AND DELIVERY   • GYN SURGERY  2007    csection   • OTHER  2003    L BREAST LUMPECTOMY   • BLANCA BY LAPAROSCOPY     • CHOLECYSTECTOMY     • IMPLANTABLE CARDIOVERTER DEFIBRILLATOR (ICD)     • OTHER      gallbladder removed   • OTHER      cardiac ablations x 6   • OTHER CARDIAC SURGERY      6 ablation   • PRIMARY C SECTION       SOCHX:  reports that she has never smoked. She has never used smokeless tobacco. She reports that she does not drink alcohol or use drugs.  FH: Reviewed with patient, not pertinent to this visit.   Review of Systems   Constitutional: Negative for fever.   HENT: Positive for congestion and nosebleeds. Negative for ear discharge.         Patient complains that her maxillary teeth hurt she also has maxillary congestion   Eyes: Negative for discharge and redness.   Respiratory: Negative for cough.    Cardiovascular: Negative for chest pain and palpitations.        History of WPW with multiple ablative procedures.   Gastrointestinal: Negative for nausea and vomiting.   Musculoskeletal: Negative for back pain.   Skin: Negative for rash.   Neurological: Negative for sensory change, speech change and focal weakness.   Psychiatric/Behavioral: The patient is not nervous/anxious.           Objective:     /76   Pulse 72   Temp 36.6 °C (97.8 °F) (Temporal)   Resp 16   Ht 1.626 m (5' 4\")   Wt 76.7 kg (169 lb)   SpO2 96%   BMI 29.01 kg/m²      Physical Exam   Constitutional: She appears well-nourished. No distress.   HENT:   Right Ear: External ear normal.   Patient has tenderness over her maxillary sinuses bilaterally   Eyes: Conjunctivae are normal. Right eye exhibits no discharge.   Cardiovascular: Normal rate.    Pulmonary/Chest: Effort normal. No respiratory distress. She has no wheezes.   Abdominal: " She exhibits no distension.   Neurological: Coordination normal.   Skin: Skin is dry.   Psychiatric: She has a normal mood and affect.               Assessment/Plan:     1. Subacute maxillary sinusitis    2 S/P WPW ablations x 6     I am recommending the patient initiate/ continue hydration efforts including the use of a vaporizer/humidifier/ netti pot. I also recommend the pt, initiate Mucinex.. In addition the patient will initiate the prescribed prescription medication/s: Doxycyline . If the patient's condition exacerbates with worsening dysphagia, shortness of breath, uncontrolled fever, headache or chest pressure he/she will return immediately to the urgent care or go to  the emergency department for further evaluation.  Avoid decongestants because of her previous cardiac condition.  Ramu Berkowitz    - doxycycline (VIBRAMYCIN) 100 MG Tab; Take 1 Tab by mouth 2 times a day.  Dispense: 20 Tab; Refill: 0

## 2019-01-26 ASSESSMENT — ENCOUNTER SYMPTOMS
COUGH: 0
VOMITING: 0
SPEECH CHANGE: 0
SENSORY CHANGE: 0
NERVOUS/ANXIOUS: 0
EYE DISCHARGE: 0
PALPITATIONS: 0
FOCAL WEAKNESS: 0
NAUSEA: 0
EYE REDNESS: 0
FEVER: 0
BACK PAIN: 0

## 2019-02-01 ENCOUNTER — HOSPITAL ENCOUNTER (OUTPATIENT)
Dept: LAB | Facility: MEDICAL CENTER | Age: 39
End: 2019-02-01
Attending: OBSTETRICS & GYNECOLOGY
Payer: COMMERCIAL

## 2019-02-01 LAB — HCYS SERPL-SCNC: 10.6 UMOL/L

## 2019-02-01 PROCEDURE — 81241 F5 GENE: CPT

## 2019-02-01 PROCEDURE — 85598 HEXAGNAL PHOSPH PLTLT NEUTRL: CPT

## 2019-02-01 PROCEDURE — 83519 RIA NONANTIBODY: CPT

## 2019-02-01 PROCEDURE — 85291 CLOT FACTOR XIII FIBRIN SCRN: CPT

## 2019-02-01 PROCEDURE — 85300 ANTITHROMBIN III ACTIVITY: CPT

## 2019-02-01 PROCEDURE — 85613 RUSSELL VIPER VENOM DILUTED: CPT

## 2019-02-01 PROCEDURE — 83090 ASSAY OF HOMOCYSTEINE: CPT

## 2019-02-01 PROCEDURE — 85610 PROTHROMBIN TIME: CPT

## 2019-02-01 PROCEDURE — 36415 COLL VENOUS BLD VENIPUNCTURE: CPT

## 2019-02-01 PROCEDURE — 85303 CLOT INHIBIT PROT C ACTIVITY: CPT

## 2019-02-01 PROCEDURE — 85415 FIBRINOLYTIC PLASMINOGEN: CPT

## 2019-02-01 PROCEDURE — 86256 FLUORESCENT ANTIBODY TITER: CPT | Mod: 91

## 2019-02-01 PROCEDURE — 85306 CLOT INHIBIT PROT S FREE: CPT

## 2019-02-01 PROCEDURE — 86146 BETA-2 GLYCOPROTEIN ANTIBODY: CPT | Mod: 91

## 2019-02-01 PROCEDURE — 86255 FLUORESCENT ANTIBODY SCREEN: CPT | Mod: 91

## 2019-02-01 PROCEDURE — 85520 HEPARIN ASSAY: CPT

## 2019-02-01 PROCEDURE — 85730 THROMBOPLASTIN TIME PARTIAL: CPT

## 2019-02-01 PROCEDURE — 81240 F2 GENE: CPT

## 2019-02-01 PROCEDURE — 86147 CARDIOLIPIN ANTIBODY EA IG: CPT

## 2019-02-01 PROCEDURE — 86148 ANTI-PHOSPHOLIPID ANTIBODY: CPT

## 2019-02-05 LAB
AT III ACT/NOR PPP CHRO: 121 % (ref 76–128)
B2 GLYCOPROT1 IGA SER-ACNC: 1 SAU (ref 0–20)
B2 GLYCOPROT1 IGG SERPL IA-ACNC: 1 SGU (ref 0–20)
B2 GLYCOPROT1 IGM SERPL IA-ACNC: 3 SMU (ref 0–20)
CARDIOLIPIN IGA SER IA-ACNC: 1 APL (ref 0–11)
CARDIOLIPIN IGG SER IA-ACNC: 3 GPL (ref 0–14)
CARDIOLIPIN IGM SER IA-ACNC: 0 MPL (ref 0–12)
MISCELLANEOUS LAB RESULT MISCLAB: NORMAL
PROT S FREE AG ACT/NOR PPP IA: 71 % (ref 55–123)
PS IGA SER IA-ACNC: 1 U/ML (ref 0–19)
PS IGG SER IA-ACNC: 5 U/ML (ref 0–10)
PS IGM SER IA-ACNC: 25 U/ML (ref 0–24)

## 2019-02-06 LAB
APTT PPP: 29 SEC (ref 24.7–36)
F5 P.R506Q BLD/T QL: ABNORMAL
FACT XIII CLOT DIS 24H PPP QL: NORMAL
FACT XIII CLOT DIS 24H PPP QL: NORMAL
INR PPP: 1.11 (ref 0.87–1.13)
LA PPP-IMP: NORMAL
PROTHROMBIN TIME: 14.4 SEC (ref 12–14.6)
SCREEN DRVVT: 41.9 SEC (ref 28–48)
UFH PPP CHRO-ACNC: <0.1 U/ML

## 2019-02-07 LAB
F2 C.20210G>A GENO BLD/T: NEGATIVE
MISCELLANEOUS LAB RESULT MISCLAB: NORMAL

## 2019-02-07 PROCEDURE — 36415 COLL VENOUS BLD VENIPUNCTURE: CPT

## 2019-02-07 PROCEDURE — 85303 CLOT INHIBIT PROT C ACTIVITY: CPT

## 2019-02-11 LAB — PROT C ACT/NOR PPP: 111 % (ref 83–168)

## 2019-02-22 ENCOUNTER — HOSPITAL ENCOUNTER (OUTPATIENT)
Facility: MEDICAL CENTER | Age: 39
End: 2019-02-22
Attending: PHYSICIAN ASSISTANT
Payer: COMMERCIAL

## 2019-02-22 ENCOUNTER — OFFICE VISIT (OUTPATIENT)
Dept: URGENT CARE | Facility: PHYSICIAN GROUP | Age: 39
End: 2019-02-22
Payer: COMMERCIAL

## 2019-02-22 VITALS
TEMPERATURE: 97 F | OXYGEN SATURATION: 96 % | RESPIRATION RATE: 18 BRPM | HEART RATE: 71 BPM | SYSTOLIC BLOOD PRESSURE: 110 MMHG | DIASTOLIC BLOOD PRESSURE: 80 MMHG

## 2019-02-22 DIAGNOSIS — N30.01 ACUTE CYSTITIS WITH HEMATURIA: Primary | ICD-10-CM

## 2019-02-22 LAB
APPEARANCE UR: NORMAL
BILIRUB UR STRIP-MCNC: NEGATIVE MG/DL
COLOR UR AUTO: YELLOW
GLUCOSE UR STRIP.AUTO-MCNC: NEGATIVE MG/DL
KETONES UR STRIP.AUTO-MCNC: NEGATIVE MG/DL
LEUKOCYTE ESTERASE UR QL STRIP.AUTO: NEGATIVE
NITRITE UR QL STRIP.AUTO: NEGATIVE
PH UR STRIP.AUTO: 5.5 [PH] (ref 5–8)
PROT UR QL STRIP: NEGATIVE MG/DL
RBC UR QL AUTO: NEGATIVE
SP GR UR STRIP.AUTO: 1.02
UROBILINOGEN UR STRIP-MCNC: 0.2 MG/DL

## 2019-02-22 PROCEDURE — 99214 OFFICE O/P EST MOD 30 MIN: CPT | Performed by: PHYSICIAN ASSISTANT

## 2019-02-22 PROCEDURE — 87086 URINE CULTURE/COLONY COUNT: CPT

## 2019-02-22 PROCEDURE — 81002 URINALYSIS NONAUTO W/O SCOPE: CPT | Performed by: PHYSICIAN ASSISTANT

## 2019-02-22 RX ORDER — SULFAMETHOXAZOLE AND TRIMETHOPRIM 800; 160 MG/1; MG/1
1 TABLET ORAL 2 TIMES DAILY
Qty: 10 TAB | Refills: 0 | Status: SHIPPED | OUTPATIENT
Start: 2019-02-22 | End: 2019-02-27

## 2019-02-22 RX ORDER — PHENAZOPYRIDINE HYDROCHLORIDE 200 MG/1
200 TABLET, FILM COATED ORAL 3 TIMES DAILY
Qty: 6 TAB | Refills: 0 | Status: SHIPPED | OUTPATIENT
Start: 2019-02-22 | End: 2019-02-24

## 2019-02-22 ASSESSMENT — ENCOUNTER SYMPTOMS
NAUSEA: 0
SHORTNESS OF BREATH: 0
VOMITING: 0
ABDOMINAL PAIN: 0
COUGH: 0
CHILLS: 0
CONSTIPATION: 0
DIARRHEA: 0
FEVER: 0
FLANK PAIN: 0

## 2019-02-23 DIAGNOSIS — N30.01 ACUTE CYSTITIS WITH HEMATURIA: ICD-10-CM

## 2019-02-23 NOTE — PROGRESS NOTES
Subjective:   Heidi Gaspar is a 38 y.o. female who presents for UTI (x4 days)          Dysuria    This is a new problem. Episode onset: 4 days. The problem has been unchanged. The quality of the pain is described as burning. The patient is experiencing no pain. There has been no fever. There is no history of pyelonephritis. Associated symptoms include frequency, hematuria, hesitancy and urgency. Pertinent negatives include no chills, discharge, flank pain, nausea or vomiting. Her past medical history is significant for recurrent UTIs. There is no history of kidney stones or a single kidney.     Patient had previous ovarian torsion after a ovarian cyst rupture in September.  She is concerned because initially she felt like she had a UTI.  Patient is currently having dysuria, suprapubic pressure.  No fever, chills.  No nausea, vomiting.  No abdominal pain.  No previous history of kidney stone or kidney infection.    Review of Systems   Constitutional: Negative for chills, fever and malaise/fatigue.   Respiratory: Negative for cough and shortness of breath.    Gastrointestinal: Negative for abdominal pain, constipation, diarrhea, nausea and vomiting.   Genitourinary: Positive for dysuria, frequency, hematuria, hesitancy and urgency. Negative for flank pain.   All other systems reviewed and are negative.      PMH:  has a past medical history of Angina; ASTHMA; Bronchitis; Cardiac arrhythmia; Chickenpox; Influenza; Kidney, horseshoe; [place]'s syndrome; Other acquired deformity of toe; Personal history of venous thrombosis and embolism; Pneumonia; PSVT (paroxysmal supraventricular tachycardia) (Formerly [facility]); S/P ablation of ventricular arrhythmia; Ramirez-Parkinson-White syndrome; and WPW (Marisela-Parkinson-White syndrome). She also has no past medical history of Backpain; CATARACT; COPD; Diabetes; or Dialysis.  MEDS:   Current Outpatient Prescriptions:   •  sulfamethoxazole-trimethoprim (BACTRIM DS) 800-160 MG tablet, Take 1  Tab by mouth 2 times a day for 5 days., Disp: 10 Tab, Rfl: 0  •  phenazopyridine (PYRIDIUM) 200 MG Tab, Take 1 Tab by mouth 3 times a day for 2 days., Disp: 6 Tab, Rfl: 0  •  albuterol 108 (90 Base) MCG/ACT Aero Soln inhalation aerosol, Inhale 2 Puffs by mouth every 6 hours as needed for Shortness of Breath., Disp: 8.5 g, Rfl: 1  ALLERGIES:   Allergies   Allergen Reactions   • Inapsine [Droperidol] Anaphylaxis   • Keflex Rash and Swelling     RASH & SWELLING   • Morphine Unspecified     STOPS PEEING     • Penicillins Rash and Swelling     RASH & SWELLING     SURGHX:   Past Surgical History:   Procedure Laterality Date   • PELVISCOPY N/A 9/9/2018    Procedure: PELVISCOPY;  Surgeon: Dianne Martinez M.D.;  Location: SURGERY [facility];  Service: Gyn Robotic   • OOPHORECTOMY Left 9/9/2018    Procedure: SALPINGOOPHORECTOMY;  Surgeon: Dianne Martinez M.D.;  Location: SURGERY [facility];  Service: Gyn Robotic   • REPEAT C SECTION  6/18/2014    Performed by Corinne E Capurro, M.D. at LABOR AND DELIVERY   • GYN SURGERY  2007    csection   • OTHER  2003    L BREAST LUMPECTOMY   • BLANCA BY LAPAROSCOPY     • CHOLECYSTECTOMY     • IMPLANTABLE CARDIOVERTER DEFIBRILLATOR (ICD)     • OTHER      gallbladder removed   • OTHER      cardiac ablations x 6   • OTHER CARDIAC SURGERY      6 ablation   • PRIMARY C SECTION       SOCHX:  reports that she has never smoked. She has never used smokeless tobacco. She reports that she does not drink alcohol or use drugs.  Family History   Problem Relation Age of Onset   • Asthma Mother    • Lung Disease Mother         PE   • Other Mother         clotting disease   • Thyroid Mother    • Lung Disease Son         sees pediatric pulmonologist   • Asthma Son    • Diabetes Father    • Thyroid Sister    • Diabetes Maternal Aunt    • Other Maternal Grandfather         CHF   • Diabetes Paternal Aunt    • Diabetes Paternal Grandmother    • Psychiatry Paternal Grandmother         alzheimers         Objective:   /80   Pulse 71   Temp 36.1 °C (97 °F)   Resp 18   SpO2 96%     Physical Exam   Constitutional: She is oriented to person, place, and time. She appears well-developed and well-nourished. No distress.   HENT:   Head: Normocephalic and atraumatic.   Right Ear: Hearing, tympanic membrane and ear canal normal.   Left Ear: Hearing, tympanic membrane and ear canal normal.   Mouth/Throat: No posterior oropharyngeal edema or posterior oropharyngeal erythema.   Eyes: Pupils are equal, round, and reactive to light. Conjunctivae are normal.   Neck: Normal range of motion. Neck supple. No tracheal deviation present.   Cardiovascular: Normal rate and regular rhythm.    Pulmonary/Chest: Effort normal and breath sounds normal.   Abdominal: Soft. Normal appearance and bowel sounds are normal. There is tenderness in the suprapubic area. There is no rigidity, no rebound, no guarding and no CVA tenderness.   Neurological: She is alert and oriented to person, place, and time.   Skin: Skin is warm and dry. Capillary refill takes less than 2 seconds.   Psychiatric: She has a normal mood and affect. Her behavior is normal.   Vitals reviewed.        UA: negative     Assessment/Plan:     1. Acute cystitis with hematuria  POCT Urinalysis    Urine Culture    sulfamethoxazole-trimethoprim (BACTRIM DS) 800-160 MG tablet    phenazopyridine (PYRIDIUM) 200 MG Tab       Patient will be notified of final culture results.    She will be treated empirically with Bactrim, take full course of antibiotic. Supportive care measures reviewed, continue pushing fluids, probiotics/yogurt. UTI educational handout given to patient.  Given patient's past medical history a thorough discussion pt given strict ER precautions discussed.     Follow-up with primary care provider within 7-10 days, she can return to clinic at any time for reevaluation. Patient verbalized understanding of information.

## 2019-02-24 ENCOUNTER — HOSPITAL ENCOUNTER (EMERGENCY)
Facility: MEDICAL CENTER | Age: 39
End: 2019-02-25
Attending: EMERGENCY MEDICINE
Payer: COMMERCIAL

## 2019-02-24 ENCOUNTER — APPOINTMENT (OUTPATIENT)
Dept: RADIOLOGY | Facility: MEDICAL CENTER | Age: 39
End: 2019-02-24
Attending: EMERGENCY MEDICINE
Payer: COMMERCIAL

## 2019-02-24 DIAGNOSIS — R10.30 LOWER ABDOMINAL PAIN: ICD-10-CM

## 2019-02-24 LAB
ALBUMIN SERPL BCP-MCNC: 3.9 G/DL (ref 3.2–4.9)
ALBUMIN/GLOB SERPL: 1.6 G/DL
ALP SERPL-CCNC: 39 U/L (ref 30–99)
ALT SERPL-CCNC: 11 U/L (ref 2–50)
ANION GAP SERPL CALC-SCNC: 8 MMOL/L (ref 0–11.9)
APPEARANCE UR: CLEAR
AST SERPL-CCNC: 8 U/L (ref 12–45)
BACTERIA #/AREA URNS HPF: NEGATIVE /HPF
BASOPHILS # BLD AUTO: 0.6 % (ref 0–1.8)
BASOPHILS # BLD: 0.05 K/UL (ref 0–0.12)
BILIRUB SERPL-MCNC: 0.9 MG/DL (ref 0.1–1.5)
BILIRUB UR QL STRIP.AUTO: ABNORMAL
BUN SERPL-MCNC: 13 MG/DL (ref 8–22)
CALCIUM SERPL-MCNC: 8.8 MG/DL (ref 8.5–10.5)
CHLORIDE SERPL-SCNC: 107 MMOL/L (ref 96–112)
CO2 SERPL-SCNC: 20 MMOL/L (ref 20–33)
COLOR UR: ABNORMAL
CREAT SERPL-MCNC: 0.57 MG/DL (ref 0.5–1.4)
EOSINOPHIL # BLD AUTO: 0.27 K/UL (ref 0–0.51)
EOSINOPHIL NFR BLD: 3.1 % (ref 0–6.9)
EPI CELLS #/AREA URNS HPF: ABNORMAL /HPF
ERYTHROCYTE [DISTWIDTH] IN BLOOD BY AUTOMATED COUNT: 40.7 FL (ref 35.9–50)
GLOBULIN SER CALC-MCNC: 2.5 G/DL (ref 1.9–3.5)
GLUCOSE SERPL-MCNC: 86 MG/DL (ref 65–99)
GLUCOSE UR STRIP.AUTO-MCNC: NEGATIVE MG/DL
HCG SERPL QL: NEGATIVE
HCT VFR BLD AUTO: 38.7 % (ref 37–47)
HGB BLD-MCNC: 13.9 G/DL (ref 12–16)
HYALINE CASTS #/AREA URNS LPF: ABNORMAL /LPF
IMM GRANULOCYTES # BLD AUTO: 0.04 K/UL (ref 0–0.11)
IMM GRANULOCYTES NFR BLD AUTO: 0.5 % (ref 0–0.9)
KETONES UR STRIP.AUTO-MCNC: NEGATIVE MG/DL
LEUKOCYTE ESTERASE UR QL STRIP.AUTO: ABNORMAL
LIPASE SERPL-CCNC: 9 U/L (ref 11–82)
LYMPHOCYTES # BLD AUTO: 2.96 K/UL (ref 1–4.8)
LYMPHOCYTES NFR BLD: 33.7 % (ref 22–41)
MCH RBC QN AUTO: 30.5 PG (ref 27–33)
MCHC RBC AUTO-ENTMCNC: 35.9 G/DL (ref 33.6–35)
MCV RBC AUTO: 84.9 FL (ref 81.4–97.8)
MICRO URNS: ABNORMAL
MONOCYTES # BLD AUTO: 0.89 K/UL (ref 0–0.85)
MONOCYTES NFR BLD AUTO: 10.1 % (ref 0–13.4)
NEUTROPHILS # BLD AUTO: 4.57 K/UL (ref 2–7.15)
NEUTROPHILS NFR BLD: 52 % (ref 44–72)
NITRITE UR QL STRIP.AUTO: POSITIVE
NRBC # BLD AUTO: 0 K/UL
NRBC BLD-RTO: 0 /100 WBC
PH UR STRIP.AUTO: 5.5 [PH]
PLATELET # BLD AUTO: 230 K/UL (ref 164–446)
PMV BLD AUTO: 10.9 FL (ref 9–12.9)
POTASSIUM SERPL-SCNC: 3.7 MMOL/L (ref 3.6–5.5)
PROT SERPL-MCNC: 6.4 G/DL (ref 6–8.2)
PROT UR QL STRIP: NEGATIVE MG/DL
RBC # BLD AUTO: 4.56 M/UL (ref 4.2–5.4)
RBC # URNS HPF: ABNORMAL /HPF
RBC UR QL AUTO: NEGATIVE
SODIUM SERPL-SCNC: 135 MMOL/L (ref 135–145)
SP GR UR STRIP.AUTO: 1.01
UROBILINOGEN UR STRIP.AUTO-MCNC: 1 MG/DL
WBC # BLD AUTO: 8.8 K/UL (ref 4.8–10.8)
WBC #/AREA URNS HPF: ABNORMAL /HPF

## 2019-02-24 PROCEDURE — 85025 COMPLETE CBC W/AUTO DIFF WBC: CPT

## 2019-02-24 PROCEDURE — 36415 COLL VENOUS BLD VENIPUNCTURE: CPT

## 2019-02-24 PROCEDURE — 700117 HCHG RX CONTRAST REV CODE 255: Performed by: EMERGENCY MEDICINE

## 2019-02-24 PROCEDURE — 84703 CHORIONIC GONADOTROPIN ASSAY: CPT

## 2019-02-24 PROCEDURE — 80053 COMPREHEN METABOLIC PANEL: CPT

## 2019-02-24 PROCEDURE — 96375 TX/PRO/DX INJ NEW DRUG ADDON: CPT

## 2019-02-24 PROCEDURE — 81001 URINALYSIS AUTO W/SCOPE: CPT

## 2019-02-24 PROCEDURE — 99284 EMERGENCY DEPT VISIT MOD MDM: CPT

## 2019-02-24 PROCEDURE — 83690 ASSAY OF LIPASE: CPT

## 2019-02-24 PROCEDURE — 700111 HCHG RX REV CODE 636 W/ 250 OVERRIDE (IP): Performed by: EMERGENCY MEDICINE

## 2019-02-24 PROCEDURE — 74177 CT ABD & PELVIS W/CONTRAST: CPT

## 2019-02-24 PROCEDURE — 96374 THER/PROPH/DIAG INJ IV PUSH: CPT

## 2019-02-24 RX ORDER — ONDANSETRON 2 MG/ML
4 INJECTION INTRAMUSCULAR; INTRAVENOUS ONCE
Status: COMPLETED | OUTPATIENT
Start: 2019-02-24 | End: 2019-02-24

## 2019-02-24 RX ADMIN — FENTANYL CITRATE 100 MCG: 50 INJECTION, SOLUTION INTRAMUSCULAR; INTRAVENOUS at 22:09

## 2019-02-24 RX ADMIN — IOHEXOL 100 ML: 350 INJECTION, SOLUTION INTRAVENOUS at 23:13

## 2019-02-24 RX ADMIN — ONDANSETRON 4 MG: 2 INJECTION INTRAMUSCULAR; INTRAVENOUS at 22:09

## 2019-02-24 ASSESSMENT — PAIN DESCRIPTION - DESCRIPTORS: DESCRIPTORS: BURNING

## 2019-02-25 VITALS
WEIGHT: 179.68 LBS | HEART RATE: 77 BPM | DIASTOLIC BLOOD PRESSURE: 81 MMHG | HEIGHT: 64 IN | SYSTOLIC BLOOD PRESSURE: 140 MMHG | BODY MASS INDEX: 30.67 KG/M2 | OXYGEN SATURATION: 97 % | RESPIRATION RATE: 20 BRPM | TEMPERATURE: 98.4 F

## 2019-02-25 LAB
BACTERIA UR CULT: NORMAL
SIGNIFICANT IND 70042: NORMAL
SITE SITE: NORMAL
SOURCE SOURCE: NORMAL

## 2019-02-25 PROCEDURE — 76856 US EXAM PELVIC COMPLETE: CPT

## 2019-02-25 NOTE — ED TRIAGE NOTES
Pt ambulatory to triage c/o low abd pain/ right flank pain x 6 days. Diagnosed with UTI 2 days ago. + nausea, denies v/d or fever. Pt taking antibiotics and pyridium. Instructed on clean catch urine collection. VSS. Educated on triage process, encourage do inform staff of any changes.

## 2019-02-25 NOTE — ED PROVIDER NOTES
ED Provider Note    CHIEF COMPLAINT  Chief Complaint   Patient presents with   • Abdominal Pain   • Painful Urination       [facility]  Heidi Gaspar is a 38 y.o. female who presents with abdominal pain.  The patient states that she has been sick since last Tuesday.  The patient's had cramping suprapubic abdominal pain.  She was evaluated in the emergency department and diagnosed with a suspected urinary tract infection despite a normal urinalysis.  The patient's been taking Bactrim since that time.  The patient states she has had continued abdominal pain.  She does not currently have any dysuria nor she had any hematuria.  She does not have any irregular vaginal bleeding or discharge.  She has had anorexia.  She has not had any fevers.  She states she had ovarian torsion on the left in the past with similar pain however pain is more on the right at this time.    REVIEW OF SYSTEMS  See HPI for further details. All other systems are negative.     PAST MEDICAL HISTORY  Past Medical History:   Diagnosis Date   • Angina    • ASTHMA    • Bronchitis    • Cardiac arrhythmia    • Chickenpox    • Influenza    • Kidney, horseshoe    • [place]im[place]'s syndrome    • Other acquired deformity of toe    • Personal history of venous thrombosis and embolism     states when she was little she had problems clotting   • Pneumonia    • PSVT (paroxysmal supraventricular tachycardia) (Former[facility])    • S/P ablation of ventricular arrhythmia     x6 (most recent was 2006)   • Ramirez-Parkinson-White syndrome    • WPW (Marisela-Parkinson-White syndrome)        FAMILY HISTORY  [unfilled]    SOCIAL HISTORY  Social History     Social History   • Marital status:      Spouse name: N/A   • Number of children: N/A   • Years of education: N/A     Social History Main Topics   • Smoking status: Never Smoker   • Smokeless tobacco: Never Used   • Alcohol use No   • Drug use: No   • Sexual activity: Yes     Partners: Male     Birth control/ protection: Surgical     Other  "Topics Concern   • Not on file     Social History Narrative   • No narrative on file       SURGICAL HISTORY  Past Surgical History:   Procedure Laterality Date   • PELVISCOPY N/A 9/9/2018    Procedure: PELVISCOPY;  Surgeon: Dianne Martinez M.D.;  Location: SURGERY [facility];  Service: Gyn Robotic   • OOPHORECTOMY Left 9/9/2018    Procedure: SALPINGOOPHORECTOMY;  Surgeon: Dianne Martinez M.D.;  Location: SURGERY [facility];  Service: Gyn Robotic   • REPEAT C SECTION  6/18/2014    Performed by Corinne E Capurro, M.D. at LABOR AND DELIVERY   • GYN SURGERY  2007    csection   • OTHER  2003    L BREAST LUMPECTOMY   • BLANCA BY LAPAROSCOPY     • CHOLECYSTECTOMY     • IMPLANTABLE CARDIOVERTER DEFIBRILLATOR (ICD)     • OTHER      gallbladder removed   • OTHER      cardiac ablations x 6   • OTHER CARDIAC SURGERY      6 ablation   • PRIMARY C SECTION         CURRENT MEDICATIONS  Home Medications    **Home medications have not yet been reviewed for this encounter**         ALLERGIES  Allergies   Allergen Reactions   • Inapsine [Droperidol] Anaphylaxis   • Keflex Rash and Swelling     RASH & SWELLING   • Morphine Unspecified     STOPS PEEING     • Penicillins Rash and Swelling     RASH & SWELLING       PHYSICAL EXAM  VITAL SIGNS: /81   Pulse 78   Temp 36.9 °C (98.4 °F) (Temporal)   Resp 20   Ht 1.626 m (5' 4\")   Wt 81.5 kg (179 lb 10.8 oz)   LMP 02/03/2019   SpO2 98%   BMI 30.84 kg/m²       Constitutional: in acute distress, Non-toxic appearance.   HENT: Normocephalic, Atraumatic, Bilateral external ears normal, Oropharynx moist, No oral exudates, Nose normal.   Eyes: PERRLA, EOMI, Conjunctiva normal, No discharge.   Neck: Normal range of motion, No tenderness, Supple, No stridor.   Lymphatic: No lymphadenopathy noted.   Cardiovascular: Normal heart rate, Normal rhythm, No murmurs, No rubs, No gallops.   Thorax & Lungs: Normal breath sounds, No respiratory distress, No wheezing, No chest tenderness. "   Abdomen: Suprapubic and right lower quadrant discomfort to deep palpation, no guarding, no rebound, normal bowel sounds  Skin: Warm, Dry, No erythema, No rash.   Back: No tenderness, No CVA tenderness.   Extremities: Intact distal pulses, No edema, No tenderness, No cyanosis, No clubbing.   Musculoskeletal: Good range of motion in all major joints. No tenderness to palpation or major deformities noted.   Neurologic: Alert & oriented x 3, Normal motor function, Normal sensory function, No focal deficits noted.   Psychiatric: Affect normal, Judgment normal, Mood normal.         RADIOLOGY/PROCEDURES  US-PELVIC COMPLETE (TRANSABDOMINAL/TRANSVAGINAL) (COMBO)   Final Result         Unremarkable uterus and right ovary.      The left ovary is surgically absent.         CT-ABDOMEN-PELVIS WITH   Final Result         1. No significant interval change. No acute inflammatory change identified in the abdomen or pelvis.      2. Mildly dilated CBD and intrahepatic bile ducts could relate to post cholecystectomy status.            COURSE & MEDICAL DECISION MAKING  Pertinent Labs & Imaging studies reviewed. (See chart for details)  This a 38-year-old female who presents the emerge department with lower abdominal pain.  Initially she had some right lower quadrant tenderness on concern for possible appendicitis therefore a CT scan was performed.  The CT scan does not show any evidence of intra-abdominal inflammation.  Furthermore there is no evidence of obstructive process.  The patient continue have discomfort therefore an ultrasound was ordered to rule out an ovarian source as she has a history of left ovarian torsion with resection.  The ultrasound was normal as well.  Based on the laboratory analysis as well as the CT scan and ultrasound I do not have a clear source.  There is no evidence of pancreatitis, cholecystitis, appendicitis, nor an obstructive process.  She is currently taking antibiotics for urinary tract infection and  cystitis would still be in the differential.  She does have a follow-up appointment with her gynecologist this morning.  Therefore she will be discharged with instructions to keep that appointment.  She will return to the emerge department if she is acutely worse.    FINAL IMPRESSION  1.  Abdominal pain    Disposition  The patient will be discharged in stable condition         Electronically signed by: Leonardo Avalos, 2/24/2019 9:50 PM

## 2019-02-25 NOTE — ED NOTES
"piv estb. Labs drawn & sent. Flushed c 10cc ns. Pt tolerated well. Given meds per mar. currently pain 6/10. Mild relief c meds \"pressure in lower abd\". Family at bs. Call light in reach.   "

## 2019-03-04 ENCOUNTER — HOSPITAL ENCOUNTER (OUTPATIENT)
Dept: LAB | Facility: MEDICAL CENTER | Age: 39
End: 2019-03-04
Attending: NURSE PRACTITIONER
Payer: COMMERCIAL

## 2019-03-04 PROCEDURE — 87086 URINE CULTURE/COLONY COUNT: CPT

## 2019-03-31 ENCOUNTER — OFFICE VISIT (OUTPATIENT)
Dept: URGENT CARE | Facility: PHYSICIAN GROUP | Age: 39
End: 2019-03-31
Payer: COMMERCIAL

## 2019-03-31 VITALS
RESPIRATION RATE: 18 BRPM | HEART RATE: 71 BPM | HEIGHT: 64 IN | DIASTOLIC BLOOD PRESSURE: 78 MMHG | SYSTOLIC BLOOD PRESSURE: 120 MMHG | BODY MASS INDEX: 30.73 KG/M2 | TEMPERATURE: 98.1 F | OXYGEN SATURATION: 98 % | WEIGHT: 180 LBS

## 2019-03-31 DIAGNOSIS — J01.40 ACUTE PANSINUSITIS, RECURRENCE NOT SPECIFIED: ICD-10-CM

## 2019-03-31 PROCEDURE — 99214 OFFICE O/P EST MOD 30 MIN: CPT | Performed by: NURSE PRACTITIONER

## 2019-03-31 RX ORDER — METHYLPREDNISOLONE 4 MG/1
TABLET ORAL
Qty: 1 KIT | Refills: 0 | Status: SHIPPED | OUTPATIENT
Start: 2019-03-31 | End: 2019-09-23

## 2019-03-31 RX ORDER — DOXYCYCLINE HYCLATE 100 MG
100 TABLET ORAL 2 TIMES DAILY
Qty: 14 TAB | Refills: 0 | Status: SHIPPED | OUTPATIENT
Start: 2019-03-31 | End: 2019-04-07

## 2019-03-31 ASSESSMENT — ENCOUNTER SYMPTOMS
FEVER: 0
MYALGIAS: 0
SHORTNESS OF BREATH: 0
SPUTUM PRODUCTION: 0
NECK PAIN: 0
EYE REDNESS: 0
DIARRHEA: 0
WHEEZING: 0
SORE THROAT: 0
CHILLS: 0
CONSTIPATION: 0
EYE DISCHARGE: 0
WEAKNESS: 0
VOMITING: 0
COUGH: 0
HEADACHES: 1
ABDOMINAL PAIN: 0
DIZZINESS: 0
NAUSEA: 0
SINUS PAIN: 1

## 2019-03-31 NOTE — PROGRESS NOTES
Subjective:      Heidi Gaspar is a 38 y.o. female who presents with Sinusitis (month and a half); Otalgia (L ear drainage); and Dizziness (x 1.5 weeks ago)            HPI  C/o right ear pain with d/c, states green/yellow/blood d/c. Sinus problems x 1 month. Advil. Has flonase not using. No saline rinse. Frontal sinus HA. Ear pain keeps her up at night.    PMH:  has a past medical history of Angina; ASTHMA; Bronchitis; Cardiac arrhythmia; Chickenpox; Influenza; Kidney, horseshoe; [place]'s syndrome; Other acquired deformity of toe; Personal history of venous thrombosis and embolism; Pneumonia; PSVT (paroxysmal supraventricular tachycardia) (Formerly [facility]); S/P ablation of ventricular arrhythmia; Ramirez-Parkinson-White syndrome; and WPW (Marisela-Parkinson-White syndrome). She also has no past medical history of Backpain; CATARACT; COPD; Diabetes; or Dialysis.  MEDS:   Current Outpatient Prescriptions:   •  albuterol 108 (90 Base) MCG/ACT Aero Soln inhalation aerosol, Inhale 2 Puffs by mouth every 6 hours as needed for Shortness of Breath., Disp: 8.5 g, Rfl: 1  ALLERGIES:   Allergies   Allergen Reactions   • Inapsine [Droperidol] Anaphylaxis   • Keflex Rash and Swelling     RASH & SWELLING   • Morphine Unspecified     STOPS PEEING     • Penicillins Rash and Swelling     RASH & SWELLING     SURGHX:   Past Surgical History:   Procedure Laterality Date   • PELVISCOPY N/A 9/9/2018    Procedure: PELVISCOPY;  Surgeon: Dianne Martinez M.D.;  Location: SURGERY [facility];  Service: Gyn Robotic   • OOPHORECTOMY Left 9/9/2018    Procedure: SALPINGOOPHORECTOMY;  Surgeon: Dianne Martinez M.D.;  Location: SURGERY [facility];  Service: Gyn Robotic   • REPEAT C SECTION  6/18/2014    Performed by Corinne E Capurro, M.D. at LABOR AND DELIVERY   • GYN SURGERY  2007    csection   • OTHER  2003    L BREAST LUMPECTOMY   • BLANCA BY LAPAROSCOPY     • CHOLECYSTECTOMY     • OTHER      gallbladder removed   • OTHER      cardiac ablations x 6  "  • OTHER CARDIAC SURGERY      6 ablation   • PRIMARY C SECTION     • ZZZ IMPLANTABLE CARDIOVERTER DEFIBRILLATOR (ICD)       SOCHX:  reports that she has never smoked. She has never used smokeless tobacco. She reports that she does not drink alcohol or use drugs.  FH: Family history was reviewed, no pertinent findings to report    Review of Systems   Constitutional: Negative for chills, fever and malaise/fatigue.   HENT: Positive for congestion, ear discharge, ear pain and sinus pain. Negative for sore throat.    Eyes: Negative for discharge and redness.   Respiratory: Negative for cough, sputum production, shortness of breath and wheezing.    Gastrointestinal: Negative for abdominal pain, constipation, diarrhea, nausea and vomiting.   Musculoskeletal: Negative for myalgias and neck pain.   Skin: Negative for itching and rash.   Neurological: Positive for headaches. Negative for dizziness and weakness.   Endo/Heme/Allergies: Negative for environmental allergies.   All other systems reviewed and are negative.         Objective:     /78 (BP Location: Right arm, Patient Position: Sitting, BP Cuff Size: Adult)   Pulse 71   Temp 36.7 °C (98.1 °F) (Temporal)   Resp 18   Ht 1.626 m (5' 4.02\")   Wt 81.6 kg (180 lb)   SpO2 98%   BMI 30.88 kg/m²      Physical Exam   Constitutional: She is oriented to person, place, and time. Vital signs are normal. She appears well-developed and well-nourished. She is active and cooperative.  Non-toxic appearance. She does not have a sickly appearance. She does not appear ill. No distress.   HENT:   Head: Normocephalic.   Right Ear: External ear and ear canal normal. There is tenderness. No drainage or swelling. No mastoid tenderness. Tympanic membrane is not perforated, not erythematous and not bulging. A middle ear effusion is present.   Left Ear: External ear and ear canal normal. No drainage, swelling or tenderness. A middle ear effusion is present.   Nose: Mucosal edema and " rhinorrhea present.   Mouth/Throat: Uvula is midline. Mucous membranes are dry. No uvula swelling. Posterior oropharyngeal erythema present.   Eyes: Pupils are equal, round, and reactive to light. Conjunctivae and EOM are normal.   Neck: Normal range of motion. Neck supple.   Cardiovascular: Normal rate and regular rhythm.    Pulmonary/Chest: Effort normal and breath sounds normal. No accessory muscle usage. No respiratory distress. She has no decreased breath sounds. She has no wheezes. She has no rhonchi. She has no rales.   Musculoskeletal: Normal range of motion.   Lymphadenopathy:     She has no cervical adenopathy.   Neurological: She is alert and oriented to person, place, and time.   Skin: Skin is warm and dry. She is not diaphoretic.   Vitals reviewed.              Assessment/Plan:       1. Acute pansinusitis, recurrence not specified    - doxycycline (VIBRAMYCIN) 100 MG Tab; Take 1 Tab by mouth 2 times a day for 7 days.  Dispense: 14 Tab; Refill: 0  - MethylPREDNISolone (MEDROL DOSEPAK) 4 MG Tablet Therapy Pack; Use as directed  Dispense: 1 Kit; Refill: 0    Increase water intake  Get rest  May use Ibuprofen/Tylenol prn for any fever, body aches or throat pain  May take longer acting antihistamine for seasonal allergy symptoms prn  May use saline nasal spray for nasal congestion  May use Flonase or Nasocort for allergen nasal congestion  May gargle with salt water prn for throat discomfort  May drink smoothies for nutrition if too painful to swallow solid foods  Monitor for productive cough, SOB and chest pain/tightness- need re-evaluation

## 2019-05-06 ENCOUNTER — OFFICE VISIT (OUTPATIENT)
Dept: CARDIOLOGY | Facility: MEDICAL CENTER | Age: 39
End: 2019-05-06
Payer: COMMERCIAL

## 2019-05-06 VITALS
WEIGHT: 184 LBS | DIASTOLIC BLOOD PRESSURE: 50 MMHG | OXYGEN SATURATION: 99 % | BODY MASS INDEX: 31.41 KG/M2 | HEART RATE: 80 BPM | HEIGHT: 64 IN | SYSTOLIC BLOOD PRESSURE: 108 MMHG

## 2019-05-06 DIAGNOSIS — R00.2 PALPITATIONS: ICD-10-CM

## 2019-05-06 DIAGNOSIS — I45.6 WPW (WOLFF-PARKINSON-WHITE SYNDROME): ICD-10-CM

## 2019-05-06 DIAGNOSIS — I45.10 RIGHT BUNDLE BRANCH BLOCK: ICD-10-CM

## 2019-05-06 DIAGNOSIS — I44.5 LEFT POSTERIOR FASCICULAR BLOCK: ICD-10-CM

## 2019-05-06 DIAGNOSIS — R94.31 ABNORMAL EKG: ICD-10-CM

## 2019-05-06 DIAGNOSIS — Z98.890 S/P ABLATION OF ACCESSORY BYPASS TRACT: ICD-10-CM

## 2019-05-06 PROCEDURE — 99214 OFFICE O/P EST MOD 30 MIN: CPT | Performed by: INTERNAL MEDICINE

## 2019-05-06 ASSESSMENT — ENCOUNTER SYMPTOMS
SHORTNESS OF BREATH: 0
MYALGIAS: 0
LOSS OF CONSCIOUSNESS: 0
PALPITATIONS: 0
DIZZINESS: 0
COUGH: 0

## 2019-05-06 NOTE — PROGRESS NOTES
Chief Complaint   Patient presents with   • Palpitations       Subjective:   Heidi Plata is a 38 y.o. female who presents today for follow-up evaluation of conduction system disease, WPW, previous ablation x6, right bundle branch block, left posterior fascicular block.    Last seen on 11/12/2018.    Since 11/12/2018 the patient's had no further cardiac symptoms specifically no palpitations.  No syncope.  Has factor V Leiden marker.  No  Patient has significant history of WPW diagnosed at age 10, several x6 ablation procedures at [facility] last one in 2006 in [facility] by Dr. Samy Novak electrophysiologist including implantable loop recorder at that time.  Subsequently she had been seen by Dr. Manuel Munson and more recently followed by Dr. Pancho Howard at [facility] last seen 6 years ago.    The patient reports a one-year history of increasing significant episodes of daily palpitations becoming much worse waking her up at night and causing her to not feel well.  No syncope.    In 9/2018 the patient had an emergent oophorectomy for torsion.  On 11/8/2018 the patient was seen in the emergency room for some abdominal discomfort and palpitations.    This patient does not smoke cigarettes, drink alcohol or use recreational drugs.    Past Medical History:   Diagnosis Date   • Angina    • ASTHMA    • Bronchitis    • Cardiac arrhythmia    • Chickenpox    • Influenza    • Kidney, horseshoe    • [place]'s syndrome    • Other acquired deformity of toe    • Personal history of venous thrombosis and embolism     states when she was little she had problems clotting   • Pneumonia    • PSVT (paroxysmal supraventricular tachycardia) ([facility])    • S/P ablation of ventricular arrhythmia     x6 (most recent was 2006)   • Ramirez-Parkinson-White syndrome    • WPW (Marisela-Parkinson-White syndrome)      Past Surgical History:   Procedure Laterality Date   • PELVISCOPY N/A 9/9/2018    Procedure: PELVISCOPY;   Surgeon: Dianne Martinez M.D.;  Location: SURGERY [facility];  Service: Gyn Robotic   • OOPHORECTOMY Left 9/9/2018    Procedure: SALPINGOOPHORECTOMY;  Surgeon: Dianne Martinez M.D.;  Location: SURGERY [facility];  Service: Gyn Robotic   • REPEAT C SECTION  6/18/2014    Performed by Corinne E Capurro, M.D. at LABOR AND DELIVERY   • GYN SURGERY  2007    csection   • OTHER  2003    L BREAST LUMPECTOMY   • BLANCA BY LAPAROSCOPY     • CHOLECYSTECTOMY     • OTHER      gallbladder removed   • OTHER      cardiac ablations x 6   • OTHER CARDIAC SURGERY      6 ablation   • PRIMARY C SECTION     • ZZZ IMPLANTABLE CARDIOVERTER DEFIBRILLATOR (ICD)       Family History   Problem Relation Age of Onset   • Asthma Mother    • Lung Disease Mother         PE   • Other Mother         clotting disease   • Thyroid Mother    • Lung Disease Son         sees pediatric pulmonologist   • Asthma Son    • Diabetes Father    • Thyroid Sister    • Diabetes Maternal Aunt    • Other Maternal Grandfather         CHF   • Diabetes Paternal Aunt    • Diabetes Paternal Grandmother    • Psychiatry Paternal Grandmother         alzheimers     Social History     Social History   • Marital status:      Spouse name: N/A   • Number of children: N/A   • Years of education: N/A     Occupational History   • Not on file.     Social History Main Topics   • Smoking status: Never Smoker   • Smokeless tobacco: Never Used   • Alcohol use No   • Drug use: No   • Sexual activity: Yes     Partners: Male     Birth control/ protection: Surgical     Other Topics Concern   • Not on file     Social History Narrative   • No narrative on file     Allergies   Allergen Reactions   • Inapsine [Droperidol] Anaphylaxis   • Keflex Rash and Swelling     RASH & SWELLING   • Morphine Unspecified     STOPS PEEING     • Penicillins Rash and Swelling     RASH & SWELLING     Outpatient Encounter Prescriptions as of 5/6/2019   Medication Sig Dispense Refill   • albuterol 108  "(90 Base) MCG/ACT Aero Soln inhalation aerosol Inhale 2 Puffs by mouth every 6 hours as needed for Shortness of Breath. 8.5 g 1   • MethylPREDNISolone (MEDROL DOSEPAK) 4 MG Tablet Therapy Pack Use as directed (Patient not taking: Reported on 5/6/2019) 1 Kit 0     No facility-administered encounter medications on file as of 5/6/2019.      Review of Systems   Respiratory: Negative for cough and shortness of breath.    Cardiovascular: Negative for chest pain and palpitations.   Musculoskeletal: Negative for myalgias.   Neurological: Negative for dizziness and loss of consciousness.        Objective:   /50 (BP Location: Left arm, Patient Position: Sitting, BP Cuff Size: Adult)   Pulse 80   Ht 1.626 m (5' 4.02\")   Wt 83.5 kg (184 lb)   SpO2 99%   BMI 31.56 kg/m²     Physical Exam   Constitutional: She is oriented to person, place, and time. She appears well-developed and well-nourished.   Eyes: Pupils are equal, round, and reactive to light. Conjunctivae are normal.   Neck: Normal range of motion. Neck supple. No JVD present.   Cardiovascular: Normal rate, regular rhythm, normal heart sounds and intact distal pulses.    Pulmonary/Chest: Effort normal and breath sounds normal. No accessory muscle usage. No respiratory distress. She has no wheezes. She has no rales.   Musculoskeletal: She exhibits no edema.   Neurological: She is alert and oriented to person, place, and time.   Skin: Skin is warm, dry and intact. No rash noted. No cyanosis. Nails show no clubbing.   Psychiatric: She has a normal mood and affect. Her behavior is normal.     EKG 11/08/2018 normal sinus rhythm, rate 77.  Left anterior hemiblock.  Right bundle branch block.  Reviewed by myself.    HOLTER 11/26/2018  1.  Normal sinus rhythm, average heart rate 68 bpm.   2.  Rare premature atrial complexes.   3.  Rare premature ventricular complexes.   4.  Diary not returned.    ECHOCARDIOGRAM 11/26/2018  Normal left ventricular size and systolic " function.  Left ventricular ejection fraction is visually estimated to be 65%.  Mild mitral regurgitation.  Right heart pressures are normal.  No prior study is available for comparison.    Assessment:     1. WPW (Marisela-Parkinson-White syndrome)     2. S/P ablation of accessory bypass tract     3. Palpitations     4. Abnormal EKG     5. Left posterior fascicular block     6. Right bundle branch block         Medical Decision Making:  Today's Assessment / Status / Plan:     Recommendation Discussion  1.  The patient's current cardiovascular status is stable.  2.  No additional cardiac recommendations.  3.  Follow-up 1 year, sooner if necessary.

## 2019-06-24 ENCOUNTER — OFFICE VISIT (OUTPATIENT)
Dept: URGENT CARE | Facility: PHYSICIAN GROUP | Age: 39
End: 2019-06-24
Payer: COMMERCIAL

## 2019-06-24 VITALS
OXYGEN SATURATION: 96 % | SYSTOLIC BLOOD PRESSURE: 100 MMHG | WEIGHT: 185 LBS | DIASTOLIC BLOOD PRESSURE: 56 MMHG | HEIGHT: 64 IN | TEMPERATURE: 98.6 F | HEART RATE: 77 BPM | BODY MASS INDEX: 31.58 KG/M2

## 2019-06-24 DIAGNOSIS — J01.00 ACUTE NON-RECURRENT MAXILLARY SINUSITIS: ICD-10-CM

## 2019-06-24 PROCEDURE — 99214 OFFICE O/P EST MOD 30 MIN: CPT | Performed by: PHYSICIAN ASSISTANT

## 2019-06-24 RX ORDER — DOXYCYCLINE HYCLATE 100 MG
100 TABLET ORAL 2 TIMES DAILY
Qty: 20 TAB | Refills: 0 | Status: SHIPPED | OUTPATIENT
Start: 2019-06-24 | End: 2019-09-23

## 2019-06-24 RX ORDER — FLUTICASONE PROPIONATE 50 MCG
1 SPRAY, SUSPENSION (ML) NASAL DAILY
Qty: 16 G | Refills: 0 | Status: SHIPPED | OUTPATIENT
Start: 2019-06-24 | End: 2019-09-23

## 2019-06-24 ASSESSMENT — ENCOUNTER SYMPTOMS
SHORTNESS OF BREATH: 0
DIZZINESS: 0
FEVER: 0
NECK PAIN: 0
SINUS PRESSURE: 1
CARDIOVASCULAR NEGATIVE: 1
GASTROINTESTINAL NEGATIVE: 1
SORE THROAT: 1
CHILLS: 1
SWOLLEN GLANDS: 1
HEADACHES: 1
COUGH: 0
MYALGIAS: 0
WHEEZING: 0

## 2019-06-24 NOTE — PROGRESS NOTES
Subjective:      Heidi Gaspar is a 39 y.o. female who presents with Sinus Problem (Rt side of face hurts, Rt ear pain, sinus pressure x 2 wks )            Sinus Problem   This is a new problem. The current episode started 1 to 4 weeks ago. The problem is unchanged. There has been no fever. The fever has been present for less than 1 day. Associated symptoms include chills, congestion, ear pain, headaches, sinus pressure, a sore throat and swollen glands. Pertinent negatives include no coughing, neck pain or shortness of breath. Past treatments include oral decongestants. The treatment provided mild relief.       PMH:  has a past medical history of Angina; ASTHMA; Bronchitis; Cardiac arrhythmia; Chickenpox; Influenza; Kidney, horseshoe; [place]'s syndrome; Other acquired deformity of toe; Personal history of venous thrombosis and embolism; Pneumonia; PSVT (paroxysmal supraventricular tachycardia) (HCC); S/P ablation of ventricular arrhythmia; Ramirez-Parkinson-White syndrome; and WPW (Marisela-Parkinson-White syndrome). She also has no past medical history of Backpain; CATARACT; COPD; Diabetes; or Dialysis.  MEDS:   Current Outpatient Prescriptions:   •  doxycycline (VIBRAMYCIN) 100 MG Tab, Take 1 Tab by mouth 2 times a day., Disp: 20 Tab, Rfl: 0  •  fluticasone (FLONASE) 50 MCG/ACT nasal spray, Spray 1 Spray in nose every day., Disp: 16 g, Rfl: 0  •  MethylPREDNISolone (MEDROL DOSEPAK) 4 MG Tablet Therapy Pack, Use as directed (Patient not taking: Reported on 5/6/2019), Disp: 1 Kit, Rfl: 0  •  albuterol 108 (90 Base) MCG/ACT Aero Soln inhalation aerosol, Inhale 2 Puffs by mouth every 6 hours as needed for Shortness of Breath. (Patient not taking: Reported on 6/24/2019), Disp: 8.5 g, Rfl: 1  ALLERGIES:   Allergies   Allergen Reactions   • Inapsine [Droperidol] Anaphylaxis   • Keflex Rash and Swelling     RASH & SWELLING   • Morphine Unspecified     STOPS PEEING     • Penicillins Rash and Swelling     RASH & SWELLING  "    SURGHX:   Past Surgical History:   Procedure Laterality Date   • PELVISCOPY N/A 9/9/2018    Procedure: PELVISCOPY;  Surgeon: Dianne Martinez M.D.;  Location: SURGERY [facility];  Service: Gyn Robotic   • OOPHORECTOMY Left 9/9/2018    Procedure: SALPINGOOPHORECTOMY;  Surgeon: Dianne Martinez M.D.;  Location: SURGERY [facility];  Service: Gyn Robotic   • REPEAT C SECTION  6/18/2014    Performed by Corinne E Capurro, M.D. at LABOR AND DELIVERY   • GYN SURGERY  2007    csection   • OTHER  2003    L BREAST LUMPECTOMY   • BLANCA BY LAPAROSCOPY     • CHOLECYSTECTOMY     • OTHER      gallbladder removed   • OTHER      cardiac ablations x 6   • OTHER CARDIAC SURGERY      6 ablation   • PRIMARY C SECTION     • ZZZ IMPLANTABLE CARDIOVERTER DEFIBRILLATOR (ICD)       SOCHX:  reports that she has never smoked. She has never used smokeless tobacco. She reports that she does not drink alcohol or use drugs.  FH: family history includes Asthma in her mother and son; Diabetes in her father, maternal aunt, paternal aunt, and paternal grandmother; Lung Disease in her mother and son; Other in her maternal grandfather and mother; Psychiatry in her paternal grandmother; Thyroid in her mother and sister.      Review of Systems   Constitutional: Positive for chills. Negative for fever.   HENT: Positive for congestion, ear pain, sinus pressure and sore throat.    Respiratory: Negative for cough, shortness of breath and wheezing.    Cardiovascular: Negative.    Gastrointestinal: Negative.    Musculoskeletal: Negative for myalgias and neck pain.   Neurological: Positive for headaches. Negative for dizziness.       Medications, Allergies, and current problem list reviewed today in Epic     Objective:     /56 (BP Location: Left arm, Patient Position: Sitting, BP Cuff Size: Adult)   Pulse 77   Temp 37 °C (98.6 °F) (Temporal)   Ht 1.626 m (5' 4\")   Wt 83.9 kg (185 lb)   SpO2 96%   BMI 31.76 kg/m²      Physical Exam "   Constitutional: She is oriented to person, place, and time. She appears well-developed and well-nourished. No distress.   HENT:   Head: Normocephalic and atraumatic.   Right Ear: Hearing, tympanic membrane, external ear and ear canal normal. Tympanic membrane is not erythematous. No decreased hearing is noted.   Left Ear: Hearing, tympanic membrane, external ear and ear canal normal. Tympanic membrane is not erythematous. No decreased hearing is noted.   Nose: Right sinus exhibits maxillary sinus tenderness. Left sinus exhibits maxillary sinus tenderness.   Mouth/Throat: Normal dentition. Posterior oropharyngeal erythema present. No oropharyngeal exudate.   Eyes: Conjunctivae are normal. Right eye exhibits no discharge. Left eye exhibits no discharge. No scleral icterus.   Neck: Normal range of motion. Neck supple.   Cardiovascular: Normal rate, regular rhythm and normal heart sounds.    No murmur heard.  Pulmonary/Chest: Effort normal and breath sounds normal. No respiratory distress. She has no wheezes. She has no rales.   Lymphadenopathy:        Head (right side): Submandibular adenopathy present.        Head (left side): Submandibular adenopathy present.     She has no cervical adenopathy.        Right cervical: No superficial cervical adenopathy present.       Left cervical: No superficial cervical adenopathy present.   Neurological: She is alert and oriented to person, place, and time.   Skin: Skin is warm, dry and intact. She is not diaphoretic. No cyanosis. Nails show no clubbing.   Psychiatric: She has a normal mood and affect. Her behavior is normal. Judgment and thought content normal.   Nursing note and vitals reviewed.              Assessment/Plan:     1. Acute non-recurrent maxillary sinusitis  doxycycline (VIBRAMYCIN) 100 MG Tab    fluticasone (FLONASE) 50 MCG/ACT nasal spray     Take full course of antibiotic  Tylenol and Motrin for fever and pain  OTC meds as discussed including oral decongestant  if tolerated  Increase fluids and rest  Nasal spray, nasal wash, Michelle pot    Return to clinic or go to ED if symptoms worsen or persist. Indications for ED discussed at length. Patient voices understanding. Follow-up with your primary care provider in 3-5 days. Red flags discussed. All side effects of medication discussed including allergic response, GI upset, tendon injury, etc.    Please note that this dictation was created using voice recognition software. I have made every reasonable attempt to correct obvious errors, but I expect that there are errors of grammar and possibly content that I did not discover before finalizing the note.

## 2019-09-23 ENCOUNTER — HOSPITAL ENCOUNTER (OUTPATIENT)
Facility: MEDICAL CENTER | Age: 39
End: 2019-09-24
Attending: EMERGENCY MEDICINE | Admitting: FAMILY MEDICINE
Payer: COMMERCIAL

## 2019-09-23 ENCOUNTER — APPOINTMENT (OUTPATIENT)
Dept: RADIOLOGY | Facility: MEDICAL CENTER | Age: 39
End: 2019-09-23
Attending: EMERGENCY MEDICINE
Payer: COMMERCIAL

## 2019-09-23 DIAGNOSIS — R55 NEAR SYNCOPE: ICD-10-CM

## 2019-09-23 DIAGNOSIS — R00.2 PALPITATIONS: ICD-10-CM

## 2019-09-23 PROBLEM — R07.9 CHEST PAIN: Status: ACTIVE | Noted: 2019-09-23

## 2019-09-23 PROBLEM — E83.42 HYPOMAGNESEMIA: Status: ACTIVE | Noted: 2019-09-23

## 2019-09-23 PROBLEM — Q87.19 NOONAN SYNDROME: Status: ACTIVE | Noted: 2019-09-23

## 2019-09-23 PROBLEM — D68.51 HETEROZYGOUS FACTOR V LEIDEN MUTATION (HCC): Status: ACTIVE | Noted: 2019-09-23

## 2019-09-23 LAB
ALBUMIN SERPL BCP-MCNC: 4.6 G/DL (ref 3.2–4.9)
ALBUMIN/GLOB SERPL: 2.1 G/DL
ALP SERPL-CCNC: 50 U/L (ref 30–99)
ALT SERPL-CCNC: 15 U/L (ref 2–50)
ANION GAP SERPL CALC-SCNC: 9 MMOL/L (ref 0–11.9)
AST SERPL-CCNC: 13 U/L (ref 12–45)
BASOPHILS # BLD AUTO: 0.6 % (ref 0–1.8)
BASOPHILS # BLD: 0.07 K/UL (ref 0–0.12)
BILIRUB SERPL-MCNC: 1.2 MG/DL (ref 0.1–1.5)
BUN SERPL-MCNC: 11 MG/DL (ref 8–22)
CALCIUM SERPL-MCNC: 9.7 MG/DL (ref 8.5–10.5)
CHLORIDE SERPL-SCNC: 106 MMOL/L (ref 96–112)
CO2 SERPL-SCNC: 23 MMOL/L (ref 20–33)
CREAT SERPL-MCNC: 0.5 MG/DL (ref 0.5–1.4)
D DIMER PPP IA.FEU-MCNC: <0.4 UG/ML (FEU) (ref 0–0.5)
EKG IMPRESSION: NORMAL
EKG IMPRESSION: NORMAL
EOSINOPHIL # BLD AUTO: 0.2 K/UL (ref 0–0.51)
EOSINOPHIL NFR BLD: 1.6 % (ref 0–6.9)
ERYTHROCYTE [DISTWIDTH] IN BLOOD BY AUTOMATED COUNT: 41.8 FL (ref 35.9–50)
GLOBULIN SER CALC-MCNC: 2.2 G/DL (ref 1.9–3.5)
GLUCOSE SERPL-MCNC: 83 MG/DL (ref 65–99)
HCG SERPL QL: NEGATIVE
HCT VFR BLD AUTO: 42.8 % (ref 37–47)
HGB BLD-MCNC: 14.9 G/DL (ref 12–16)
IMM GRANULOCYTES # BLD AUTO: 0.14 K/UL (ref 0–0.11)
IMM GRANULOCYTES NFR BLD AUTO: 1.1 % (ref 0–0.9)
LYMPHOCYTES # BLD AUTO: 3.16 K/UL (ref 1–4.8)
LYMPHOCYTES NFR BLD: 24.8 % (ref 22–41)
MAGNESIUM SERPL-MCNC: 1.8 MG/DL (ref 1.5–2.5)
MCH RBC QN AUTO: 29.7 PG (ref 27–33)
MCHC RBC AUTO-ENTMCNC: 34.8 G/DL (ref 33.6–35)
MCV RBC AUTO: 85.3 FL (ref 81.4–97.8)
MONOCYTES # BLD AUTO: 0.99 K/UL (ref 0–0.85)
MONOCYTES NFR BLD AUTO: 7.8 % (ref 0–13.4)
NEUTROPHILS # BLD AUTO: 8.16 K/UL (ref 2–7.15)
NEUTROPHILS NFR BLD: 64.1 % (ref 44–72)
NRBC # BLD AUTO: 0 K/UL
NRBC BLD-RTO: 0 /100 WBC
PHOSPHATE SERPL-MCNC: 3.4 MG/DL (ref 2.5–4.5)
PLATELET # BLD AUTO: 278 K/UL (ref 164–446)
PMV BLD AUTO: 11.2 FL (ref 9–12.9)
POTASSIUM SERPL-SCNC: 3.9 MMOL/L (ref 3.6–5.5)
PROT SERPL-MCNC: 6.8 G/DL (ref 6–8.2)
RBC # BLD AUTO: 5.02 M/UL (ref 4.2–5.4)
SODIUM SERPL-SCNC: 138 MMOL/L (ref 135–145)
TROPONIN T SERPL-MCNC: <6 NG/L (ref 6–19)
TROPONIN T SERPL-MCNC: <6 NG/L (ref 6–19)
TSH SERPL DL<=0.005 MIU/L-ACNC: 1.3 UIU/ML (ref 0.38–5.33)
WBC # BLD AUTO: 12.7 K/UL (ref 4.8–10.8)

## 2019-09-23 PROCEDURE — 36415 COLL VENOUS BLD VENIPUNCTURE: CPT

## 2019-09-23 PROCEDURE — 93005 ELECTROCARDIOGRAM TRACING: CPT | Performed by: EMERGENCY MEDICINE

## 2019-09-23 PROCEDURE — 93005 ELECTROCARDIOGRAM TRACING: CPT

## 2019-09-23 PROCEDURE — 96366 THER/PROPH/DIAG IV INF ADDON: CPT

## 2019-09-23 PROCEDURE — 70498 CT ANGIOGRAPHY NECK: CPT

## 2019-09-23 PROCEDURE — 99245 OFF/OP CONSLTJ NEW/EST HI 55: CPT | Performed by: INTERNAL MEDICINE

## 2019-09-23 PROCEDURE — 71045 X-RAY EXAM CHEST 1 VIEW: CPT

## 2019-09-23 PROCEDURE — G0378 HOSPITAL OBSERVATION PER HR: HCPCS

## 2019-09-23 PROCEDURE — 84100 ASSAY OF PHOSPHORUS: CPT

## 2019-09-23 PROCEDURE — 85025 COMPLETE CBC W/AUTO DIFF WBC: CPT

## 2019-09-23 PROCEDURE — 80053 COMPREHEN METABOLIC PANEL: CPT

## 2019-09-23 PROCEDURE — 83735 ASSAY OF MAGNESIUM: CPT

## 2019-09-23 PROCEDURE — 93010 ELECTROCARDIOGRAM REPORT: CPT | Performed by: INTERNAL MEDICINE

## 2019-09-23 PROCEDURE — 84484 ASSAY OF TROPONIN QUANT: CPT

## 2019-09-23 PROCEDURE — 99285 EMERGENCY DEPT VISIT HI MDM: CPT

## 2019-09-23 PROCEDURE — 84443 ASSAY THYROID STIM HORMONE: CPT

## 2019-09-23 PROCEDURE — 93005 ELECTROCARDIOGRAM TRACING: CPT | Performed by: FAMILY MEDICINE

## 2019-09-23 PROCEDURE — 700111 HCHG RX REV CODE 636 W/ 250 OVERRIDE (IP): Performed by: FAMILY MEDICINE

## 2019-09-23 PROCEDURE — 99220 PR INITIAL OBSERVATION CARE,LEVL III: CPT | Performed by: FAMILY MEDICINE

## 2019-09-23 PROCEDURE — 85379 FIBRIN DEGRADATION QUANT: CPT

## 2019-09-23 PROCEDURE — 700105 HCHG RX REV CODE 258: Performed by: FAMILY MEDICINE

## 2019-09-23 PROCEDURE — 84703 CHORIONIC GONADOTROPIN ASSAY: CPT

## 2019-09-23 PROCEDURE — 96365 THER/PROPH/DIAG IV INF INIT: CPT

## 2019-09-23 RX ORDER — ASPIRIN 81 MG/1
324 TABLET, CHEWABLE ORAL DAILY
Status: DISCONTINUED | OUTPATIENT
Start: 2019-09-24 | End: 2019-09-24 | Stop reason: HOSPADM

## 2019-09-23 RX ORDER — SODIUM CHLORIDE 9 MG/ML
INJECTION, SOLUTION INTRAVENOUS CONTINUOUS
Status: DISCONTINUED | OUTPATIENT
Start: 2019-09-23 | End: 2019-09-24 | Stop reason: HOSPADM

## 2019-09-23 RX ORDER — MAGNESIUM SULFATE HEPTAHYDRATE 40 MG/ML
2 INJECTION, SOLUTION INTRAVENOUS ONCE
Status: COMPLETED | OUTPATIENT
Start: 2019-09-23 | End: 2019-09-24

## 2019-09-23 RX ORDER — PROMETHAZINE HYDROCHLORIDE 25 MG/1
12.5-25 TABLET ORAL EVERY 4 HOURS PRN
Status: DISCONTINUED | OUTPATIENT
Start: 2019-09-23 | End: 2019-09-24 | Stop reason: HOSPADM

## 2019-09-23 RX ORDER — BISACODYL 10 MG
10 SUPPOSITORY, RECTAL RECTAL
Status: DISCONTINUED | OUTPATIENT
Start: 2019-09-23 | End: 2019-09-24 | Stop reason: HOSPADM

## 2019-09-23 RX ORDER — ASPIRIN 300 MG/1
300 SUPPOSITORY RECTAL DAILY
Status: DISCONTINUED | OUTPATIENT
Start: 2019-09-24 | End: 2019-09-24 | Stop reason: HOSPADM

## 2019-09-23 RX ORDER — OXYCODONE HYDROCHLORIDE 5 MG/1
5-10 TABLET ORAL EVERY 4 HOURS PRN
Status: DISCONTINUED | OUTPATIENT
Start: 2019-09-23 | End: 2019-09-24 | Stop reason: HOSPADM

## 2019-09-23 RX ORDER — PROCHLORPERAZINE EDISYLATE 5 MG/ML
5-10 INJECTION INTRAMUSCULAR; INTRAVENOUS EVERY 4 HOURS PRN
Status: DISCONTINUED | OUTPATIENT
Start: 2019-09-23 | End: 2019-09-24 | Stop reason: HOSPADM

## 2019-09-23 RX ORDER — ASPIRIN 325 MG
325 TABLET ORAL DAILY
Status: DISCONTINUED | OUTPATIENT
Start: 2019-09-24 | End: 2019-09-24 | Stop reason: HOSPADM

## 2019-09-23 RX ORDER — ACETAMINOPHEN 325 MG/1
650 TABLET ORAL EVERY 6 HOURS PRN
Status: DISCONTINUED | OUTPATIENT
Start: 2019-09-23 | End: 2019-09-24 | Stop reason: HOSPADM

## 2019-09-23 RX ORDER — AMOXICILLIN 250 MG
2 CAPSULE ORAL 2 TIMES DAILY
Status: DISCONTINUED | OUTPATIENT
Start: 2019-09-23 | End: 2019-09-24 | Stop reason: HOSPADM

## 2019-09-23 RX ORDER — POLYETHYLENE GLYCOL 3350 17 G/17G
1 POWDER, FOR SOLUTION ORAL
Status: DISCONTINUED | OUTPATIENT
Start: 2019-09-23 | End: 2019-09-24 | Stop reason: HOSPADM

## 2019-09-23 RX ORDER — IBUPROFEN 200 MG
600 TABLET ORAL EVERY 8 HOURS PRN
COMMUNITY
End: 2020-02-11

## 2019-09-23 RX ORDER — PROMETHAZINE HYDROCHLORIDE 25 MG/1
12.5-25 SUPPOSITORY RECTAL EVERY 4 HOURS PRN
Status: DISCONTINUED | OUTPATIENT
Start: 2019-09-23 | End: 2019-09-24 | Stop reason: HOSPADM

## 2019-09-23 RX ADMIN — SODIUM CHLORIDE: 9 INJECTION, SOLUTION INTRAVENOUS at 19:55

## 2019-09-23 RX ADMIN — MAGNESIUM SULFATE 2 G: 2 INJECTION INTRAVENOUS at 22:08

## 2019-09-23 ASSESSMENT — LIFESTYLE VARIABLES
CONSUMPTION TOTAL: INCOMPLETE
HAVE YOU EVER FELT YOU SHOULD CUT DOWN ON YOUR DRINKING: NO
HAVE PEOPLE ANNOYED YOU BY CRITICIZING YOUR DRINKING: NO
EVER FELT BAD OR GUILTY ABOUT YOUR DRINKING: NO
DOES PATIENT WANT TO STOP DRINKING: NO
HAVE YOU EVER FELT YOU SHOULD CUT DOWN ON YOUR DRINKING: NO
TOTAL SCORE: 0
TOTAL SCORE: 0
HOW MANY TIMES IN THE PAST YEAR HAVE YOU HAD 5 OR MORE DRINKS IN A DAY: 0
ON A TYPICAL DAY WHEN YOU DRINK ALCOHOL HOW MANY DRINKS DO YOU HAVE: 0
HAVE PEOPLE ANNOYED YOU BY CRITICIZING YOUR DRINKING: NO
DOES PATIENT WANT TO STOP DRINKING: NO
ALCOHOL_USE: NO
EVER HAD A DRINK FIRST THING IN THE MORNING TO STEADY YOUR NERVES TO GET RID OF A HANGOVER: NO
AVERAGE NUMBER OF DAYS PER WEEK YOU HAVE A DRINK CONTAINING ALCOHOL: 0
ALCOHOL_USE: NO
CONSUMPTION TOTAL: INCOMPLETE
TOTAL SCORE: 0
HOW MANY TIMES IN THE PAST YEAR HAVE YOU HAD 5 OR MORE DRINKS IN A DAY: 0
EVER_SMOKED: NEVER
EVER FELT BAD OR GUILTY ABOUT YOUR DRINKING: NO
AVERAGE NUMBER OF DAYS PER WEEK YOU HAVE A DRINK CONTAINING ALCOHOL: 0

## 2019-09-23 ASSESSMENT — ENCOUNTER SYMPTOMS
NERVOUS/ANXIOUS: 1
SENSORY CHANGE: 0
FOCAL WEAKNESS: 0
WEIGHT GAIN: 0
CONSTIPATION: 0
HEADACHES: 0
HEARTBURN: 0
BACK PAIN: 0
ORTHOPNEA: 0
IRREGULAR HEARTBEAT: 0
WEIGHT LOSS: 0
BLURRED VISION: 0
DIZZINESS: 0
PALPITATIONS: 1
SHORTNESS OF BREATH: 1
SHORTNESS OF BREATH: 0
ALTERED MENTAL STATUS: 0
SORE THROAT: 0
DECREASED APPETITE: 0
NAUSEA: 0
DYSPNEA ON EXERTION: 0
DEPRESSION: 0
ABDOMINAL PAIN: 0
SPEECH CHANGE: 0
PND: 0
WEAKNESS: 0
NECK PAIN: 0
FLANK PAIN: 0
CHILLS: 0
CLAUDICATION: 0
DIAPHORESIS: 0
VOMITING: 0
WHEEZING: 0
SYNCOPE: 0
DIARRHEA: 0
FEVER: 0
NEAR-SYNCOPE: 0
COUGH: 0

## 2019-09-23 ASSESSMENT — PATIENT HEALTH QUESTIONNAIRE - PHQ9
SUM OF ALL RESPONSES TO PHQ9 QUESTIONS 1 AND 2: 0
1. LITTLE INTEREST OR PLEASURE IN DOING THINGS: NOT AT ALL
2. FEELING DOWN, DEPRESSED, IRRITABLE, OR HOPELESS: NOT AT ALL

## 2019-09-23 NOTE — ED TRIAGE NOTES
"PT ambulated to triage c/o chest pressure and a feeling of neck fullness x 4-5 days.  PT reports she has WPW and has had 5 cardiac ablations in the past.      Labs ordered     Chief Complaint   Patient presents with   • Palpitations   • Chest Pressure     x 4-5 nights      /90   Pulse 78   Temp 36.4 °C (97.5 °F) (Temporal)   Resp 16   Ht 1.626 m (5' 4\")   Wt 85.1 kg (187 lb 9.8 oz)   SpO2 98%     "

## 2019-09-23 NOTE — ED PROVIDER NOTES
"ED Provider Note    CHIEF COMPLAINT  Chief Complaint   Patient presents with   • Palpitations   • Chest Pressure     x 4-5 nights        HPI  Heidi Gaspar is a 39 y.o. female with a history of WPW status post ablation in 2006 and factor V Leiden who presents complaining of palpitations, mainly in her neck.    Patient states she is aware of a pulsatile feeling more significantly at night for the last 4 to 5 days.  She feels that the \"arteries in my neck are going to explode.\"    Patient states she feels intermittently dizzy and has tunnel vision and that she is \"fading.\"  Patient denies chest pain, shortness of breath, diaphoresis, nausea/vomiting, calf pain, leg swelling.      ALLERGIES  Allergies   Allergen Reactions   • Inapsine [Droperidol] Anaphylaxis   • Keflex Rash and Swelling     RASH & SWELLING   • Morphine Unspecified     STOPS PEEING     • Penicillins Rash and Swelling     RASH & SWELLING       CURRENT MEDICATIONS  Home Medications     Reviewed by Huber Carlisle (Pharmacy Tech) on 09/23/19 at 1605  Med List Status: Complete   Medication Last Dose Status   ibuprofen (MOTRIN) 200 MG Tab FEW DAYS AGO Active                PAST MEDICAL HISTORY   has a past medical history of Angina, ASTHMA, Bronchitis, Cardiac arrhythmia, Chickenpox, Influenza, Kidney, horseshoe, Cruz's syndrome, Other acquired deformity of toe, Personal history of venous thrombosis and embolism, Pneumonia, PSVT (paroxysmal supraventricular tachycardia) (HCC), S/P ablation of ventricular arrhythmia, Ramirez-Parkinson-White syndrome, and WPW (Marisela-Parkinson-White syndrome).    SURGICAL HISTORY   has a past surgical history that includes zzz implantable cardioverter defibrillator (icd); qian by laparoscopy; other cardiac surgery; gyn surgery (2007); other (2003); other; repeat c section (6/18/2014); primary c section; cholecystectomy; other; pelviscopy (N/A, 9/9/2018); and oophorectomy (Left, 9/9/2018).    SOCIAL HISTORY  Social " "History     Tobacco Use   • Smoking status: Never Smoker   • Smokeless tobacco: Never Used   Substance and Sexual Activity   • Alcohol use: No     Alcohol/week: 0.0 oz   • Drug use: No   • Sexual activity: Yes     Partners: Male     Birth control/protection: Surgical       Family Hx:  Factor V Leiden       REVIEW OF SYSTEMS  See HPI for further details.  All other systems are negative except as above in HPI.      PHYSICAL EXAM  VITAL SIGNS: /57   Pulse 62   Temp 36.4 °C (97.5 °F) (Temporal)   Resp (!) 22   Ht 1.626 m (5' 4\")   Wt 85.1 kg (187 lb 9.8 oz)   LMP 09/22/2019 (Exact Date)   SpO2 98%   BMI 32.20 kg/m²     General:  WDWN, nontoxic appearing in NAD; A+Ox3; V/S as above; afebrile, not tachycardic or hypoxic  Skin: warm and dry; good color; no rash  HEENT: NCAT; EOMs intact; PERRL; no scleral icterus   Neck: FROM; no meningismus, no LAD; no thyromegaly; no stridor or hoarse voice; carotid pulse visible/noted on the right more than the left  Cardiovascular: Regular heart rate and rhythm.  No murmurs, rubs, or gallops; pulses 2+ bilaterally radially and DP areas  Lungs: Clear to auscultation with good air movement bilaterally.  No wheezes, rhonchi, or rales.   Abdomen: BS present; soft; NTND; no rebound, guarding, or rigidity.  No organomegaly or pulsatile mass  Extremities: MCCABE x 4; no e/o trauma; no pedal edema; neg Ruth's  Neurologic: CNs III-XII grossly intact; speech clear; distal sensation intact; strength 5/5 UE/LEs  Psychiatric: Appropriate affect, normal mood    LABS  Results for orders placed or performed during the hospital encounter of 09/23/19   CBC with Differential   Result Value Ref Range    WBC 12.7 (H) 4.8 - 10.8 K/uL    RBC 5.02 4.20 - 5.40 M/uL    Hemoglobin 14.9 12.0 - 16.0 g/dL    Hematocrit 42.8 37.0 - 47.0 %    MCV 85.3 81.4 - 97.8 fL    MCH 29.7 27.0 - 33.0 pg    MCHC 34.8 33.6 - 35.0 g/dL    RDW 41.8 35.9 - 50.0 fL    Platelet Count 278 164 - 446 K/uL    MPV 11.2 9.0 - " 12.9 fL    Neutrophils-Polys 64.10 44.00 - 72.00 %    Lymphocytes 24.80 22.00 - 41.00 %    Monocytes 7.80 0.00 - 13.40 %    Eosinophils 1.60 0.00 - 6.90 %    Basophils 0.60 0.00 - 1.80 %    Immature Granulocytes 1.10 (H) 0.00 - 0.90 %    Nucleated RBC 0.00 /100 WBC    Neutrophils (Absolute) 8.16 (H) 2.00 - 7.15 K/uL    Lymphs (Absolute) 3.16 1.00 - 4.80 K/uL    Monos (Absolute) 0.99 (H) 0.00 - 0.85 K/uL    Eos (Absolute) 0.20 0.00 - 0.51 K/uL    Baso (Absolute) 0.07 0.00 - 0.12 K/uL    Immature Granulocytes (abs) 0.14 (H) 0.00 - 0.11 K/uL    NRBC (Absolute) 0.00 K/uL   Complete Metabolic Panel (CMP)   Result Value Ref Range    Sodium 138 135 - 145 mmol/L    Potassium 3.9 3.6 - 5.5 mmol/L    Chloride 106 96 - 112 mmol/L    Co2 23 20 - 33 mmol/L    Anion Gap 9.0 0.0 - 11.9    Glucose 83 65 - 99 mg/dL    Bun 11 8 - 22 mg/dL    Creatinine 0.50 0.50 - 1.40 mg/dL    Calcium 9.7 8.5 - 10.5 mg/dL    AST(SGOT) 13 12 - 45 U/L    ALT(SGPT) 15 2 - 50 U/L    Alkaline Phosphatase 50 30 - 99 U/L    Total Bilirubin 1.2 0.1 - 1.5 mg/dL    Albumin 4.6 3.2 - 4.9 g/dL    Total Protein 6.8 6.0 - 8.2 g/dL    Globulin 2.2 1.9 - 3.5 g/dL    A-G Ratio 2.1 g/dL   Troponin   Result Value Ref Range    Troponin T <6 6 - 19 ng/L   TSH   Result Value Ref Range    TSH 1.300 0.380 - 5.330 uIU/mL   ESTIMATED GFR   Result Value Ref Range    GFR If African American >60 >60 mL/min/1.73 m 2    GFR If Non African American >60 >60 mL/min/1.73 m 2   HCG QUAL SERUM   Result Value Ref Range    Beta-Hcg Qualitative Serum Negative Negative   MAGNESIUM   Result Value Ref Range    Magnesium 1.8 1.5 - 2.5 mg/dL   PHOSPHORUS   Result Value Ref Range    Phosphorus 3.4 2.5 - 4.5 mg/dL   EKG (NOW)   Result Value Ref Range    Report       [facility] Emergency Dept.    Test Date:  2019-09-23  Pt Name:    EMILE HOLLINGSWORTH              Department: ER  MRN:        [id number]                      Room:  Gender:     Female                       Technician:  [id number]  :        [id number]                   Requested By:ER TRIAGE PROTOCOL  Order #:    [id number]                    Reading MD: EMMANUEL MCKEON MD    Measurements  Intervals                                Axis  Rate:       72                           P:          40  UT:         184                          QRS:        -115  QRSD:       146                          T:          53  QT:         452  QTc:        495    Interpretive Statements  SINUS RHYTHM  RIGHT BUNDLE BRANCH BLOCK  Compared to ECG 2018 10:45:45  Left posterior fascicular block no longer present    Electronically Signed On 2019 15:23:56 PDT by EMMANUEL MCKEON MD           IMAGING  CT-CTA NECK WITH & W/O-POST PROCESSING   Final Result      CT angiogram of the neck within normal limits.      DX-CHEST-PORTABLE (1 VIEW)   Final Result      No acute cardiopulmonary abnormality.          MEDICAL RECORD  I have reviewed patient's medical record and pertinent results are listed below.      COURSE & MEDICAL DECISION MAKING  I have reviewed any medical record information, laboratory studies and radiographic results as noted.    Heidi Gaspar is a 39 y.o. female with a history of WPW, factor V Leiden, and Cruz's syndrome who presents complaining of palpitations, dizziness, fullness in her neck.  I considered PE however the patient is not tachycardic or hypoxic.  I felt that for a PE to be causing near syncope, dizziness, it would likely be causing hemodynamic instability and changes in vital signs.  This may be considered if her work-up is otherwise negative.  I also considered ACS and WPW, electrolyte abnormality.    Patient's labs demonstrate a white count of 12.7, elevation of immature granulocytes to 1.1 which is of unknown etiology at this time, normal troponin, and normal chemistry panel.  TSH is normal.    CTA of the neck demonstrates no acute pathology.      5:34 PM  Paging cardiology    I discussed the case with the  cardiologist on call.  He will come see the patient.  Pt advised.    6:40 PM  I discussed the case with the hospitalist/Chip who agrees to admit the patient for echo/telemetry monitoring per cardiology recommendation.      FINAL IMPRESSION  1. Palpitations     2. Near syncope         Electronically signed by: Karyn Hill, 9/23/2019 3:24 PM

## 2019-09-24 ENCOUNTER — APPOINTMENT (OUTPATIENT)
Dept: CARDIOLOGY | Facility: MEDICAL CENTER | Age: 39
End: 2019-09-24
Attending: FAMILY MEDICINE
Payer: COMMERCIAL

## 2019-09-24 ENCOUNTER — PATIENT OUTREACH (OUTPATIENT)
Dept: HEALTH INFORMATION MANAGEMENT | Facility: OTHER | Age: 39
End: 2019-09-24

## 2019-09-24 ENCOUNTER — TELEPHONE (OUTPATIENT)
Dept: CARDIOLOGY | Facility: MEDICAL CENTER | Age: 39
End: 2019-09-24

## 2019-09-24 VITALS
WEIGHT: 184.97 LBS | HEART RATE: 68 BPM | HEIGHT: 64 IN | SYSTOLIC BLOOD PRESSURE: 111 MMHG | DIASTOLIC BLOOD PRESSURE: 63 MMHG | TEMPERATURE: 97.5 F | OXYGEN SATURATION: 93 % | BODY MASS INDEX: 31.58 KG/M2 | RESPIRATION RATE: 17 BRPM

## 2019-09-24 DIAGNOSIS — R00.2 PALPITATIONS: ICD-10-CM

## 2019-09-24 DIAGNOSIS — R07.89 CHEST DISCOMFORT: ICD-10-CM

## 2019-09-24 PROBLEM — R07.9 CHEST PAIN: Status: RESOLVED | Noted: 2019-09-23 | Resolved: 2019-09-24

## 2019-09-24 PROBLEM — E83.42 HYPOMAGNESEMIA: Status: RESOLVED | Noted: 2019-09-23 | Resolved: 2019-09-24

## 2019-09-24 LAB
ALBUMIN SERPL BCP-MCNC: 3.9 G/DL (ref 3.2–4.9)
ALBUMIN/GLOB SERPL: 2.2 G/DL
ALP SERPL-CCNC: 45 U/L (ref 30–99)
ALT SERPL-CCNC: 11 U/L (ref 2–50)
ANION GAP SERPL CALC-SCNC: 9 MMOL/L (ref 0–11.9)
APTT PPP: 28.4 SEC (ref 24.7–36)
AST SERPL-CCNC: 11 U/L (ref 12–45)
BASOPHILS # BLD AUTO: 0.6 % (ref 0–1.8)
BASOPHILS # BLD: 0.06 K/UL (ref 0–0.12)
BILIRUB SERPL-MCNC: 1.4 MG/DL (ref 0.1–1.5)
BUN SERPL-MCNC: 10 MG/DL (ref 8–22)
CALCIUM SERPL-MCNC: 8.8 MG/DL (ref 8.5–10.5)
CHLORIDE SERPL-SCNC: 108 MMOL/L (ref 96–112)
CO2 SERPL-SCNC: 20 MMOL/L (ref 20–33)
CREAT SERPL-MCNC: 0.51 MG/DL (ref 0.5–1.4)
EOSINOPHIL # BLD AUTO: 0.27 K/UL (ref 0–0.51)
EOSINOPHIL NFR BLD: 2.8 % (ref 0–6.9)
ERYTHROCYTE [DISTWIDTH] IN BLOOD BY AUTOMATED COUNT: 42 FL (ref 35.9–50)
GLOBULIN SER CALC-MCNC: 1.8 G/DL (ref 1.9–3.5)
GLUCOSE SERPL-MCNC: 84 MG/DL (ref 65–99)
HCT VFR BLD AUTO: 38.8 % (ref 37–47)
HGB BLD-MCNC: 14.2 G/DL (ref 12–16)
IMM GRANULOCYTES # BLD AUTO: 0.09 K/UL (ref 0–0.11)
IMM GRANULOCYTES NFR BLD AUTO: 0.9 % (ref 0–0.9)
INR PPP: 1.18 (ref 0.87–1.13)
LV EJECT FRACT  99904: 75
LV EJECT FRACT MOD 2C 99903: 66.4
LV EJECT FRACT MOD 4C 99902: 67.97
LV EJECT FRACT MOD BP 99901: 67.65
LYMPHOCYTES # BLD AUTO: 2.91 K/UL (ref 1–4.8)
LYMPHOCYTES NFR BLD: 29.7 % (ref 22–41)
MCH RBC QN AUTO: 31.4 PG (ref 27–33)
MCHC RBC AUTO-ENTMCNC: 36.6 G/DL (ref 33.6–35)
MCV RBC AUTO: 85.8 FL (ref 81.4–97.8)
MONOCYTES # BLD AUTO: 1.03 K/UL (ref 0–0.85)
MONOCYTES NFR BLD AUTO: 10.5 % (ref 0–13.4)
NEUTROPHILS # BLD AUTO: 5.43 K/UL (ref 2–7.15)
NEUTROPHILS NFR BLD: 55.5 % (ref 44–72)
NRBC # BLD AUTO: 0 K/UL
NRBC BLD-RTO: 0 /100 WBC
PLATELET # BLD AUTO: 251 K/UL (ref 164–446)
PMV BLD AUTO: 11.2 FL (ref 9–12.9)
POTASSIUM SERPL-SCNC: 4.3 MMOL/L (ref 3.6–5.5)
PROT SERPL-MCNC: 5.7 G/DL (ref 6–8.2)
PROTHROMBIN TIME: 15.3 SEC (ref 12–14.6)
RBC # BLD AUTO: 4.52 M/UL (ref 4.2–5.4)
SODIUM SERPL-SCNC: 137 MMOL/L (ref 135–145)
TROPONIN T SERPL-MCNC: <6 NG/L (ref 6–19)
WBC # BLD AUTO: 9.8 K/UL (ref 4.8–10.8)

## 2019-09-24 PROCEDURE — 93306 TTE W/DOPPLER COMPLETE: CPT | Mod: 26 | Performed by: INTERNAL MEDICINE

## 2019-09-24 PROCEDURE — G0378 HOSPITAL OBSERVATION PER HR: HCPCS

## 2019-09-24 PROCEDURE — 99214 OFFICE O/P EST MOD 30 MIN: CPT | Mod: GC | Performed by: INTERNAL MEDICINE

## 2019-09-24 PROCEDURE — 85610 PROTHROMBIN TIME: CPT

## 2019-09-24 PROCEDURE — 85025 COMPLETE CBC W/AUTO DIFF WBC: CPT

## 2019-09-24 PROCEDURE — 84484 ASSAY OF TROPONIN QUANT: CPT

## 2019-09-24 PROCEDURE — 80053 COMPREHEN METABOLIC PANEL: CPT

## 2019-09-24 PROCEDURE — 36415 COLL VENOUS BLD VENIPUNCTURE: CPT

## 2019-09-24 PROCEDURE — 85730 THROMBOPLASTIN TIME PARTIAL: CPT

## 2019-09-24 PROCEDURE — 99217 PR OBSERVATION CARE DISCHARGE: CPT | Performed by: INTERNAL MEDICINE

## 2019-09-24 PROCEDURE — 93306 TTE W/DOPPLER COMPLETE: CPT

## 2019-09-24 NOTE — PROGRESS NOTES
IV Dc 'd. Discharge instructions provided to patient. Pt verbalizes understanding. Pt states all questions have been answered. Copy of DC provided to patient. Signed copy in chart. Pt states all personal belongings are in possession.  Pt escorted off unit by this RN without incident. Refused w/c.  with patient.

## 2019-09-24 NOTE — ASSESSMENT & PLAN NOTE
Chest pressure x 2 hours yesterday, no associated symptoms of diaphoresis, nausea or shortness of breath   - trop x 3 negative, no EKG changes   - echocardiogram shows normal systolic and diastolic function without wall abnormalities, mild MR no change from previous   - plan for outpatient stress echocardiogram

## 2019-09-24 NOTE — DISCHARGE PLANNING
Care Transition Team Assessment    Spoke with patient at bedside. Anticipate no needs @ present. Spouse will be ride @ D/C.    Information Source  Orientation : Oriented x 4  Information Given By: Patient    Readmission Evaluation  Is this a readmission?: No    Interdisciplinary Discharge Planning  Does Admitting Nurse Feel This Could be a Complex Discharge?: No  Primary Care Physician: Javy Fritz  Lives with - Patient's Self Care Capacity: Spouse, Child Less than 18 Years of Age  Patient or legal guardian wants to designate a caregiver (see row info): No  Support Systems: Family Member(s), Spouse / Significant Other  Housing / Facility: [address] [place] [address]  Do You Take your Prescribed Medications Regularly: No  Reasons Why Not Taking Medications : (No RX's.)  Able to Return to Previous ADL's: Yes  Mobility Issues: No  Prior Services: Home-Independent  Patient Expects to be Discharged to:: Home  Assistance Needed: No  Durable Medical Equipment: Not Applicable    Discharge Preparedness  What are your discharge supports?: Spouse  Prior Functional Level: Ambulatory    Functional Assesment  Prior Functional Level: Ambulatory    Finances  Prescription Coverage: Yes    Anticipated Discharge Information  Anticipated discharge disposition: Home  Discharge Address: [address] [place] [address]  Discharge Contact Phone Number: [phone]

## 2019-09-24 NOTE — CARE PLAN
Problem: Hemodynamic Status  Goal: Stable Vital Signs and Fluid Balance  Outcome: PROGRESSING SLOWER THAN EXPECTED     Problem: Psychosocial Needs:  Goal: Level of anxiety will decrease  Outcome: PROGRESSING SLOWER THAN EXPECTED

## 2019-09-24 NOTE — CARE PLAN
Problem: Psychosocial Needs:  Goal: Level of anxiety will decrease  Outcome: PROGRESSING AS EXPECTED  Interventions: Identify and develop with patient strategies to cope with anxiety triggers. Allow for verbalization of concerns and feelings. Implement non-pharmacological interventions (rest, distraction, quiet environment, therapeutic listening) Promote sleep and relaxation.

## 2019-09-24 NOTE — DISCHARGE INSTRUCTIONS
Discharge Instructions    Discharged to home by car with relative. Discharged via walking, hospital escort: Refused.  Special equipment needed: Not Applicable    Be sure to schedule a follow-up appointment with your primary care doctor or any specialists as instructed.     Discharge Plan:   Diet Plan: Discussed  Activity Level: Discussed  Confirmed Follow up Appointment: Patient to Call and Schedule Appointment  Confirmed Symptoms Management: Discussed  Medication Reconciliation Updated: Yes  Influenza Vaccine Indication: Patient Refuses, Indicated: 9 to 64 years of age    I understand that a diet low in cholesterol, fat, and sodium is recommended for good health. Unless I have been given specific instructions below for another diet, I accept this instruction as my diet prescription.   Other diet: heart healthy    Special Instructions: None    · Is patient discharged on Warfarin / Coumadin?   No     Depression / Suicide Risk    As you are discharged from this [facility] facility, it is important to learn how to keep safe from harming yourself.    Recognize the warning signs:  · Abrupt changes in personality, positive or negative- including increase in energy   · Giving away possessions  · Change in eating patterns- significant weight changes-  positive or negative  · Change in sleeping patterns- unable to sleep or sleeping all the time   · Unwillingness or inability to communicate  · Depression  · Unusual sadness, discouragement and loneliness  · Talk of wanting to die  · Neglect of personal appearance   · Rebelliousness- reckless behavior  · Withdrawal from people/activities they love  · Confusion- inability to concentrate     If you or a loved one observes any of these behaviors or has concerns about self-harm, here's what you can do:  · Talk about it- your feelings and reasons for harming yourself  · Remove any means that you might use to hurt yourself (examples: pills, rope, extension cords, firearm)  · Get  professional help from the community (Mental Health, Substance Abuse, psychological counseling)  · Do not be alone:Call your Safe Contact- someone whom you trust who will be there for you.  · Call your local CRISIS HOTLINE 199-8957 or 135-249-9871  · Call your local Children's Mobile Crisis Response Team [place] (215) 327-2242 or www.Labtrip  · Call the toll free National Suicide Prevention Hotlines   · National Suicide Prevention Lifeline 558-936-OUYI (9694)  · [employer] Hope Line Network 800-SUICIDE (545-2656)      Discharge Instructions per KALEIGH Osorio    With Cardiology for outpatient stress echo and Holter Monitoring  Please follow up with PCP   Take all medications as prescribed   DIET: As tolerated   ACTIVITY: As tolerated   DIAGNOSIS: palpatations  Return to ER if shortness of breath, chest pain, dizziness, syncope, or high fever         Holter Monitoring  A Holter monitor is a small device that is used to detect abnormal heart rhythms. It clips to your clothing and is connected by wires to flat, sticky disks (electrodes) that attach to your chest. It is worn continuously for 24-48 hours.  HOME CARE INSTRUCTIONS  · Wear your Holter monitor at all times, even while exercising and sleeping, for as long as directed by your health care provider.  · Make sure that the Holter monitor is safely clipped to your clothing or close to your body as recommended by your health care provider.  · Do not get the monitor or wires wet.  · Do not put body lotion or moisturizer on your chest.  · Keep your skin clean.  · Keep a diary of your daily activities, such as walking and doing chores. If you feel that your heartbeat is abnormal or that your heart is fluttering or skipping a beat:  ¨ Record what you are doing when it happens.  ¨ Record what time of day the symptoms occur.  · Return your Holter monitor as directed by your health care provider.  · Keep all follow-up visits as directed by your  health care provider. This is important.  SEEK IMMEDIATE MEDICAL CARE IF:  · You feel lightheaded or you faint.  · You have trouble breathing.  · You feel pain in your chest, upper arm, or jaw.  · You feel sick to your stomach and your skin is pale, cool, or damp.  · You heartbeat feels unusual or abnormal.     This information is not intended to replace advice given to you by your health care provider. Make sure you discuss any questions you have with your health care provider.     Document Released: 09/15/2005 Document Revised: 01/08/2016 Document Reviewed: 07/27/2015  [employer] Patient Education ©2016 [employer]      Cardiac Arrhythmia  Your heart is a muscle that works to pump blood through your body by regular contractions. The beating of your heart is controlled by a system of special pacemaker cells. These cells control the electrical activity of the heart. When the system controlling this regular beating is disturbed, a heart rhythm abnormality (arrhythmia) results.  WHEN YOUR HEART SKIPS A BEAT  One of the most common and least serious heart arrhythmias is called an ectopic or premature atrial heartbeat (PAC). This may be noticed as a small change in your regular pulse. A PAC originates from the top part (atrium) of the heart. Within the right atrium, the SA node is the area that normally controls the regularity of the heart. PACs occur in heart tissue outside of the SA node region. You may feel this as a skipped beat or heart flutter, especially if several occur in succession or occur frequently.   Another arrhythmia is ventricular premature complex (VCP or PVC). These extra beats start out in the bottom, more muscular chambers of the heart. In most cases a PVC is harmless. If there are underlying causes that are making the heart irritable such as an overactive thyroid or a prior heart attack PVCs may be of more concern. In a few cases, medications to control the heart rhythm may be  prescribed.  Things to try at home:  · Cut down or avoid alcohol, tobacco and caffeine.  · Get enough sleep.  · Reduce stress.  · Exercise more.  WHEN THE HEART BEATS TOO FAST  Atrial tachycardia is a fast heart rate, which starts out in the atrium. It may last from minutes to much longer. Your heart may beat 140 to 240 times per minute instead of the normal 60 to 100.  · Symptoms include a worried feeling (anxiety) and a sense that your heart is beating fast and hard.  · You may be able to stop the fast rate by holding your breath or bearing down as if you were going to have a bowel movement.  · This type of fast rate is usually not dangerous.  Atrial fibrillation and atrial flutter are other fast rhythms that start in the atria. Both conditions keep the atria from filling with enough blood so the heart does not work well.  · Symptoms include feeling light-headed or faint.  · These fast rates may be the result of heart damage or disease. Too much thyroid hormone may play a role.  · There may be no clear cause or it may be from heart disease or damage.  · Medication or a special electrical treatment (cardioversion) may be needed to get the heart beating normally.  Ventricular tachycardia is a fast heart rate that starts in the lower muscular chambers (ventricles) This is a serious disorder that requires treatment as soon as possible. You need someone else to get and use a small defibrillator.  · Symptoms include collapse, chest pain, or being short of breath.  · Treatment may include medication, procedures to improve blood flow to the heart, or an implantable cardiac defibrillator (ICD).  DIAGNOSIS   · A cardiogram (EKG or ECG) will be done to see the arrhythmia, as well as lab tests to check the underlying cause.  · If the extra beats or fast rate come and go, you may wear a Holter monitor that records your heart rate for a longer period of time.  SEEK MEDICAL CARE IF:  · You have irregular or fast heartbeats  (palpitations).  · You experience skipped beats.  · You develop lightheadedness.  · You have chest discomfort.  · You have shortness of breath.  · You have more frequent episodes, if you are already being treated.  SEEK IMMEDIATE MEDICAL CARE IF:   · You have severe chest pain, especially if the pain is crushing or pressure-like and spreads to the arms, back, neck, or jaw, or if you have sweating, feeling sick to your stomach (nausea), or shortness of breath. THIS IS AN EMERGENCY. Do not wait to see if the pain will go away. Get medical help at once. Call 911 or 0 (). DO NOT drive yourself to the hospital.  · You feel dizzy or faint.  · You have episodes of previously documented atrial tachycardia that do not resolve with the techniques your caregiver has taught you.  · Irregular or rapid heartbeats begin to occur more often than in the past, especially if they are associated with more pronounced symptoms or of longer duration.  Document Released: 12/18/2006 Document Revised: 03/11/2013 Document Reviewed: 08/05/2009  [employer]® Patient Information ©2014 [employer].

## 2019-09-24 NOTE — ASSESSMENT & PLAN NOTE
Patient with hx of WPW s/p ablation, EKG and telemetry showed no arrhythmias  - symptoms may be secondary to PVC/PACs   - plan for outpatient 48 hour Holter monitor

## 2019-09-24 NOTE — CONSULTS
[facility] Cardiology Follow-up Consult Note    Date of note:    9/24/2019      Consulting Physician: Malik De León M.D.      Chief Complaint   Patient presents with   • Palpitations   • Chest Pressure     x 4-5 nights        HPI:   40 yo F with PMHx of WPW s/p multiple ablations with most recent in 2006, Cruz's syndrome, factor V Leiden who presented to the ED with 4-5 days of palpitations with radiation into the neck and 1 episode of chest pressure that occurred the day of admission. Pressure lasted approximately 2 hours than improved. Patient denies symptoms like these before.     Interim Events:  - no new EKG changes, troponin neg x 3  - tele shows no arrhythmias, sinus james asymptomatic   - plan on discharge w/ outpatient stress echo and 48 hour holter monitor, pt has follow up with her cardiologist on 10/10 to review results       ROS  No NV, No Bleeding, No dizziness, no chest pain, palpitations, lightheadedness or dizziness   All other review of systems reviewed and negative.    Past medical, surgical, social, and family history reviewed and unchanged from admission except as noted in assessment and plan.    Medications: Reviewed in MAR  Current Facility-Administered Medications   Medication Dose Frequency Provider Last Rate Last Dose   • iohexol (OMNIPAQUE) 350 mg/mL  100 mL Once Karyn Hill M.D.   Stopped at 09/23/19 1715   • aspirin (ASA) tablet 325 mg  325 mg DAILY Zurdo Saunders M.D.        Or   • aspirin (ASA) chewable tab 324 mg  324 mg DAILY Zurdo Saunders M.D.   Stopped at 09/24/19 0600    Or   • aspirin (ASA) suppository 300 mg  300 mg DAILY Zurdo Saunders M.D.       • senna-docusate (PERICOLACE or SENOKOT S) 8.6-50 MG per tablet 2 Tab  2 Tab BID Zurdo Saunders M.D.        And   • polyethylene glycol/lytes (MIRALAX) PACKET 1 Packet  1 Packet QDAY PRN Zurdo Saunders M.D.        And   • magnesium hydroxide (MILK OF MAGNESIA) suspension 30 mL  30 mL QDAY PRN Zurdo Saunders M.D.  "       And   • bisacodyl (DULCOLAX) suppository 10 mg  10 mg QDAY PRN Zurdo Saunders M.D.       • NS infusion   Continuous Zurdo Saunders M.D. 75 mL/hr at 09/24/19 0700     • enoxaparin (LOVENOX) inj 40 mg  40 mg DAILY Zurdo Saunders M.D.       • acetaminophen (TYLENOL) tablet 650 mg  650 mg Q6HRS PRN Zurdo Saunders M.D.       • promethazine (PHENERGAN) tablet 12.5-25 mg  12.5-25 mg Q4HRS PRN Zurdo Saunders M.D.       • promethazine (PHENERGAN) suppository 12.5-25 mg  12.5-25 mg Q4HRS PRN Zurdo Saunders M.D.       • prochlorperazine (COMPAZINE) injection 5-10 mg  5-10 mg Q4HRS PRN Zurdo Saunders M.D.       • oxyCODONE immediate-release (ROXICODONE) tablet 5-10 mg  5-10 mg Q4HRS PRASHLEY Saunders M.D.         Last reviewed on 9/23/2019  4:05 PM by Huber Carlisle  Allergies   Allergen Reactions   • Inapsine [Droperidol] Anaphylaxis   • Keflex Rash and Swelling     RASH & SWELLING   • Morphine Unspecified     STOPS PEEING     • Penicillins Rash and Swelling     RASH & SWELLING       Physical Exam  Body mass index is 31.75 kg/m². /58   Pulse 61   Temp 36.6 °C (97.9 °F) (Temporal)   Resp 18   Ht 1.626 m (5' 4\")   Wt 83.9 kg (184 lb 15.5 oz)   SpO2 97%    Vitals:    09/23/19 2110 09/23/19 2358 09/24/19 0444 09/24/19 0843   BP:  (!) 95/50 (!) 95/62 104/58   Pulse:  67 (!) 59 61   Resp:  17 16 18   Temp: 36.8 °C (98.3 °F) 36.5 °C (97.7 °F) 36.4 °C (97.6 °F) 36.6 °C (97.9 °F)   TempSrc:  Temporal Temporal Temporal   SpO2: 97% 98% 95% 97%   Weight: 83.9 kg (184 lb 15.5 oz)      Height: 1.626 m (5' 4\")       Oxygen Therapy:  Pulse Oximetry: 97 %, O2 (LPM): 0, O2 Delivery: None (Room Air)    General: non-toxic appearing female, appears stated age, no apparent distress  HEENT: NCAT, conjunctiva normal, no JVD   Lungs: normal respiratory effort, CTAB  Heart: RRR, no murmurs, no rubs or gallops,   EXT: no edema. + pedal pulses. no cyanosis  Abdomen: soft, non tender, non " distended  Neurological: A/O x 3. No focal sensory deficits  Skin: Warm extremities    Labs (personally reviewed and notable for):   Recent Results (from the past 24 hour(s))   EKG (NOW)    Collection Time: 19  1:11 PM   Result Value Ref Range    Report       [facility] Emergency Dept.    Test Date:  2019  Pt Name:    EMILE HOLLINGSWORTH              Department: ER  MRN:        [id number]                      Room:  Gender:     Female                       Technician: [id number]  :        1980                   Requested By:ER TRIAGE PROTOCOL  Order #:    [id number]                    Reading MD: EMMANUEL MCKEON MD    Measurements  Intervals                                Axis  Rate:       72                           P:          40  MN:         184                          QRS:        -115  QRSD:       146                          T:          53  QT:         452  QTc:        495    Interpretive Statements  SINUS RHYTHM  RIGHT BUNDLE BRANCH BLOCK  Compared to ECG 2018 10:45:45  Left posterior fascicular block no longer present    Electronically Signed On 2019 15:23:56 PDT by EMMANUEL MCKEON MD     CBC with Differential    Collection Time: 19  3:05 PM   Result Value Ref Range    WBC 12.7 (H) 4.8 - 10.8 K/uL    RBC 5.02 4.20 - 5.40 M/uL    Hemoglobin 14.9 12.0 - 16.0 g/dL    Hematocrit 42.8 37.0 - 47.0 %    MCV 85.3 81.4 - 97.8 fL    MCH 29.7 27.0 - 33.0 pg    MCHC 34.8 33.6 - 35.0 g/dL    RDW 41.8 35.9 - 50.0 fL    Platelet Count 278 164 - 446 K/uL    MPV 11.2 9.0 - 12.9 fL    Neutrophils-Polys 64.10 44.00 - 72.00 %    Lymphocytes 24.80 22.00 - 41.00 %    Monocytes 7.80 0.00 - 13.40 %    Eosinophils 1.60 0.00 - 6.90 %    Basophils 0.60 0.00 - 1.80 %    Immature Granulocytes 1.10 (H) 0.00 - 0.90 %    Nucleated RBC 0.00 /100 WBC    Neutrophils (Absolute) 8.16 (H) 2.00 - 7.15 K/uL    Lymphs (Absolute) 3.16 1.00 - 4.80 K/uL    Monos (Absolute) 0.99 (H) 0.00 - 0.85 K/uL     Eos (Absolute) 0.20 0.00 - 0.51 K/uL    Baso (Absolute) 0.07 0.00 - 0.12 K/uL    Immature Granulocytes (abs) 0.14 (H) 0.00 - 0.11 K/uL    NRBC (Absolute) 0.00 K/uL   Complete Metabolic Panel (CMP)    Collection Time: 09/23/19  3:05 PM   Result Value Ref Range    Sodium 138 135 - 145 mmol/L    Potassium 3.9 3.6 - 5.5 mmol/L    Chloride 106 96 - 112 mmol/L    Co2 23 20 - 33 mmol/L    Anion Gap 9.0 0.0 - 11.9    Glucose 83 65 - 99 mg/dL    Bun 11 8 - 22 mg/dL    Creatinine 0.50 0.50 - 1.40 mg/dL    Calcium 9.7 8.5 - 10.5 mg/dL    AST(SGOT) 13 12 - 45 U/L    ALT(SGPT) 15 2 - 50 U/L    Alkaline Phosphatase 50 30 - 99 U/L    Total Bilirubin 1.2 0.1 - 1.5 mg/dL    Albumin 4.6 3.2 - 4.9 g/dL    Total Protein 6.8 6.0 - 8.2 g/dL    Globulin 2.2 1.9 - 3.5 g/dL    A-G Ratio 2.1 g/dL   Troponin    Collection Time: 09/23/19  3:05 PM   Result Value Ref Range    Troponin T <6 6 - 19 ng/L   TSH    Collection Time: 09/23/19  3:05 PM   Result Value Ref Range    TSH 1.300 0.380 - 5.330 uIU/mL   ESTIMATED GFR    Collection Time: 09/23/19  3:05 PM   Result Value Ref Range    GFR If African American >60 >60 mL/min/1.73 m 2    GFR If Non African American >60 >60 mL/min/1.73 m 2   HCG QUAL SERUM    Collection Time: 09/23/19  3:05 PM   Result Value Ref Range    Beta-Hcg Qualitative Serum Negative Negative   MAGNESIUM    Collection Time: 09/23/19  3:05 PM   Result Value Ref Range    Magnesium 1.8 1.5 - 2.5 mg/dL   PHOSPHORUS    Collection Time: 09/23/19  3:05 PM   Result Value Ref Range    Phosphorus 3.4 2.5 - 4.5 mg/dL   D-DIMER    Collection Time: 09/23/19  3:05 PM   Result Value Ref Range    D-Dimer Screen <0.40 0.00 - 0.50 ug/mL (FEU)   TROPONIN    Collection Time: 09/23/19  7:52 PM   Result Value Ref Range    Troponin T <6 6 - 19 ng/L   EKG    Collection Time: 09/23/19  9:41 PM   Result Value Ref Range    Report       [facility]    Test Date:  2019-09-23  Pt Name:    EMILE MIKAELAJER              Department: ER  MRN:        [id number]                       Room:       T2  Gender:     Female                       Technician: DARWIN  :        1980                   Requested By:ER TRIAGE PROTOCOL  Order #:    [id number]                    Reading MD:    Measurements  Intervals                                Axis  Rate:       63                           P:          26  ME:         191                          QRS:        -116  QRSD:       162                          T:          58  QT:         454  QTc:        465    Interpretive Statements  SINUS RHYTHM  RIGHT BUNDLE BRANCH BLOCK  Compared to ECG 2019 13:11:52  No significant changes     EKG in four (4) hours    Collection Time: 19  9:41 PM   Result Value Ref Range    Report       [facility]    Test Date:  2019  Pt Name:    EMILE HOLLINGSWORTH              Department: ER  MRN:        [id number]                      Room:       [facility]  Gender:     Female                       Technician: JASSIG  :        1980                   Requested By:DENI MULTANI  Order #:    [id number]                    Reading MD:    Measurements  Intervals                                Axis  Rate:       63                           P:          26  ME:         191                          QRS:        -116  QRSD:       162                          T:          58  QT:         454  QTc:        465    Interpretive Statements  SINUS RHYTHM  RIGHT BUNDLE BRANCH BLOCK  Compared to ECG 2019 13:11:52  No significant changes     TROPONIN    Collection Time: 19  2:45 AM   Result Value Ref Range    Troponin T <6 6 - 19 ng/L   CBC with Differential    Collection Time: 19  2:45 AM   Result Value Ref Range    WBC 9.8 4.8 - 10.8 K/uL    RBC 4.52 4.20 - 5.40 M/uL    Hemoglobin 14.2 12.0 - 16.0 g/dL    Hematocrit 38.8 37.0 - 47.0 %    MCV 85.8 81.4 - 97.8 fL    MCH 31.4 27.0 - 33.0 pg    MCHC 36.6 (H) 33.6 - 35.0 g/dL    RDW 42.0 35.9 - 50.0 fL    Platelet Count 251 164 - 446 K/uL    MPV  11.2 9.0 - 12.9 fL    Neutrophils-Polys 55.50 44.00 - 72.00 %    Lymphocytes 29.70 22.00 - 41.00 %    Monocytes 10.50 0.00 - 13.40 %    Eosinophils 2.80 0.00 - 6.90 %    Basophils 0.60 0.00 - 1.80 %    Immature Granulocytes 0.90 0.00 - 0.90 %    Nucleated RBC 0.00 /100 WBC    Neutrophils (Absolute) 5.43 2.00 - 7.15 K/uL    Lymphs (Absolute) 2.91 1.00 - 4.80 K/uL    Monos (Absolute) 1.03 (H) 0.00 - 0.85 K/uL    Eos (Absolute) 0.27 0.00 - 0.51 K/uL    Baso (Absolute) 0.06 0.00 - 0.12 K/uL    Immature Granulocytes (abs) 0.09 0.00 - 0.11 K/uL    NRBC (Absolute) 0.00 K/uL   Comp Metabolic Panel (CMP)    Collection Time: 09/24/19  2:45 AM   Result Value Ref Range    Sodium 137 135 - 145 mmol/L    Potassium 4.3 3.6 - 5.5 mmol/L    Chloride 108 96 - 112 mmol/L    Co2 20 20 - 33 mmol/L    Anion Gap 9.0 0.0 - 11.9    Glucose 84 65 - 99 mg/dL    Bun 10 8 - 22 mg/dL    Creatinine 0.51 0.50 - 1.40 mg/dL    Calcium 8.8 8.5 - 10.5 mg/dL    AST(SGOT) 11 (L) 12 - 45 U/L    ALT(SGPT) 11 2 - 50 U/L    Alkaline Phosphatase 45 30 - 99 U/L    Total Bilirubin 1.4 0.1 - 1.5 mg/dL    Albumin 3.9 3.2 - 4.9 g/dL    Total Protein 5.7 (L) 6.0 - 8.2 g/dL    Globulin 1.8 (L) 1.9 - 3.5 g/dL    A-G Ratio 2.2 g/dL   PT/INR    Collection Time: 09/24/19  2:45 AM   Result Value Ref Range    PT 15.3 (H) 12.0 - 14.6 sec    INR 1.18 (H) 0.87 - 1.13   PTT    Collection Time: 09/24/19  2:45 AM   Result Value Ref Range    APTT 28.4 24.7 - 36.0 sec   ESTIMATED GFR    Collection Time: 09/24/19  2:45 AM   Result Value Ref Range    GFR If African American >60 >60 mL/min/1.73 m 2    GFR If Non African American >60 >60 mL/min/1.73 m 2   EC-ECHOCARDIOGRAM COMPLETE W/O CONT    Collection Time: 09/24/19 10:50 AM   Result Value Ref Range    Eject.Frac. MOD BP 67.65     Eject.Frac. MOD 4C 67.97     Eject.Frac. MOD 2C 66.4     Left Ventrical Ejection Fraction 75        Cardiac Imaging and Procedures Review:    EKG and telemetry tracings personally  reviewed      ASSESSMENT & PLAN    38 yo F w/ hx of WPW presents with palpitations and chest pressure. Telemetry is showing no arrhythmias, symptoms may be secondary to PVC/PAC, cardiac workup has thus far been negative, ACS unlikely. Echocardiogram unremarkable without evidence of valve dysfunction, plan for 48 hour holter monitor and echo stress test as outpatient for further evaluation.     Palpitations  Patient with hx of WPW s/p ablation, EKG and telemetry showed no arrhythmias  - symptoms may be secondary to PVC/PACs   - plan for outpatient 48 hour Holter monitor     WPW (Marisela-Parkinson-White syndrome)  Stable, s/p ablation   - EKG shows RBBB, no new changes     Chest pain  Chest pressure x 2 hours yesterday, no associated symptoms of diaphoresis, nausea or shortness of breath   - trop x 3 negative, no EKG changes   - echocardiogram shows normal systolic and diastolic function without wall abnormalities, mild MR no change from previous   - plan for outpatient stress echocardiogram     Patient is clear for discharge from cardiology standpoint. She will need to follow up with cardiology as scheduled.     It is my pleasure to participate in the care of Ms. Gaspar.  Please do not hesitate to contact me with questions or concerns.

## 2019-09-24 NOTE — CONSULTS
"Asked to see patient with palpitations by Dr Karyn Hill.    Cardiology Note    Chief Complaint   Patient presents with   • Palpitations   • Chest Pressure     x 4-5 nights        History of Present Illness: Heidi Gaspar is a 39 y.o. 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    Explains last 4-5 days increased burden of tachycardia and palpitations as though \"skipped beat.\" Associated with chest pressure that radiates into her neck and dyspnea whenever palpitations occur. Intermittent. Self terminating. Was due to follow up with Dr De Jesus, but presented to ER as could no longer tolerate symptoms. Denies syncope, lh/dizziness.    Denies tobacco, alcohol, illicit drugs.  Family history father who passed from complications of valve surgery at elderly age.    Review of Systems   Constitution: Negative for decreased appetite, fever, malaise/fatigue, weight gain and weight loss.   HENT: Negative for congestion and nosebleeds.    Eyes: Negative for blurred vision.   Cardiovascular: Positive for chest pain and palpitations. Negative for claudication, dyspnea on exertion, irregular heartbeat, leg swelling, near-syncope, orthopnea, paroxysmal nocturnal dyspnea and syncope.   Respiratory: Positive for shortness of breath. Negative for cough.    Endocrine: Negative for cold intolerance and heat intolerance.   Skin: Negative for rash.   Musculoskeletal: Negative for back pain.   Gastrointestinal: Negative for abdominal pain, constipation, diarrhea, heartburn, melena, nausea and vomiting.   Genitourinary: Negative for dysuria, flank pain and hematuria.   Neurological: Negative for dizziness.   Psychiatric/Behavioral: Negative for altered mental status and depression.         Past Medical History:   Diagnosis Date   • Angina    • ASTHMA    • Bronchitis    • Cardiac " arrhythmia    • Chickenpox    • Influenza    • Kidney, horseshoe    • Morrilton's syndrome    • Other acquired deformity of toe    • Personal history of venous thrombosis and embolism     states when she was little she had problems clotting   • Pneumonia    • PSVT (paroxysmal supraventricular tachycardia) (HCC)    • S/P ablation of ventricular arrhythmia     x6 (most recent was 2006)   • Ramirez-Parkinson-White syndrome    • WPW (Marisela-Parkinson-White syndrome)          Past Surgical History:   Procedure Laterality Date   • PELVISCOPY N/A 9/9/2018    Procedure: PELVISCOPY;  Surgeon: Dianne Martinez M.D.;  Location: SURGERY [facility];  Service: Gyn Robotic   • OOPHORECTOMY Left 9/9/2018    Procedure: SALPINGOOPHORECTOMY;  Surgeon: Dianne Martinez M.D.;  Location: SURGERY [facility];  Service: Gyn Robotic   • REPEAT C SECTION  6/18/2014    Performed by Corinne E Capurro, M.D. at LABOR AND DELIVERY   • GYN SURGERY  2007    csection   • OTHER  2003    L BREAST LUMPECTOMY   • BLANCA BY LAPAROSCOPY     • CHOLECYSTECTOMY     • OTHER      gallbladder removed   • OTHER      cardiac ablations x 6   • OTHER CARDIAC SURGERY      6 ablation   • PRIMARY C SECTION     • ZZZ IMPLANTABLE CARDIOVERTER DEFIBRILLATOR (ICD)           Current Facility-Administered Medications   Medication Dose Route Frequency Provider Last Rate Last Dose   • iohexol (OMNIPAQUE) 350 mg/mL  100 mL Intravenous Once Karyn Hill M.D.         Current Outpatient Medications   Medication Sig Dispense Refill   • ibuprofen (MOTRIN) 200 MG Tab Take 600 mg by mouth every 8 hours as needed.           Allergies   Allergen Reactions   • Inapsine [Droperidol] Anaphylaxis   • Keflex Rash and Swelling     RASH & SWELLING   • Morphine Unspecified     STOPS PEEING     • Penicillins Rash and Swelling     RASH & SWELLING         Family History   Problem Relation Age of Onset   • Asthma Mother    • Lung Disease Mother         PE   • Other Mother         clotting  "disease   • Thyroid Mother    • Lung Disease Son         sees pediatric pulmonologist   • Asthma Son    • Diabetes Father    • Thyroid Sister    • Diabetes Maternal Aunt    • Other Maternal Grandfather         CHF   • Diabetes Paternal Aunt    • Diabetes Paternal Grandmother    • Psychiatric Illness Paternal Grandmother         alzheimers         Social History     Socioeconomic History   • Marital status:      Spouse name: Not on file   • Number of children: Not on file   • Years of education: Not on file   • Highest education level: Not on file   Occupational History   • Not on file   Social Needs   • Financial resource strain: Not on file   • Food insecurity:     Worry: Not on file     Inability: Not on file   • Transportation needs:     Medical: Not on file     Non-medical: Not on file   Tobacco Use   • Smoking status: Never Smoker   • Smokeless tobacco: Never Used   Substance and Sexual Activity   • Alcohol use: No     Alcohol/week: 0.0 oz   • Drug use: No   • Sexual activity: Yes     Partners: Male     Birth control/protection: Surgical   Lifestyle   • Physical activity:     Days per week: Not on file     Minutes per session: Not on file   • Stress: Not on file   Relationships   • Social connections:     Talks on phone: Not on file     Gets together: Not on file     Attends [employer] service: Not on file     Active member of club or organization: Not on file     Attends meetings of clubs or organizations: Not on file     Relationship status: Not on file   • Intimate partner violence:     Fear of current or ex partner: Not on file     Emotionally abused: Not on file     Physically abused: Not on file     Forced sexual activity: Not on file   Other Topics Concern   • Not on file   Social History Narrative   • Not on file         Physical Exam:  Ambulatory Vitals  /57   Pulse 62   Temp 36.4 °C (97.5 °F) (Temporal)   Resp (!) 22   Ht 1.626 m (5' 4\")   Wt 85.1 kg (187 lb 9.8 oz)   SpO2 98%    BP " Readings from Last 4 Encounters:   09/23/19 100/57   06/24/19 100/56   05/06/19 108/50   03/31/19 120/78     Weight/BMI:   Vitals:    09/23/19 1630 09/23/19 1700 09/23/19 1730 09/23/19 1800   BP: 117/71 108/70 120/59 100/57   Weight:       Height:        Body mass index is 32.2 kg/m².  Wt Readings from Last 4 Encounters:   09/23/19 85.1 kg (187 lb 9.8 oz)   06/24/19 83.9 kg (185 lb)   05/06/19 83.5 kg (184 lb)   03/31/19 81.6 kg (180 lb)       Physical Exam   Constitutional: She is oriented to person, place, and time and well-developed, well-nourished, and in no distress. No distress.   HENT:   Head: Normocephalic and atraumatic.   Eyes: Pupils are equal, round, and reactive to light. Conjunctivae are normal.   Neck: Normal range of motion. Neck supple. No JVD present.   Cardiovascular: Normal rate, regular rhythm, normal heart sounds and intact distal pulses. Exam reveals no gallop and no friction rub.   No murmur heard.  Pulmonary/Chest: Effort normal and breath sounds normal. No respiratory distress. She has no wheezes. She has no rales. She exhibits no tenderness.   Abdominal: Soft. Bowel sounds are normal. She exhibits no distension.   Musculoskeletal: She exhibits no edema.   Neurological: She is alert and oriented to person, place, and time.   Skin: Skin is warm and dry.   Psychiatric: Affect and judgment normal.       Lab Data Review:  Lab Results   Component Value Date/Time    CHOLSTRLTOT 149 07/01/2017 09:04 AM    LDL 98 07/01/2017 09:04 AM    HDL 40 07/01/2017 09:04 AM    TRIGLYCERIDE 53 07/01/2017 09:04 AM       Lab Results   Component Value Date/Time    SODIUM 138 09/23/2019 03:05 PM    POTASSIUM 3.9 09/23/2019 03:05 PM    CHLORIDE 106 09/23/2019 03:05 PM    CO2 23 09/23/2019 03:05 PM    GLUCOSE 83 09/23/2019 03:05 PM    BUN 11 09/23/2019 03:05 PM    CREATININE 0.50 09/23/2019 03:05 PM    CREATININE 0.6 08/11/2008 06:45 AM     Estimated Creatinine Clearance: 156.4 mL/min (by C-G formula based on SCr of  0.5 mg/dL).  Lab Results   Component Value Date/Time    ALKPHOSPHAT 50 09/23/2019 03:05 PM    ASTSGOT 13 09/23/2019 03:05 PM    ALTSGPT 15 09/23/2019 03:05 PM    TBILIRUBIN 1.2 09/23/2019 03:05 PM      Lab Results   Component Value Date/Time    WBC 12.7 (H) 09/23/2019 03:05 PM     No results found for: HBA1C  No components found for: TROP    Labs reviewed. Troponin <6.    Cardiac Imaging and Procedures Review:    EKG reviewed. Sinus, RBBB, northwest axis likely lead placement as known to have LPFB, no acute st/t changes    No indication of arrhythmia on telemetry to date nor on captured EKG    TTE 11/2018  CONCLUSIONS  Normal left ventricular size and systolic function.  Left ventricular ejection fraction is visually estimated to be 65%.  Mild mitral regurgitation.  Right heart pressures are normal.  No prior study is available for comparison.     holter monitor 11/2018  CONCLUSION  1.  Normal sinus rhythm, average heart rate 68 bpm.  2.  Rare premature atrial complexes.  3.  Rare premature ventricular complexes.  4.  Diary not returned.    Medical Decision Making:  Problem List Items Addressed This Visit     Palpitations      Other Visit Diagnoses     Near syncope            39 y.o. 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    -previously similar complaints with outpatient cards, see holter above  -currently stable  -admit to tele for monitoring; *please check stat EKG if has tachycardia  -repeat TTE  -check one more troponin, will effectively be ruled out for acs if remains low  -if unstable with sustained arrythmia please call cardiology and prepare procainamide   -will discuss with EP. May require further ablation vs medical management with pacemaker support for history of tachy-james (per patient).    Discussed with Dr Hill in the ER. Discussed with radiology  reading room to look at prior CT chest for stigmata of Holland syndrome, but could not find.     It was my pleasure to meet with Ms. Gaspar.

## 2019-09-24 NOTE — DISCHARGE SUMMARY
Discharge Summary    CHIEF COMPLAINT ON ADMISSION  Chief Complaint   Patient presents with   • Palpitations   • Chest Pressure     x 4-5 nights        Reason for Admission  Chest Pain     Admission Date  9/23/2019    CODE STATUS  Full Code    HPI & HOSPITAL COURSE  This is a 39 y.o. female here with palpitations.  Patient has significant medical history of WPW syndrome with multiple ablations since childhood, factor V Leyden syndrome, angina, cardiac arrhythmias, history of DVT and embolism, PSVT.  Patient states she has been having palpitations on and off for the last 4 to 5 days.  Palpitations are at night while she is laying in bed and they were accompanied with chest pressure that was radiating up to her neck.  Palpitations was spontaneously resolved by morning.  However on day of admit patient noted chest pressure and radiation to her neck on the morning after and presented to the emergency room for further evaluation.  Troponins obtained in the emergency room were negative.  CT scan of the neck was negative.  Cardiology was consulted.  Patient was admitted to CDU for further work-up and monitoring.  Patient was placed on telemetry monitoring with no acute cardiac events noted.  D-dimer was obtained which was noted to be negative.  Echocardiogram was obtained which shows an EF 75% when compared to previous study shows no significant changes.  Cardiology evaluated patient and I recommending outpatient cardiac stress echo as well as 48-hour Holter monitoring and attempt to capture arrhythmia.  This will be arranged through cardiology office.  Patient has follow-up appointment with PCP as well as cardiology.  At this time patient states she feels she is ready for discharge.  Patient's vital signs are stable.  Patient is up ambulating independently.  Patient is maintaining oxygen saturations on room air.  Patient states she will follow-up with cardiology for continued testing and Holter monitoring.       Therefore,  she is discharged in good and stable condition to home with close outpatient follow-up.        Discharge Date  9/24/2019    FOLLOW UP ITEMS POST DISCHARGE  With Cardiology for outpatient stress echo and Holter Monitoring  Please follow up with PCP   Take all medications as prescribed     DISCHARGE DIAGNOSES  Principal Problem:    Palpitations POA: Yes  Active Problems:    Chest pain POA: Yes    WPW (Marisela-Parkinson-White syndrome) POA: Yes    Hypomagnesemia POA: Yes    Heterozygous factor V Leiden mutation (HCC) POA: Yes    Lorton syndrome POA: Yes  Resolved Problems:    * No resolved hospital problems. *      FOLLOW UP  Future Appointments   Date Time Provider Department Center   9/30/2019  1:40 PM [facility] [address]   10/10/2019  8:15 AM [facility] None       MEDICATIONS ON DISCHARGE     Medication List      CONTINUE taking these medications      Instructions   ibuprofen 200 MG Tabs  Commonly known as:  MOTRIN   Take 600 mg by mouth every 8 hours as needed.  Dose:  600 mg            Allergies  Allergies   Allergen Reactions   • Inapsine [Droperidol] Anaphylaxis   • Keflex Rash and Swelling     RASH & SWELLING   • Morphine Unspecified     STOPS PEEING     • Penicillins Rash and Swelling     RASH & SWELLING       DIET  Orders Placed This Encounter   Procedures   • Diet Order Cardiac     Standing Status:   Standing     Number of Occurrences:   1     Order Specific Question:   Diet:     Answer:   Cardiac [6]     Order Specific Question:   Miscellaneous modifications:     Answer:   No Decaf, No Caffeine(for test) [11]       ACTIVITY  As tolerated.  Weight bearing as tolerated    CONSULTATIONS  Cardiology     PROCEDURES  None     LABORATORY  Lab Results   Component Value Date    SODIUM 137 09/24/2019    POTASSIUM 4.3 09/24/2019    CHLORIDE 108 09/24/2019    CO2 20 09/24/2019    GLUCOSE 84 09/24/2019    BUN 10 09/24/2019    CREATININE 0.51 09/24/2019    CREATININE 0.6  08/11/2008        Lab Results   Component Value Date    WBC 9.8 09/24/2019    HEMOGLOBIN 14.2 09/24/2019    HEMATOCRIT 38.8 09/24/2019    PLATELETCT 251 09/24/2019

## 2019-09-24 NOTE — H&P
Hospital Medicine History & Physical Note    Date of Service  9/23/2019    Primary Care Physician  Federico Fritz, KALEIGH    Consultants  Cardiology    Code Status  Full    Chief Complaint  Palpitations    History of Presenting Illness  39 y.o. female who presented 9/23/2019 with palpitations for the past 4 to 5 days.  Patient states that at night when laying on bed she would feel the palpitations, as well as chest pressure coming up to her neck.  She could feel her heart beating fast and she would be unable to sleep.  This was spontaneous resolved by the morning.  However this morning she noted palpitations and chest pressure again with radiation to the neck.  EKG here shows no acute findings, troponin is negative.  CT scan of the neck was negative.  I ordered a d-dimer which is still pending.  Patient has history of WPW syndrome, with history of ablations since childhood.  She also was recently diagnosed with factor V Leiden.  She has had no history of blood clots in the past.  Cardiology has been consulted on the case.    Review of Systems  Review of Systems   Constitutional: Negative for chills, diaphoresis, fever and malaise/fatigue.   HENT: Negative for hearing loss and sore throat.    Eyes: Negative for blurred vision.   Respiratory: Negative for cough, shortness of breath and wheezing.    Cardiovascular: Positive for chest pain, palpitations and leg swelling.   Gastrointestinal: Negative for abdominal pain, diarrhea, heartburn, nausea and vomiting.   Genitourinary: Negative for dysuria, flank pain and hematuria.   Musculoskeletal: Negative for back pain and neck pain.   Skin: Negative for rash.   Neurological: Negative for dizziness, sensory change, speech change, focal weakness, weakness and headaches.   Psychiatric/Behavioral: The patient is nervous/anxious.        Past Medical History   has a past medical history of Angina, Arrhythmia, ASTHMA, Bronchitis, Cardiac arrhythmia, Chickenpox,  Influenza, Kidney, horseshoe, Cruz's syndrome, Other acquired deformity of toe, Personal history of venous thrombosis and embolism, Pneumonia, PSVT (paroxysmal supraventricular tachycardia) (HCC), S/P ablation of ventricular arrhythmia, Ramirez-Parkinson-White syndrome, and WPW (Marisela-Parkinson-White syndrome). She also has no past medical history of Backpain, CATARACT, COPD, Diabetes, or Dialysis.    Surgical History   has a past surgical history that includes zzz implantable cardioverter defibrillator (icd); qian by laparoscopy; other cardiac surgery; gyn surgery (2007); other (2003); other; repeat c section (6/18/2014); primary c section; cholecystectomy; other; pelviscopy (N/A, 9/9/2018); and oophorectomy (Left, 9/9/2018).     Family History  family history includes Asthma in her mother and son; Diabetes in her father, maternal aunt, paternal aunt, and paternal grandmother; Lung Disease in her mother and son; Other in her maternal grandfather and mother; Psychiatric Illness in her paternal grandmother; Thyroid in her mother and sister.     Social History   reports that she has never smoked. She has never used smokeless tobacco. She reports that she does not drink alcohol or use drugs.    Allergies  Allergies   Allergen Reactions   • Inapsine [Droperidol] Anaphylaxis   • Keflex Rash and Swelling     RASH & SWELLING   • Morphine Unspecified     STOPS PEEING     • Penicillins Rash and Swelling     RASH & SWELLING       Medications  Prior to Admission Medications   Prescriptions Last Dose Informant Patient Reported? Taking?   ibuprofen (MOTRIN) 200 MG Tab FEW DAYS AGO at PRN Patient Yes Yes   Sig: Take 600 mg by mouth every 8 hours as needed.      Facility-Administered Medications: None       Physical Exam  Temp:  [36.4 °C (97.5 °F)] 36.4 °C (97.5 °F)  Pulse:  [58-78] 73  Resp:  [15-98] 19  BP: (100-143)/(57-90) 109/57  SpO2:  [94 %-99 %] 94 %    Physical Exam   Constitutional: She is oriented to person, place, and  time. She appears well-developed and well-nourished.   HENT:   Head: Normocephalic and atraumatic.   Eyes: Pupils are equal, round, and reactive to light. Conjunctivae are normal.   Neck: No tracheal deviation present. No thyromegaly present.   Cardiovascular: Normal rate and regular rhythm.   Pulmonary/Chest: Effort normal and breath sounds normal.   Abdominal: Soft. Bowel sounds are normal. She exhibits no distension. There is no tenderness.   Musculoskeletal: She exhibits no edema.   Lymphadenopathy:     She has no cervical adenopathy.   Neurological: She is alert and oriented to person, place, and time. No cranial nerve deficit.   MMT 5/5   Skin: Skin is warm and dry.   Nursing note and vitals reviewed.      Laboratory:  Recent Labs     09/23/19  1505   WBC 12.7*   RBC 5.02   HEMOGLOBIN 14.9   HEMATOCRIT 42.8   MCV 85.3   MCH 29.7   MCHC 34.8   RDW 41.8   PLATELETCT 278   MPV 11.2     Recent Labs     09/23/19  1505   SODIUM 138   POTASSIUM 3.9   CHLORIDE 106   CO2 23   GLUCOSE 83   BUN 11   CREATININE 0.50   CALCIUM 9.7     Recent Labs     09/23/19  1505   ALTSGPT 15   ASTSGOT 13   ALKPHOSPHAT 50   TBILIRUBIN 1.2   GLUCOSE 83         No results for input(s): NTPROBNP in the last 72 hours.      Recent Labs     09/23/19  1505   TROPONINT <6       Urinalysis:    No results found     Imaging:  CT-CTA NECK WITH & W/O-POST PROCESSING   Final Result      CT angiogram of the neck within normal limits.      DX-CHEST-PORTABLE (1 VIEW)   Final Result      No acute cardiopulmonary abnormality.      EC-ECHOCARDIOGRAM COMPLETE W/O CONT    (Results Pending)         Assessment/Plan:  I anticipate this patient is appropriate for observation status at this time.    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

## 2019-09-24 NOTE — CARE PLAN
Problem: Hemodynamic Status  Goal: Stable Vital Signs and Fluid Balance  Outcome: PROGRESSING AS EXPECTED  Intervention: Vital Signs, Continuous Cardiac Monitoring, Pulse Oximetry. Monitor I & O, Manage IV fluids and infusions per orders.  Intervention: Position Patient for Maximum Circulation/Cardiac Output       Problem: Psychosocial Needs:  Goal: Level of anxiety will decrease  Outcome: PROGRESSING AS EXPECTED  Interventions: Identify and develop with patient strategies to cope with anxiety triggers. Allow for verbalization of concerns and feelings. Implement non-pharmacological interventions (rest, distraction, quiet environment, therapeutic listening) Promote sleep and relaxation.

## 2019-09-24 NOTE — PROGRESS NOTES
Patient resting/sleeping calmly at this time. SB/SR on cardiac monitor HR:55-60s, IVF infusing well. No signs of distress noted.

## 2019-09-24 NOTE — PROGRESS NOTES
AM labs drawn and sent to lab. Pt resting/sleeping calmly, easily arousable,  SB on cardiac monitor  HR:50s. No complaints at this time. No signs of distress. Will continue to monitor.

## 2019-09-24 NOTE — ED NOTES
Received report from Leighann COBURN    Patient ambulated steady gait to bathroom, friend at bedside, meal provided, updated on POC, sent labs.     Patient in NAD, on monitor, sinus rhythm, VSS    Patient denies any further needs at this time.

## 2019-09-24 NOTE — PROGRESS NOTES
Assessment completed. Pt A&Ox4. Respirations are even and unlabored on RA. Pt denies chest pain or palpitations at this time. Denies SOB. Monitors applied, VS stable, call light and belongings within reach. POC updated (echo results, cardiology consult). Pt educated on room and call light, pt verbalized understanding. Communication board updated. Needs met.

## 2019-09-24 NOTE — PROGRESS NOTES
Pt into unit from ED via gurney transported by this RN and accompanied by pt's sister, SR w/ RBBB on tele monitr HR:60s. Pt AOx4, ambulatory w/ steady gait. Denies palpitations or chest discomfort at the moment. Patient oriented to unit, room and BR location. Weight and VS taken. Attached to cardiac monitoring. Admit profile and initial assessment done. Plan of care reviewed w/ patient, verbalizes understanding. Safety and comfort measures provided. Fall precautions in place. Patient questions answered. Encouraged to verbalize feelings and needs. Call light within reach. Will continue to assess and monitor.

## 2019-09-25 LAB — EKG IMPRESSION: NORMAL

## 2019-09-30 ENCOUNTER — OFFICE VISIT (OUTPATIENT)
Dept: MEDICAL GROUP | Facility: PHYSICIAN GROUP | Age: 39
End: 2019-09-30
Payer: COMMERCIAL

## 2019-09-30 VITALS
BODY MASS INDEX: 31.58 KG/M2 | WEIGHT: 185 LBS | HEIGHT: 64 IN | HEART RATE: 63 BPM | TEMPERATURE: 97.9 F | SYSTOLIC BLOOD PRESSURE: 118 MMHG | OXYGEN SATURATION: 97 % | RESPIRATION RATE: 16 BRPM | DIASTOLIC BLOOD PRESSURE: 70 MMHG

## 2019-09-30 DIAGNOSIS — Z98.890 S/P ABLATION OF ACCESSORY BYPASS TRACT: ICD-10-CM

## 2019-09-30 DIAGNOSIS — R00.2 PALPITATIONS: ICD-10-CM

## 2019-09-30 DIAGNOSIS — I83.811 VARICOSE VEINS OF RIGHT LOWER EXTREMITY WITH PAIN: ICD-10-CM

## 2019-09-30 DIAGNOSIS — D68.51 HETEROZYGOUS FACTOR V LEIDEN MUTATION (HCC): ICD-10-CM

## 2019-09-30 DIAGNOSIS — R94.31 ABNORMAL EKG: ICD-10-CM

## 2019-09-30 DIAGNOSIS — I45.6 WPW (WOLFF-PARKINSON-WHITE SYNDROME): ICD-10-CM

## 2019-09-30 DIAGNOSIS — M79.661 PAIN AND SWELLING OF RIGHT LOWER LEG: ICD-10-CM

## 2019-09-30 DIAGNOSIS — M79.89 PAIN AND SWELLING OF RIGHT LOWER LEG: ICD-10-CM

## 2019-09-30 DIAGNOSIS — Z23 NEED FOR VACCINATION: ICD-10-CM

## 2019-09-30 PROCEDURE — 90686 IIV4 VACC NO PRSV 0.5 ML IM: CPT | Performed by: NURSE PRACTITIONER

## 2019-09-30 PROCEDURE — 90471 IMMUNIZATION ADMIN: CPT | Performed by: NURSE PRACTITIONER

## 2019-09-30 PROCEDURE — 99214 OFFICE O/P EST MOD 30 MIN: CPT | Mod: 25 | Performed by: NURSE PRACTITIONER

## 2019-09-30 ASSESSMENT — PATIENT HEALTH QUESTIONNAIRE - PHQ9: CLINICAL INTERPRETATION OF PHQ2 SCORE: 0

## 2019-09-30 NOTE — PROGRESS NOTES
Chief Complaint   Patient presents with   • Hospital Follow-up     Irregular Heart      HISTORY OF THE PRESENT ILLNESS: This is a 39 y.o. female established patient who presents today for follow up after being admitted to [facility] for heart palpitations 6 days ago. She notes that she was feeling unwell for 5 days before going to the hospital, and on the 5th day she began feeling like she was going to pass out so she drove herself to the ED. In the ED, her blood pressure and heart rate were coming back as irregular intermittently and she had some leg swelling so she was admitted. She had a full work up completed and was discharged home. She had 2 ECHO's performed which showed no significant change. They placed patient on a monitor and she is scheduled to do a stress test and follow up with Cardiology with Dr. De Jesus in 10 days. The patient notes increased fatigue, but she states she is feeling much better. She had a 5 minute episode last night of the same irregular heart beat but it spontaneously resolved and she notes this was a much less intense episode than it was before her hospital stay. Upon arrival, patient states that she has some mild right leg swelling with no other acute medical problems. The patient notes that the swelling is worse when she is standing all day which she attributes to intense activity levels. She has a history of varicose veins.    Immunizations  Patient has not received the influenza vaccine this season.    Past Medical History:   Diagnosis Date   • Angina    • Arrhythmia    • ASTHMA    • Bronchitis    • Cardiac arrhythmia    • Chickenpox    • Influenza    • Kidney, horseshoe    • Dovray's syndrome    • Other acquired deformity of toe    • Personal history of venous thrombosis and embolism     states when she was little she had problems clotting   • Pneumonia    • PSVT (paroxysmal supraventricular tachycardia) ([facility])    • S/P ablation of ventricular arrhythmia     x6 (most recent was  )   • Ramirez-Parkinson-White syndrome    • WPW (Marisela-Parkinson-White syndrome)      Past Surgical History:   Procedure Laterality Date   • PELVISCOPY N/A 2018    Procedure: PELVISCOPY;  Surgeon: Dianne Martinez M.D.;  Location: SURGERY [facility];  Service: Gyn Robotic   • OOPHORECTOMY Left 2018    Procedure: SALPINGOOPHORECTOMY;  Surgeon: Dianne Martinez M.D.;  Location: SURGERY [facility] [place];  Service: Gyn Robotic   • REPEAT C SECTION  2014    Performed by Corinne E Capurro, M.D. at LABOR AND DELIVERY   • GYN SURGERY      csection   • OTHER  2003    L BREAST LUMPECTOMY   • BLANCA BY LAPAROSCOPY     • CHOLECYSTECTOMY     • OTHER      gallbladder removed   • OTHER      cardiac ablations x 6   • OTHER CARDIAC SURGERY      6 ablation   • PRIMARY C SECTION     • ZZZ IMPLANTABLE CARDIOVERTER DEFIBRILLATOR (ICD)       Family Status   Relation Name Status   • Mo  Alive        obese   • Son  Alive   • Fa  Alive   • Sis  Alive   • Sis 6 months    • Son  Alive   • MAunt x2 Alive   • MGFa     • PAunt  (Not Specified)   • PGMo  (Not Specified)     Family History   Problem Relation Age of Onset   • Asthma Mother    • Lung Disease Mother         PE   • Other Mother         clotting disease   • Thyroid Mother    • Lung Disease Son         sees pediatric pulmonologist   • Asthma Son    • Diabetes Father    • Thyroid Sister    • Diabetes Maternal Aunt    • Other Maternal Grandfather         CHF   • Diabetes Paternal Aunt    • Diabetes Paternal Grandmother    • Psychiatric Illness Paternal Grandmother         alzheimers     Social History     Tobacco Use   • Smoking status: Never Smoker   • Smokeless tobacco: Never Used   Substance Use Topics   • Alcohol use: No     Alcohol/week: 0.0 oz   • Drug use: No     Allergies: Inapsine [droperidol]; Keflex; Morphine; and Penicillins    Current Outpatient Medications Ordered in Epic   Medication Sig Dispense Refill   • ibuprofen (MOTRIN) 200 MG Tab  "Take 600 mg by mouth every 8 hours as needed.       No current Epic-ordered facility-administered medications on file.      Review of Systems   Constitutional:  Negative for fever, chills, weight loss and malaise/fatigue.   HENT:  Negative for ear pain, nosebleeds, congestion, sore throat and neck pain.    Eyes:  Negative for blurred vision.   Respiratory:  Negative for cough, sputum production, shortness of breath and wheezing.    Cardiovascular:  Positive for leg swelling. Negative for chest pain, palpitations, and orthopnea Gastrointestinal:  Negative for heartburn, nausea, vomiting and abdominal pain.   Genitourinary:  Negative for dysuria, urgency and frequency.   Musculoskeletal:  Negative for myalgias, back pain and joint pain.   Skin:  Negative for rash and itching.   Neurological:  Negative for dizziness, tingling, tremors, sensory change, focal weakness and headaches.   Endo/Heme/Allergies:  Does not bruise/bleed easily.   Psychiatric/Behavioral:  Negative for depression, anxiety, or memory loss.     All other systems reviewed and are negative except as in HPI.    Exam: /70 (BP Location: Left arm, Patient Position: Sitting, BP Cuff Size: Adult)   Pulse 63   Temp 36.6 °C (97.9 °F) (Temporal)   Resp 16   Ht 1.626 m (5' 4\")   Wt 83.9 kg (185 lb)   SpO2 97%   General:  Normal appearing. No distress.  Pulmonary:  Clear to ausculation.  Normal effort. No rales, ronchi, or wheezing.  Cardiovascular:  Regular rate and rhythm without murmur. Carotid and radial pulses are intact and equal bilaterally. No tenderness or swelling noted to palpation on her leg but she did not remove her boot for me.  Neurologic:  Grossly nonfocal  Skin:  Warm and dry.  No obvious lesions.  Musculoskeletal:  Normal gait. No extremity cyanosis, clubbing, or edema.  Psych:  Normal mood and affect. Alert and oriented x3. Judgment and insight is normal.    Labs:    Admission on 09/23/2019, Discharged on 09/24/2019   Component " Date Value Ref Range Status   • Report 2019    Final                    Value:[facility]    Test Date:  2019  Pt Name:    EMILE HOLLINGSWORTH              Department: ER  MRN:        [id number]                      Room:  Gender:     Female                       Technician: [id number]  :        1980                   Requested By:ER TRIAGE PROTOCOL  Order #:    [id number]                    Reading MD: EMMANUEL MCKEON MD    Measurements  Intervals                                Axis  Rate:       72                           P:          40  UT:         184                          QRS:        -115  QRSD:       146                          T:          53  QT:         452  QTc:        495    Interpretive Statements  SINUS RHYTHM  RIGHT BUNDLE BRANCH BLOCK  Compared to ECG 2018 10:45:45  Left posterior fascicular block no longer present    Electronically Signed On 2019 15:23:56 PDT by EMMANUEL MCKEON MD     • WBC 2019 12.7* 4.8 - 10.8 K/uL Final   • RBC 2019 5.02  4.20 - 5.40 M/uL Final   • Hemoglobin 2019 14.9  12.0 - 16.0 g/dL Final   • Hematocrit 2019 42.8  37.0 - 47.0 % Final   • MCV 2019 85.3  81.4 - 97.8 fL Final   • MCH 2019 29.7  27.0 - 33.0 pg Final   • MCHC 2019 34.8  33.6 - 35.0 g/dL Final   • RDW 2019 41.8  35.9 - 50.0 fL Final   • Platelet Count 2019 278  164 - 446 K/uL Final   • MPV 2019 11.2  9.0 - 12.9 fL Final   • Neutrophils-Polys 2019 64.10  44.00 - 72.00 % Final   • Lymphocytes 2019 24.80  22.00 - 41.00 % Final   • Monocytes 2019 7.80  0.00 - 13.40 % Final   • Eosinophils 2019 1.60  0.00 - 6.90 % Final   • Basophils 2019 0.60  0.00 - 1.80 % Final   • Immature Granulocytes 2019 1.10* 0.00 - 0.90 % Final   • Nucleated RBC 2019 0.00  /100 WBC Final   • Neutrophils (Absolute) 2019 8.16* 2.00 - 7.15 K/uL Final    Includes immature  neutrophils, if present.   • Lymphs (Absolute) 09/23/2019 3.16  1.00 - 4.80 K/uL Final   • Monos (Absolute) 09/23/2019 0.99* 0.00 - 0.85 K/uL Final   • Eos (Absolute) 09/23/2019 0.20  0.00 - 0.51 K/uL Final   • Baso (Absolute) 09/23/2019 0.07  0.00 - 0.12 K/uL Final   • Immature Granulocytes (abs) 09/23/2019 0.14* 0.00 - 0.11 K/uL Final   • NRBC (Absolute) 09/23/2019 0.00  K/uL Final   • Sodium 09/23/2019 138  135 - 145 mmol/L Final   • Potassium 09/23/2019 3.9  3.6 - 5.5 mmol/L Final   • Chloride 09/23/2019 106  96 - 112 mmol/L Final   • Co2 09/23/2019 23  20 - 33 mmol/L Final   • Anion Gap 09/23/2019 9.0  0.0 - 11.9 Final   • Glucose 09/23/2019 83  65 - 99 mg/dL Final   • Bun 09/23/2019 11  8 - 22 mg/dL Final   • Creatinine 09/23/2019 0.50  0.50 - 1.40 mg/dL Final   • Calcium 09/23/2019 9.7  8.5 - 10.5 mg/dL Final   • AST(SGOT) 09/23/2019 13  12 - 45 U/L Final   • ALT(SGPT) 09/23/2019 15  2 - 50 U/L Final   • Alkaline Phosphatase 09/23/2019 50  30 - 99 U/L Final   • Total Bilirubin 09/23/2019 1.2  0.1 - 1.5 mg/dL Final   • Albumin 09/23/2019 4.6  3.2 - 4.9 g/dL Final   • Total Protein 09/23/2019 6.8  6.0 - 8.2 g/dL Final   • Globulin 09/23/2019 2.2  1.9 - 3.5 g/dL Final   • A-G Ratio 09/23/2019 2.1  g/dL Final   • Troponin T 09/23/2019 <6  6 - 19 ng/L Final    Comment: As of July 9th 2019 at 0900, the Troponin I test has been replaced with the  Ultra High Sensitivity (Gen 5) Troponin T test.  Please note new analyte,  methodology, and reference range.  The Ultra High Sensitivity Troponin T test has a reference range for  positive troponins that follows the recommendation of the American College  of Cardiology (ACC) committee in conjunction with the 99th percentile  reference population. Normal range: 6-19 ng/L     • Report 09/23/2019    Preliminary                    Value:[facility]    Test Date:  2019-09-23  Pt Name:    EMILE HOLLINGSWORTH              Department: ER  MRN:        [id number]                       Room:       T207  Gender:     Female                       Technician: DARWIN  :        1980                   Requested By:ER TRIAGE PROTOCOL  Order #:    [id number]                    Reading MD:    Measurements  Intervals                                Axis  Rate:       63                           P:          26  MS:         191                          QRS:        -116  QRSD:       162                          T:          58  QT:         454  QTc:        465    Interpretive Statements  SINUS RHYTHM  RIGHT BUNDLE BRANCH BLOCK  Compared to ECG 2019 13:11:52  No significant changes     • TSH 2019 1.300  0.380 - 5.330 uIU/mL Final    Comment: Please note new reference ranges effective 2017 10:00 AM  Pregnant Females, 1st Trimester  0.050-3.700  Pregnant Females, 2nd Trimester  0.310-4.350  Pregnant Females, 3rd Trimester  0.410-5.180     • GFR If  2019 >60  >60 mL/min/1.73 m 2 Final   • GFR If Non  2019 >60  >60 mL/min/1.73 m 2 Final   • Beta-Hcg Qualitative Serum 2019 Negative  Negative Final   • Magnesium 2019 1.8  1.5 - 2.5 mg/dL Final   • Phosphorus 2019 3.4  2.5 - 4.5 mg/dL Final   • D-Dimer Screen 2019 <0.40  0.00 - 0.50 ug/mL (FEU) Final    Comment: Effective 2018, please note new units of measure.  A negative D-Dimer result when combined with a clinical  assessment of low probability has been shown to have a high  negative predictive value for DVT or PE.  This assay should not be used independently to exclude or  diagnose DVT or Pulmonary Embolism.  This test methodology does not differentiate between DVT and  PE when an elevation in results is demonstrated.     • Report 2019    Final                    Value:[facility]    Test Date:  2019  Pt Name:    EMILE HOLLINGSWORTH              Department: ER  MRN:        [id number]                      Room:       T207  Gender:     Female                        Technician: DARWIN  :        1980                   Requested By:DENI MAGUIRELICHAHALIMA  Order #:    [id number]                    Reading MD: Sheldon Redmond MD    Measurements  Intervals                                Axis  Rate:       63                           P:          26  TN:         191                          QRS:        -116  QRSD:       162                          T:          58  QT:         454  QTc:        465    Interpretive Statements  SINUS RHYTHM  RIGHT BUNDLE BRANCH BLOCK  Compared to ECG 2019 13:11:52  No significant changes    Electronically Signed On 2019 16:02:17 PDT by Sheldon Redmond MD     • Troponin T 2019 <6  6 - 19 ng/L Final    Comment: As of 2019 at 0900, the Troponin I test has been replaced with the  Ultra High Sensitivity (Gen 5) Troponin T test.  Please note new analyte,  methodology, and reference range.  The Ultra High Sensitivity Troponin T test has a reference range for  positive troponins that follows the recommendation of the American College  of Cardiology (ACC) committee in conjunction with the 99th percentile  reference population. Normal range: 6-19 ng/L     • Eject.Frac. MOD BP 2019 67.65   Final   • Eject.Frac. MOD 4C 2019 67.97   Final   • Eject.Frac. MOD 2C 2019 66.4   Final   • Left Ventrical Ejection Fraction 2019 75   Final   • Troponin T 2019 <6  6 - 19 ng/L Final    Comment: As of 2019 at 0900, the Troponin I test has been replaced with the  Ultra High Sensitivity (Gen 5) Troponin T test.  Please note new analyte,  methodology, and reference range.  The Ultra High Sensitivity Troponin T test has a reference range for  positive troponins that follows the recommendation of the American College  of Cardiology (ACC) committee in conjunction with the 99th percentile  reference population. Normal range: 6-19 ng/L     • WBC 2019 9.8  4.8 - 10.8 K/uL Final   • RBC 2019 4.52  4.20 -  5.40 M/uL Final   • Hemoglobin 09/24/2019 14.2  12.0 - 16.0 g/dL Final   • Hematocrit 09/24/2019 38.8  37.0 - 47.0 % Final   • MCV 09/24/2019 85.8  81.4 - 97.8 fL Final   • MCH 09/24/2019 31.4  27.0 - 33.0 pg Final   • MCHC 09/24/2019 36.6* 33.6 - 35.0 g/dL Final   • RDW 09/24/2019 42.0  35.9 - 50.0 fL Final   • Platelet Count 09/24/2019 251  164 - 446 K/uL Final   • MPV 09/24/2019 11.2  9.0 - 12.9 fL Final   • Neutrophils-Polys 09/24/2019 55.50  44.00 - 72.00 % Final   • Lymphocytes 09/24/2019 29.70  22.00 - 41.00 % Final   • Monocytes 09/24/2019 10.50  0.00 - 13.40 % Final   • Eosinophils 09/24/2019 2.80  0.00 - 6.90 % Final   • Basophils 09/24/2019 0.60  0.00 - 1.80 % Final   • Immature Granulocytes 09/24/2019 0.90  0.00 - 0.90 % Final   • Nucleated RBC 09/24/2019 0.00  /100 WBC Final   • Neutrophils (Absolute) 09/24/2019 5.43  2.00 - 7.15 K/uL Final    Includes immature neutrophils, if present.   • Lymphs (Absolute) 09/24/2019 2.91  1.00 - 4.80 K/uL Final   • Monos (Absolute) 09/24/2019 1.03* 0.00 - 0.85 K/uL Final   • Eos (Absolute) 09/24/2019 0.27  0.00 - 0.51 K/uL Final   • Baso (Absolute) 09/24/2019 0.06  0.00 - 0.12 K/uL Final   • Immature Granulocytes (abs) 09/24/2019 0.09  0.00 - 0.11 K/uL Final   • NRBC (Absolute) 09/24/2019 0.00  K/uL Final   • Sodium 09/24/2019 137  135 - 145 mmol/L Final   • Potassium 09/24/2019 4.3  3.6 - 5.5 mmol/L Final   • Chloride 09/24/2019 108  96 - 112 mmol/L Final   • Co2 09/24/2019 20  20 - 33 mmol/L Final   • Anion Gap 09/24/2019 9.0  0.0 - 11.9 Final   • Glucose 09/24/2019 84  65 - 99 mg/dL Final   • Bun 09/24/2019 10  8 - 22 mg/dL Final   • Creatinine 09/24/2019 0.51  0.50 - 1.40 mg/dL Final   • Calcium 09/24/2019 8.8  8.5 - 10.5 mg/dL Final   • AST(SGOT) 09/24/2019 11* 12 - 45 U/L Final   • ALT(SGPT) 09/24/2019 11  2 - 50 U/L Final   • Alkaline Phosphatase 09/24/2019 45  30 - 99 U/L Final   • Total Bilirubin 09/24/2019 1.4  0.1 - 1.5 mg/dL Final   • Albumin 09/24/2019  3.9  3.2 - 4.9 g/dL Final   • Total Protein 09/24/2019 5.7* 6.0 - 8.2 g/dL Final   • Globulin 09/24/2019 1.8* 1.9 - 3.5 g/dL Final   • A-G Ratio 09/24/2019 2.2  g/dL Final   • PT 09/24/2019 15.3* 12.0 - 14.6 sec Final   • INR 09/24/2019 1.18* 0.87 - 1.13 Final    Comment: INR - Non-therapeutic Reference Range: 0.87-1.13  INR - Therapeutic Reference Range: 2.0-4.0     • APTT 09/24/2019 28.4  24.7 - 36.0 sec Final   • GFR If  09/24/2019 >60  >60 mL/min/1.73 m 2 Final   • GFR If Non  09/24/2019 >60  >60 mL/min/1.73 m 2 Final     PLAN:    1. Need for vaccination  Patient will receive the influenza vaccine in office.  - Influenza Vaccine Quad Injection (PF)    2. WPW (Marisela-Parkinson-White syndrome)  3. Palpitations  4. S/P ablation of accessory bypass tract  5. Heterozygous factor V Leiden mutation (HCC)  6. Abnormal EKG  7. Pain and swelling of right lower leg  Patient presents status post hospital admission for heart palpitations. She had a full workup done and was discharged home with follow ups with Cardiology for further management. We discussed lab results and the plan of care that she was given in the hospital. Upon arrival, patient still has some mild right leg swelling, which I will refer to Vascular and ensure an ultrasound is performed. She is currently experiencing no other acute medical problems. I discussed my concerns with placing patient on a diuretic because this may cause her blood pressure to become abnormal again.  Discussed importance of following up with cardiology.  - REFERRAL TO VASCULAR SURGERY  - US-EXTREMITY VENOUS LOWER UNILAT RIGHT; Future    8. Varicose veins of right lower extremity with pain  See #7.  - REFERRAL TO VASCULAR SURGERY  - US-EXTREMITY VENOUS LOWER UNILAT RIGHT; Future    Follow-up in 1 month or sooner as needed depending on ultrasound results. Patient is encouraged to be seen in the emergency room for chest pain, palpitations, shortness of  breath, dizziness, severe abdominal pain or other concerning symptoms.      Please note that this dictation was created using voice recognition software. I have made every reasonable attempt to correct obvious errors, but I expect that there are errors of grammar and possibly content that I did not discover before finalizing the note.    Assessment/Plan  1. Need for vaccination  Influenza Vaccine Quad Injection (PF)   2. WPW (Marisela-Parkinson-White syndrome)     3. Palpitations     4. S/P ablation of accessory bypass tract     5. Heterozygous factor V Leiden mutation (HCC)     6. Abnormal EKG     7. Pain and swelling of right lower leg  REFERRAL TO VASCULAR SURGERY    US-EXTREMITY VENOUS LOWER UNILAT RIGHT   8. Varicose veins of right lower extremity with pain  REFERRAL TO VASCULAR SURGERY    US-EXTREMITY VENOUS LOWER UNILAT RIGHT     I have placed the below orders and discussed them with an approved delegating provider. The MA is performing the below orders under the direction of Dr. Daly.    Tobias GUO (Scribe), am scribing for, and in the presence of, JUAREZ Gant    Electronically signed by: Tobias Greenfield (Scribe), 9/30/2019    Federico GUO APRN personally performed the services described in this documentation, as scribed by Tobias Greenfield in my presence, and it is both accurate and complete.

## 2019-10-07 ENCOUNTER — TELEPHONE (OUTPATIENT)
Dept: CARDIOLOGY | Facility: MEDICAL CENTER | Age: 39
End: 2019-10-07

## 2019-10-07 NOTE — TELEPHONE ENCOUNTER
APN recommendations relayed to schedulers.    -------------------  Message   Received: Today Message Contents   TAM Coto R.N.     Please schedule her for after her testing is done.  Thanks    Previous Messages      ----- Message -----   From: Joselyn Jackson R.N.   Sent: 10/2/2019   3:39 PM PDT   To: TAM Coto     Ok to [facility] on 10/10 until Stress Echo and Holter results are finalized?     Pt is not scheduled yet for Stress Echo and Holter scheduled 10/16.     Please advise, thank you!   ----- Message -----   From: Quita Obregon R.N.   Sent: 10/2/2019   3:29 PM PDT   To: Joselyn Jackson R.N.     Yes, or if you know her well and know what she would want. I would say postpone, but...   ----- Message -----   From: Joselyn Jackson R.N.   Sent: 10/2/2019   3:20 PM PDT   To: Quita Obregon R.N.     She is on vacation so we will not know until Monday when she is back.     Let me know if you want me to still relay it to her.     Thank you!     ----- Message -----   From: Quita Obregon R.N.   Sent: 10/2/2019   2:53 PM PDT   To: Joselyn Jackson R.N.      Can you ask Hung if she wants patient to postpone until after holter is complete?       ----- Message -----   From: Radha Avitia, Med Ass't   Sent: 10/1/2019   2:07 PM PDT   To: NAKUL Michelesome. I hope she calls back to schedule the Holter. I show they already had the Echo.     -Radha SAPP     ----- Message -----   From: Quita Obregon R.N.   Sent: 10/1/2019   9:08 AM PDT   To: Radha Avitia, Med Ass't     She read it just a few minutes after it was sent this morning...               10/1/19 8:49 AM   Good morning Heidi,     This message is to advise you that we have a referral to get you scheduled for a 48 Holter monitor. Please call us at [phone] to schedule the appointment, we are hoping to have it placed 48 hours before your appointment that you have with Hung Machado  on October 10, 2019.       Thank you,   [facility] Scheduling       Last read by Heiid Gaspar at 9:06 AM on 10/1/2019.     ----- Message -----   From: Radha Avitia, [facility]'t   Sent: 10/1/2019   8:50 AM PDT   To: Quita Obregon R.N.     We have called the patient multiple times and left message, I even sent a my chart message to have them call in to get this scheduled before the appointment they have on 10/10/19.     Thank you for your time,   Radha SAPP   ----- Message -----   From: Quita Obregon R.N.   Sent: 9/24/2019  11:28 AM PDT   To: [facility] Schedulers Pool         ----- Message -----   From: Elder Infante M.D.   Sent: 9/24/2019  11:02 AM PDT   To: Quita Obregon R.N.     Please schedule stress echo and holter x 48 hours prior to fu with .10/10   Dx: chest pain and palpitations.

## 2019-10-16 ENCOUNTER — NON-PROVIDER VISIT (OUTPATIENT)
Dept: CARDIOLOGY | Facility: MEDICAL CENTER | Age: 39
End: 2019-10-16
Payer: COMMERCIAL

## 2019-10-16 DIAGNOSIS — I49.1 PREMATURE ATRIAL COMPLEXES: ICD-10-CM

## 2019-10-16 DIAGNOSIS — R00.2 PALPITATIONS: ICD-10-CM

## 2019-10-16 PROCEDURE — 93224 XTRNL ECG REC UP TO 48 HRS: CPT | Performed by: INTERNAL MEDICINE

## 2019-10-18 LAB — EKG IMPRESSION: NORMAL

## 2019-10-21 ENCOUNTER — OFFICE VISIT (OUTPATIENT)
Dept: CARDIOLOGY | Facility: MEDICAL CENTER | Age: 39
End: 2019-10-21
Payer: COMMERCIAL

## 2019-10-21 VITALS
BODY MASS INDEX: 31.62 KG/M2 | OXYGEN SATURATION: 96 % | HEART RATE: 71 BPM | WEIGHT: 185.2 LBS | HEIGHT: 64 IN | DIASTOLIC BLOOD PRESSURE: 64 MMHG | SYSTOLIC BLOOD PRESSURE: 102 MMHG

## 2019-10-21 DIAGNOSIS — I49.3 PVCS (PREMATURE VENTRICULAR CONTRACTIONS): ICD-10-CM

## 2019-10-21 DIAGNOSIS — R00.2 PALPITATIONS: ICD-10-CM

## 2019-10-21 DIAGNOSIS — Z98.890 S/P ABLATION OF ACCESSORY BYPASS TRACT: ICD-10-CM

## 2019-10-21 DIAGNOSIS — I45.6 WPW (WOLFF-PARKINSON-WHITE SYNDROME): ICD-10-CM

## 2019-10-21 DIAGNOSIS — I45.10 RIGHT BUNDLE BRANCH BLOCK: ICD-10-CM

## 2019-10-21 PROCEDURE — 99214 OFFICE O/P EST MOD 30 MIN: CPT | Performed by: NURSE PRACTITIONER

## 2019-10-21 ASSESSMENT — ENCOUNTER SYMPTOMS
PND: 0
MYALGIAS: 0
WEAKNESS: 0
ABDOMINAL PAIN: 0
COUGH: 0
DIZZINESS: 0
PALPITATIONS: 1
SHORTNESS OF BREATH: 0
ORTHOPNEA: 0
CLAUDICATION: 0

## 2019-10-21 NOTE — PROGRESS NOTES
Chief Complaint   Patient presents with   • Palpitations       Subjective:   Heidi Gaspar is a 39 y.o. female who presents today for emergency room follow-up.  She presented with palpitations and a throbbing sensation in her neck.  She was evaluated and discharged.  Holter monitor and stress echo were ordered.    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  She previously had a loop recorder which determined that she was not having any further WPW.  He has a long-standing right bundle branch block.  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    She is complaining of palpitations where her heart was flipping or making a thumping sensation.  This is happening frequently at night and she found it uncomfortable.  She also had a throbbing sensation in her neck at this time.  CT of her neck was done in the hospital which was negative.    She has some minor swelling in her right leg along with a large varicose vein.  This is being worked up by other providers.    Past Medical History:   Diagnosis Date   • Angina    • Arrhythmia    • ASTHMA    • Bronchitis    • Cardiac arrhythmia    • Chickenpox    • Influenza    • Kidney, horseshoe    • Bourbonnais's syndrome    • Other acquired deformity of toe    • Personal history of venous thrombosis and embolism     states when she was little she had problems clotting   • Pneumonia    • PSVT (paroxysmal supraventricular tachycardia) ([facility])    • S/P ablation of ventricular arrhythmia     x6 (most recent was 2006)   • Ramirez-Parkinson-White syndrome    • WPW (Marisela-Parkinson-White syndrome)      Past Surgical History:   Procedure Laterality Date   • PELVISCOPY N/A 9/9/2018    Procedure: PELVISCOPY;  Surgeon: Dianne Martinez M.D.;  Location: SURGERY [facility];  Service: Gyn Robotic   • OOPHORECTOMY Left 9/9/2018    Procedure: SALPINGOOPHORECTOMY;  Surgeon: Dianne Martinez M.D.;  Location: SURGERY [facility];  Service: Gyn Robotic   • REPEAT C  SECTION  6/18/2014    Performed by Corinne E Capurro, M.D. at LABOR AND DELIVERY   • GYN SURGERY  2007    csection   • OTHER  2003    L BREAST LUMPECTOMY   • BLANCA BY LAPAROSCOPY     • CHOLECYSTECTOMY     • OTHER      gallbladder removed   • OTHER      cardiac ablations x 6   • OTHER CARDIAC SURGERY      6 ablation   • PRIMARY C SECTION     • ZZZ IMPLANTABLE CARDIOVERTER DEFIBRILLATOR (ICD)       Family History   Problem Relation Age of Onset   • Asthma Mother    • Lung Disease Mother         PE   • Other Mother         clotting disease   • Thyroid Mother    • Lung Disease Son         sees pediatric pulmonologist   • Asthma Son    • Diabetes Father    • Thyroid Sister    • Diabetes Maternal Aunt    • Other Maternal Grandfather         CHF   • Diabetes Paternal Aunt    • Diabetes Paternal Grandmother    • Psychiatric Illness Paternal Grandmother         alzheimers     Social History     Socioeconomic History   • Marital status:      Spouse name: Not on file   • Number of children: Not on file   • Years of education: Not on file   • Highest education level: Not on file   Occupational History   • Not on file   Social Needs   • Financial resource strain: Not on file   • Food insecurity:     Worry: Not on file     Inability: Not on file   • Transportation needs:     Medical: Not on file     Non-medical: Not on file   Tobacco Use   • Smoking status: Never Smoker   • Smokeless tobacco: Never Used   Substance and Sexual Activity   • Alcohol use: No     Alcohol/week: 0.0 oz   • Drug use: No   • Sexual activity: Yes     Partners: Male     Birth control/protection: Surgical   Lifestyle   • Physical activity:     Days per week: Not on file     Minutes per session: Not on file   • Stress: Not on file   Relationships   • Social connections:     Talks on phone: Not on file     Gets together: Not on file     Attends L[employer] service: Not on file     Active member of club or organization: Not on file     Attends meetings of  "clubs or organizations: Not on file     Relationship status: Not on file   • Intimate partner violence:     Fear of current or ex partner: Not on file     Emotionally abused: Not on file     Physically abused: Not on file     Forced sexual activity: Not on file   Other Topics Concern   • Not on file   Social History Narrative   • Not on file     Allergies   Allergen Reactions   • Inapsine [Droperidol] Anaphylaxis   • Keflex Rash and Swelling     RASH & SWELLING   • Morphine Unspecified     STOPS PEEING     • Penicillins Rash and Swelling     RASH & SWELLING     Outpatient Encounter Medications as of 10/21/2019   Medication Sig Dispense Refill   • ibuprofen (MOTRIN) 200 MG Tab Take 600 mg by mouth every 8 hours as needed.       No facility-administered encounter medications on file as of 10/21/2019.      Review of Systems   Constitutional: Negative for malaise/fatigue.   Respiratory: Negative for cough and shortness of breath.    Cardiovascular: Positive for palpitations (heart flipping or thumping.) and leg swelling (right ankle.). Negative for chest pain, orthopnea, claudication and PND.   Gastrointestinal: Negative for abdominal pain.   Musculoskeletal: Negative for myalgias.   Neurological: Negative for dizziness and weakness.        Objective:   /64 (BP Location: Left arm, Patient Position: Sitting, BP Cuff Size: Adult)   Pulse 71   Ht 1.626 m (5' 4\")   Wt 84 kg (185 lb 3.2 oz)   LMP 09/22/2019 (Exact Date)   SpO2 96%   BMI 31.79 kg/m²     Physical Exam   Constitutional: She is oriented to person, place, and time. She appears well-developed and well-nourished.   Slightly anxious female in no acute distress.   HENT:   Head: Normocephalic.   Eyes: EOM are normal.   Neck: No JVD present.   Cardiovascular: Normal rate, regular rhythm and normal heart sounds.  Occasional extrasystoles are present.   Pulmonary/Chest: Effort normal and breath sounds normal.   Abdominal: Soft. Bowel sounds are normal. "   Musculoskeletal: She exhibits edema (Trace right ankle edema this morning.).   Neurological: She is alert and oriented to person, place, and time.   Skin: Skin is warm and dry.   Psychiatric: She has a normal mood and affect.     Results for EMILE HOLLINGSWORTH (MRN [id number])    Ref. Range 9/24/2019 02:45   WBC Latest Ref Range: 4.8 - 10.8 K/uL 9.8   RBC Latest Ref Range: 4.20 - 5.40 M/uL 4.52   Hemoglobin Latest Ref Range: 12.0 - 16.0 g/dL 14.2   Hematocrit Latest Ref Range: 37.0 - 47.0 % 38.8   MCV Latest Ref Range: 81.4 - 97.8 fL 85.8   MCH Latest Ref Range: 27.0 - 33.0 pg 31.4   MCHC Latest Ref Range: 33.6 - 35.0 g/dL 36.6 (H)   RDW Latest Ref Range: 35.9 - 50.0 fL 42.0   Platelet Count Latest Ref Range: 164 - 446 K/uL 251   MPV Latest Ref Range: 9.0 - 12.9 fL 11.2   Neutrophils-Polys Latest Ref Range: 44.00 - 72.00 % 55.50   Neutrophils (Absolute) Latest Ref Range: 2.00 - 7.15 K/uL 5.43   Lymphocytes Latest Ref Range: 22.00 - 41.00 % 29.70   Lymphs (Absolute) Latest Ref Range: 1.00 - 4.80 K/uL 2.91   Monocytes Latest Ref Range: 0.00 - 13.40 % 10.50   Monos (Absolute) Latest Ref Range: 0.00 - 0.85 K/uL 1.03 (H)   Eosinophils Latest Ref Range: 0.00 - 6.90 % 2.80   Eos (Absolute) Latest Ref Range: 0.00 - 0.51 K/uL 0.27   Basophils Latest Ref Range: 0.00 - 1.80 % 0.60   Baso (Absolute) Latest Ref Range: 0.00 - 0.12 K/uL 0.06   Immature Granulocytes Latest Ref Range: 0.00 - 0.90 % 0.90   Immature Granulocytes (abs) Latest Ref Range: 0.00 - 0.11 K/uL 0.09   Nucleated RBC Latest Units: /100 WBC 0.00   NRBC (Absolute) Latest Units: K/uL 0.00   Sodium Latest Ref Range: 135 - 145 mmol/L 137   Potassium Latest Ref Range: 3.6 - 5.5 mmol/L 4.3   Chloride Latest Ref Range: 96 - 112 mmol/L 108   Co2 Latest Ref Range: 20 - 33 mmol/L 20   Anion Gap Latest Ref Range: 0.0 - 11.9  9.0   Glucose Latest Ref Range: 65 - 99 mg/dL 84   Bun Latest Ref Range: 8 - 22 mg/dL 10   Creatinine Latest Ref Range: 0.50 - 1.40 mg/dL 0.51   GFR If   Latest Ref Range: >60 mL/min/1.73 m 2 >60   GFR If Non  Latest Ref Range: >60 mL/min/1.73 m 2 >60   Calcium Latest Ref Range: 8.5 - 10.5 mg/dL 8.8   AST(SGOT) Latest Ref Range: 12 - 45 U/L 11 (L)   ALT(SGPT) Latest Ref Range: 2 - 50 U/L 11   Alkaline Phosphatase Latest Ref Range: 30 - 99 U/L 45   Total Bilirubin Latest Ref Range: 0.1 - 1.5 mg/dL 1.4   Albumin Latest Ref Range: 3.2 - 4.9 g/dL 3.9   Total Protein Latest Ref Range: 6.0 - 8.2 g/dL 5.7 (L)   Globulin Latest Ref Range: 1.9 - 3.5 g/dL 1.8 (L)   A-G Ratio Latest Units: g/dL 2.2   Troponin T Latest Ref Range: 6 - 19 ng/L <6   INR Latest Ref Range: 0.87 - 1.13  1.18 (H)   PT Latest Ref Range: 12.0 - 14.6 sec 15.3 (H)   APTT Latest Ref Range: 24.7 - 36.0 sec 28.4       September 24, 2019: Transthoracic Echo Report  CONCLUSIONS  Normal left ventricular chamber size.  Normal left ventricular wall thickness.  Normal left ventricular systolic function.  Left ventricular ejection fraction is visually estimated to be 75%.  Normal regional wall motion.  Structurally normal aortic valve without significant stenosis or   regurgitation.  Trace mitral regurgitation.  No tricuspid stenosis or regurgitation.  Normal inferior vena cava size and inspiratory collapse.  Unable to estimate pulmonary artery pressure due to an inadequate   tricuspid regurgitant jet.  Compared to the report of the study done 11/2018 - there has been no   significant change.       October 16, 2019:   CONCLUSION   1.  Normal sinus rhythm, average heart rate 76 bpm.   2.  Rare premature ventricular complexes.   3.  Rare premature atrial complexes.   4.  2 symptomatic events recorded were associated with sinus rhythm with isolated premature atrial complex around the time of the symptoms occurred; the vast majority of arrhythmias were asymptomatic.     Stress echo was ordered but not done yet.    Assessment:     1. Palpitations     2. WPW (Marisela-Parkinson-White  syndrome)     3. S/P ablation of accessory bypass tract     4. PVCs (premature ventricular contractions)     5. Right bundle branch block         Medical Decision Making:  Today's Assessment / Status / Plan:   Palpitations: Her Holter monitor showed that she has some frequent PVCs, bigeminy and PACs.  No Marisela-Parkinson-White per Holter monitor.  She is reassured that these rhythms are benign.  She is symptomatic with this but her blood pressure is too low to treat her ectopy.    She did have some chest discomfort therefore she will undergo a stress echo, we will get this scheduled.    WPW: History of multiple ablations: Per patient report loop recorder which has been explanted did not show any further WPW.    Right bundle branch block: Long-standing.    She is reassured that her palpitations are probably due to ectopy and are benign.  She felt lightheaded during her palpitations probably due to low blood pressure.  I have encouraged her to increase sodium intake in her diet.    She will follow-up in 6 months with Dr Rodriguez, she will follow-up sooner if problems.    Collaborating Provider: Dr. Vicente.    Please note that this dictation was created using voice recognition software. I have made every reasonable attempt to correct obvious errors, but it is possible there are errors of grammar and possibly content that I did not discover before finalizing the note.

## 2019-11-13 ENCOUNTER — OFFICE VISIT (OUTPATIENT)
Dept: MEDICAL GROUP | Facility: PHYSICIAN GROUP | Age: 39
End: 2019-11-13
Payer: COMMERCIAL

## 2019-11-13 ENCOUNTER — HOSPITAL ENCOUNTER (OUTPATIENT)
Facility: MEDICAL CENTER | Age: 39
End: 2019-11-13
Attending: NURSE PRACTITIONER
Payer: COMMERCIAL

## 2019-11-13 ENCOUNTER — HOSPITAL ENCOUNTER (OUTPATIENT)
Dept: LAB | Facility: MEDICAL CENTER | Age: 39
End: 2019-11-13
Attending: NURSE PRACTITIONER
Payer: COMMERCIAL

## 2019-11-13 VITALS
BODY MASS INDEX: 31.07 KG/M2 | OXYGEN SATURATION: 98 % | HEART RATE: 74 BPM | TEMPERATURE: 97.8 F | RESPIRATION RATE: 16 BRPM | HEIGHT: 64 IN | WEIGHT: 182 LBS | DIASTOLIC BLOOD PRESSURE: 82 MMHG | SYSTOLIC BLOOD PRESSURE: 118 MMHG

## 2019-11-13 DIAGNOSIS — D17.1 LIPOMA OF TORSO: ICD-10-CM

## 2019-11-13 DIAGNOSIS — R30.0 DYSURIA: ICD-10-CM

## 2019-11-13 DIAGNOSIS — R07.81 RIB PAIN: ICD-10-CM

## 2019-11-13 DIAGNOSIS — R82.998 DARK BROWN URINE: ICD-10-CM

## 2019-11-13 LAB
ALBUMIN SERPL BCP-MCNC: 4.7 G/DL (ref 3.2–4.9)
ALBUMIN/GLOB SERPL: 2 G/DL
ALP SERPL-CCNC: 50 U/L (ref 30–99)
ALT SERPL-CCNC: 13 U/L (ref 2–50)
ANION GAP SERPL CALC-SCNC: 9 MMOL/L (ref 0–11.9)
APPEARANCE UR: NORMAL
AST SERPL-CCNC: 10 U/L (ref 12–45)
BILIRUB SERPL-MCNC: 1.7 MG/DL (ref 0.1–1.5)
BILIRUB UR STRIP-MCNC: NORMAL MG/DL
BUN SERPL-MCNC: 14 MG/DL (ref 8–22)
CALCIUM SERPL-MCNC: 9.6 MG/DL (ref 8.5–10.5)
CHLORIDE SERPL-SCNC: 104 MMOL/L (ref 96–112)
CO2 SERPL-SCNC: 25 MMOL/L (ref 20–33)
COLOR UR AUTO: NORMAL
CREAT SERPL-MCNC: 0.46 MG/DL (ref 0.5–1.4)
GLOBULIN SER CALC-MCNC: 2.4 G/DL (ref 1.9–3.5)
GLUCOSE SERPL-MCNC: 79 MG/DL (ref 65–99)
GLUCOSE UR STRIP.AUTO-MCNC: NORMAL MG/DL
KETONES UR STRIP.AUTO-MCNC: NORMAL MG/DL
LEUKOCYTE ESTERASE UR QL STRIP.AUTO: NORMAL
NITRITE UR QL STRIP.AUTO: NORMAL
PH UR STRIP.AUTO: 5.5 [PH] (ref 5–8)
POTASSIUM SERPL-SCNC: 4.3 MMOL/L (ref 3.6–5.5)
PROT SERPL-MCNC: 7.1 G/DL (ref 6–8.2)
PROT UR QL STRIP: NORMAL MG/DL
RBC UR QL AUTO: NORMAL
SODIUM SERPL-SCNC: 138 MMOL/L (ref 135–145)
SP GR UR STRIP.AUTO: 1.02
UROBILINOGEN UR STRIP-MCNC: 0.2 MG/DL

## 2019-11-13 PROCEDURE — 87086 URINE CULTURE/COLONY COUNT: CPT

## 2019-11-13 PROCEDURE — 80053 COMPREHEN METABOLIC PANEL: CPT

## 2019-11-13 PROCEDURE — 81002 URINALYSIS NONAUTO W/O SCOPE: CPT | Performed by: NURSE PRACTITIONER

## 2019-11-13 PROCEDURE — 36415 COLL VENOUS BLD VENIPUNCTURE: CPT

## 2019-11-13 PROCEDURE — 99214 OFFICE O/P EST MOD 30 MIN: CPT | Performed by: NURSE PRACTITIONER

## 2019-11-13 NOTE — PROGRESS NOTES
Chief Complaint   Patient presents with   • Rib Pain     getting worse; Started on R now on L; px 6    • Sleep Problem     because of the pain    • Burn     while peeing x 3 days        HISTORY OF THE PRESENT ILLNESS: This is a 39 y.o. female established patient who presents today for follow up.      This is a relatively chronic history of right rib pain. She sustained a rib fracture on the right side in her 20s.  Within the past few months, she has felt a constant, achy pain on her right and left ribs. It is exacerbated by palpation. She denies any right upper quadrant pain,chest pain, spine pain, rash, upper extremity pain, or difficulty breathing.  Dates she has not found anything that makes it specifically better or worse.  States the pain is constant.    This is a new history of a soft mass on her upper back. Her  was massaging her back to help alleviate her rib pain when he felt a bump deep in her back.  She states that this has been difficult to find at times, states that it is mobile.  States it has not gotten bigger since she noticed it.  She notices slight intermittent pain in the area.  Patient states that she will discuss this with dermatology.    This is a new history of dysuria. She has been experiencing mild dysuria for the past two to three days. She notes she drinks two to three large Hydroflasks of water. She denies any abnormal vaginal discharge or itching in the area. She reports a past diagnosis of kidney disease, a UTI, and a cyst in her left ovary which she then had removed in September of 2018.  States that she has not had any burning chest discomfort and has noticed a change in the color of her urine.    Past Medical History:   Diagnosis Date   • Angina    • Arrhythmia    • ASTHMA    • Bronchitis    • Cardiac arrhythmia    • Chickenpox    • Influenza    • Kidney, horseshoe    • Cruz's syndrome    • Other acquired deformity of toe    • Personal history of venous thrombosis and  embolism     states when she was little she had problems clotting   • Pneumonia    • PSVT (paroxysmal supraventricular tachycardia) ([facility])    • S/P ablation of ventricular arrhythmia     x6 (most recent was )   • Ramirez-Parkinson-White syndrome    • WPW (Marisela-Parkinson-White syndrome)        Past Surgical History:   Procedure Laterality Date   • PELVISCOPY N/A 2018    Procedure: PELVISCOPY;  Surgeon: Dianne Martinez M.D.;  Location: SURGERY [facility];  Service: Gyn Robotic   • OOPHORECTOMY Left 2018    Procedure: SALPINGOOPHORECTOMY;  Surgeon: Dianne Martinez M.D.;  Location: SURGERY [facility];  Service: Gyn Robotic   • REPEAT C SECTION  2014    Performed by Corinne E Capurro, M.D. at LABOR AND DELIVERY   • GYN SURGERY      csection   • OTHER  2003    L BREAST LUMPECTOMY   • BLANCA BY LAPAROSCOPY     • CHOLECYSTECTOMY     • OTHER      gallbladder removed   • OTHER      cardiac ablations x 6   • OTHER CARDIAC SURGERY      6 ablation   • PRIMARY C SECTION     • ZZZ IMPLANTABLE CARDIOVERTER DEFIBRILLATOR (ICD)         Family Status   Relation Name Status   • Mo  Alive        obese   • Son  Alive   • Fa  Alive   • Sis  Alive   • Sis 6 months    • Son  Alive   • MAunt x2 Alive   • MGFa     • PAunt  (Not Specified)   • PGMo  (Not Specified)     Family History   Problem Relation Age of Onset   • Asthma Mother    • Lung Disease Mother         PE   • Other Mother         clotting disease   • Thyroid Mother    • Lung Disease Son         sees pediatric pulmonologist   • Asthma Son    • Diabetes Father    • Thyroid Sister    • Diabetes Maternal Aunt    • Other Maternal Grandfather         CHF   • Diabetes Paternal Aunt    • Diabetes Paternal Grandmother    • Psychiatric Illness Paternal Grandmother         alzheimers       Social History     Tobacco Use   • Smoking status: Never Smoker   • Smokeless tobacco: Never Used   Substance Use Topics   • Alcohol use: No     Alcohol/week: 0.0  "oz   • Drug use: No       Allergies: Inapsine [droperidol]; Keflex; Morphine; and Penicillins    Current Outpatient Medications Ordered in Epic   Medication Sig Dispense Refill   • ibuprofen (MOTRIN) 200 MG Tab Take 600 mg by mouth every 8 hours as needed.       No current Epic-ordered facility-administered medications on file.        Review of Systems   Constitutional: Negative for fever, chills, weight loss and malaise/fatigue.   HENT: Negative for ear pain, nosebleeds, congestion, sore throat and neck pain.    Eyes: Negative for blurred vision.   Respiratory: Negative for cough, sputum production, shortness of breath and wheezing.    Cardiovascular: Negative for chest pain, palpitations, orthopnea and leg swelling.   Gastrointestinal: Negative for heartburn, nausea, vomiting and abdominal pain.   Genitourinary: Mild dysuria. Negative for urgency and frequency. Negative for discharge or itchiness.    Musculoskeletal: Bilateral rib pain. Negative for myalgias and joint pain.   Skin: Torso lipoma. Negative for rash and itching.   Neurological: Negative for dizziness, tingling, tremors, sensory change, focal weakness and headaches.   Endo/Heme/Allergies: Does not bruise/bleed easily.   Psychiatric/Behavioral: Negative for depression, anxiety, or memory loss.     All other systems reviewed and are negative except as in HPI.    Exam: /82 (BP Location: Left arm, Patient Position: Sitting, BP Cuff Size: Adult)   Pulse 74   Temp 36.6 °C (97.8 °F) (Temporal)   Resp 16   Ht 1.626 m (5' 4\")   Wt 82.6 kg (182 lb)   SpO2 98%   General:  Normal appearing. No distress.  Pulmonary:  Clear to ausculation.  Normal effort. No rales, ronchi, or wheezing.  Cardiovascular:  Regular rate and rhythm without murmur. Carotid and radial pulses are intact and equal bilaterally.  Liver and spleen are not palpable  Neurologic:  Grossly nonfocal  Lymph: No cervical, supraclavicular or axillary lymph nodes are palpable  Skin:  Warm " and dry.  No obvious lesions.  Musculoskeletal: Severe tenderness from her mid ribs to the base of her ribs bilaterally.  Soft mobile oblong mass of approximately 4 cm on patient left upper back.  Normal gait. No extremity cyanosis, clubbing, or edema.  Psych:  Normal mood and affect. Alert and oriented x3. Judgment and insight is normal.      PLAN:    1. Lipoma of torso  I informed the patient the bump appears to be a lipoma.  Patient will keep her follow-up appointment with her dermatologist.  States that she is unlikely to have this removed.  2. Rib pain  I informed the patient we will order a DX bilateral ribs to assess her rib pain and determine if there is a fracture or any other abnormality.  Discussed that this is him likely considering symptoms discussed some concern for kidney issue.  - [id number] (WITH 1-VIEW CXR); Future    3. Dysuria  4. Dark brown urine  Her urinalysis came back unremarkable, but brown in color.  There is little evidence of UTI.   I will have her do a CMP to assess her kidney function. I will send the urine for a culture to assure that a bacteria is not causing the dysuria.   - Comp Metabolic Panel; Future  - POCT Urinalysis   -Urine culture  -Microscopic urinalysis    Follow-up depending on results. Patient is encouraged to be seen in the emergency room for chest pain, palpitations, shortness of breath, dizziness, severe abdominal pain or other concerning symptoms.    Please note that this dictation was created using voice recognition software. I have made every reasonable attempt to correct obvious errors, but I expect that there are errors of grammar and possibly content that I did not discover before finalizing the note.      Assessment/Plan  1. Lipoma of torso     2. Rib pain  [id number] (WITH 1-VIEW CXR)   3. Dysuria  POCT Urinalysis   4. Dark brown urine  Comp Metabolic Panel     I have placed the below orders and discussed them with an approved delegating  provider. The MA is performing the below orders under the direction of Manuel Baeza (Scribe), am scribing for, and in the presence of, JUAREZ Gant    Electronically signed by: Manuel Francois (Scribe), 11/13/2019    Federico GUO APRN personally performed the services described in this documentation, as scribed by Manuel Francois in my presence, and it is both accurate and complete.

## 2019-11-14 ENCOUNTER — HOSPITAL ENCOUNTER (OUTPATIENT)
Dept: RADIOLOGY | Facility: MEDICAL CENTER | Age: 39
End: 2019-11-14
Attending: NURSE PRACTITIONER
Payer: COMMERCIAL

## 2019-11-14 DIAGNOSIS — R07.81 RIB PAIN: ICD-10-CM

## 2019-11-14 PROCEDURE — 71111 X-RAY EXAM RIBS/CHEST4/> VWS: CPT

## 2019-11-15 LAB
BACTERIA UR CULT: NORMAL
SIGNIFICANT IND 70042: NORMAL
SITE SITE: NORMAL
SOURCE SOURCE: NORMAL

## 2019-11-21 ENCOUNTER — HOSPITAL ENCOUNTER (OUTPATIENT)
Dept: RADIOLOGY | Facility: MEDICAL CENTER | Age: 39
End: 2019-11-21
Attending: NURSE PRACTITIONER
Payer: COMMERCIAL

## 2019-11-21 ENCOUNTER — HOSPITAL ENCOUNTER (OUTPATIENT)
Dept: CARDIOLOGY | Facility: MEDICAL CENTER | Age: 39
End: 2019-11-21
Attending: INTERNAL MEDICINE
Payer: COMMERCIAL

## 2019-11-21 DIAGNOSIS — I83.811 VARICOSE VEINS OF RIGHT LOWER EXTREMITY WITH PAIN: ICD-10-CM

## 2019-11-21 DIAGNOSIS — M79.89 PAIN AND SWELLING OF RIGHT LOWER LEG: ICD-10-CM

## 2019-11-21 DIAGNOSIS — M79.661 PAIN AND SWELLING OF RIGHT LOWER LEG: ICD-10-CM

## 2019-11-21 DIAGNOSIS — R07.89 CHEST DISCOMFORT: ICD-10-CM

## 2019-11-21 LAB — LV EJECT FRACT  99904: 55

## 2019-11-21 PROCEDURE — 93018 CV STRESS TEST I&R ONLY: CPT | Performed by: INTERNAL MEDICINE

## 2019-11-21 PROCEDURE — 93971 EXTREMITY STUDY: CPT | Mod: RT

## 2019-11-21 PROCEDURE — 93350 STRESS TTE ONLY: CPT | Mod: 26 | Performed by: INTERNAL MEDICINE

## 2019-11-21 PROCEDURE — 93971 EXTREMITY STUDY: CPT | Mod: 26,RT | Performed by: INTERNAL MEDICINE

## 2019-11-21 PROCEDURE — 93017 CV STRESS TEST TRACING ONLY: CPT

## 2019-12-18 ENCOUNTER — OFFICE VISIT (OUTPATIENT)
Dept: URGENT CARE | Facility: PHYSICIAN GROUP | Age: 39
End: 2019-12-18
Payer: COMMERCIAL

## 2019-12-18 VITALS
OXYGEN SATURATION: 96 % | HEART RATE: 73 BPM | DIASTOLIC BLOOD PRESSURE: 76 MMHG | SYSTOLIC BLOOD PRESSURE: 104 MMHG | BODY MASS INDEX: 31.07 KG/M2 | WEIGHT: 182 LBS | TEMPERATURE: 97.2 F | HEIGHT: 64 IN

## 2019-12-18 DIAGNOSIS — J02.9 PHARYNGITIS, UNSPECIFIED ETIOLOGY: ICD-10-CM

## 2019-12-18 DIAGNOSIS — J01.00 ACUTE NON-RECURRENT MAXILLARY SINUSITIS: ICD-10-CM

## 2019-12-18 DIAGNOSIS — J10.1 INFLUENZA B: ICD-10-CM

## 2019-12-18 LAB
INT CON NEG: NEGATIVE
INT CON POS: POSITIVE
S PYO AG THROAT QL: NEGATIVE

## 2019-12-18 PROCEDURE — 87880 STREP A ASSAY W/OPTIC: CPT | Performed by: NURSE PRACTITIONER

## 2019-12-18 PROCEDURE — 99214 OFFICE O/P EST MOD 30 MIN: CPT | Performed by: NURSE PRACTITIONER

## 2019-12-18 RX ORDER — METHYLPREDNISOLONE 4 MG/1
TABLET ORAL
Qty: 21 TAB | Refills: 0 | Status: SHIPPED | OUTPATIENT
Start: 2019-12-18 | End: 2020-02-11

## 2019-12-18 RX ORDER — DOXYCYCLINE 100 MG/1
100 CAPSULE ORAL 2 TIMES DAILY
Qty: 10 CAP | Refills: 0 | Status: SHIPPED | OUTPATIENT
Start: 2019-12-18 | End: 2019-12-23

## 2019-12-18 RX ORDER — FLUTICASONE PROPIONATE 50 MCG
2 SPRAY, SUSPENSION (ML) NASAL DAILY
Qty: 1 BOTTLE | Refills: 0 | Status: SHIPPED | OUTPATIENT
Start: 2019-12-18 | End: 2020-02-11

## 2019-12-18 RX ORDER — OSELTAMIVIR PHOSPHATE 75 MG/1
75 CAPSULE ORAL 2 TIMES DAILY
Qty: 10 CAP | Refills: 0 | Status: SHIPPED | OUTPATIENT
Start: 2019-12-18 | End: 2020-02-11

## 2019-12-18 RX ORDER — ECHINACEA PURPUREA EXTRACT 125 MG
2 TABLET ORAL
Qty: 1 BOTTLE | Refills: 0 | Status: SHIPPED | OUTPATIENT
Start: 2019-12-18 | End: 2020-02-11

## 2019-12-18 ASSESSMENT — ENCOUNTER SYMPTOMS
SORE THROAT: 1
COUGH: 0

## 2019-12-18 NOTE — PROGRESS NOTES
Subjective:     Heidi Gaspar is a 39 y.o. female who presents for Sore Throat (x3 days, ear pain sinus pain, fever 100.9f yesterday)      Symptoms 4-5 days ago. Chronic ear infections, felt like they were draining. Burning in ears. Pain in throat, feels more swollen. Pain 6/10. No cough. Feels chest congestion starting. Gets PNA and bronchitis. Fever 100.9 yesterday. Tries not to take to many OTC medications due to a heart condition. Tried mucinex. Horse voice. Hx of strep. Upper dental pain. Thera flu last night, limits use due to tachycardia. Current flu shot. Works in a school.     Otalgia    There is pain in both ears. The current episode started in the past 7 days. Associated symptoms include a sore throat. Pertinent negatives include no coughing, ear discharge or rash.       Past Medical History:   Diagnosis Date   • Angina    • Arrhythmia    • ASTHMA    • Bronchitis    • Cardiac arrhythmia    • Chickenpox    • Influenza    • Kidney, horseshoe    • Cruz's syndrome    • Other acquired deformity of toe    • Personal history of venous thrombosis and embolism     states when she was little she had problems clotting   • Pneumonia    • PSVT (paroxysmal supraventricular tachycardia) ([facility])    • S/P ablation of ventricular arrhythmia     x6 (most recent was 2006)   • Ramirez-Parkinson-White syndrome    • WPW (Marisela-Parkinson-White syndrome)        Past Surgical History:   Procedure Laterality Date   • PELVISCOPY N/A 9/9/2018    Procedure: PELVISCOPY;  Surgeon: Dianne Martinez M.D.;  Location: SURGERY [facility];  Service: Gyn Robotic   • OOPHORECTOMY Left 9/9/2018    Procedure: SALPINGOOPHORECTOMY;  Surgeon: Dianne Martinez M.D.;  Location: SURGERY [facility];  Service: Gyn Robotic   • REPEAT C SECTION  6/18/2014    Performed by Corinne E Capurro, M.D. at [facility]   • GYN SURGERY  2007    csection   • OTHER  2003    L BREAST LUMPECTOMY   • BLANCA BY LAPAROSCOPY     • CHOLECYSTECTOMY     •  OTHER      gallbladder removed   • OTHER      cardiac ablations x 6   • OTHER CARDIAC SURGERY      6 ablation   • PRIMARY C SECTION     • ZZZ IMPLANTABLE CARDIOVERTER DEFIBRILLATOR (ICD)         Social History     Socioeconomic History   • Marital status:      Spouse name: Not on file   • Number of children: Not on file   • Years of education: Not on file   • Highest education level: Not on file   Occupational History   • Not on file   Social Needs   • Financial resource strain: Not on file   • Food insecurity:     Worry: Not on file     Inability: Not on file   • Transportation needs:     Medical: Not on file     Non-medical: Not on file   Tobacco Use   • Smoking status: Never Smoker   • Smokeless tobacco: Never Used   Substance and Sexual Activity   • Alcohol use: No     Alcohol/week: 0.0 oz   • Drug use: No   • Sexual activity: Yes     Partners: Male     Birth control/protection: Surgical   Lifestyle   • Physical activity:     Days per week: Not on file     Minutes per session: Not on file   • Stress: Not on file   Relationships   • Social connections:     Talks on phone: Not on file     Gets together: Not on file     Attends [employer] service: Not on file     Active member of club or organization: Not on file     Attends meetings of clubs or organizations: Not on file     Relationship status: Not on file   • Intimate partner violence:     Fear of current or ex partner: Not on file     Emotionally abused: Not on file     Physically abused: Not on file     Forced sexual activity: Not on file   Other Topics Concern   • Not on file   Social History Narrative   • Not on file        Family History   Problem Relation Age of Onset   • Asthma Mother    • Lung Disease Mother         PE   • Other Mother         clotting disease   • Thyroid Mother    • Lung Disease Son         sees pediatric pulmonologist   • Asthma Son    • Diabetes Father    • Thyroid Sister    • Diabetes Maternal Aunt    • Other Maternal  "Grandfather         CHF   • Diabetes Paternal Aunt    • Diabetes Paternal Grandmother    • Psychiatric Illness Paternal Grandmother         alzheimers        Allergies   Allergen Reactions   • Inapsine [Droperidol] Anaphylaxis   • Keflex Rash and Swelling     RASH & SWELLING   • Morphine Unspecified     STOPS PEEING     • Penicillins Rash and Swelling     RASH & SWELLING       Review of Systems   Constitutional: Positive for fever and malaise/fatigue.   HENT: Positive for congestion, ear pain, sinus pain and sore throat. Negative for ear discharge.    Respiratory: Negative for cough, shortness of breath and stridor.    Skin: Negative for rash.   All other systems reviewed and are negative.       Objective:   /76   Pulse 73   Temp 36.2 °C (97.2 °F)   Ht 1.626 m (5' 4\")   Wt 82.6 kg (182 lb)   SpO2 96%   BMI 31.24 kg/m²     Physical Exam  Vitals signs reviewed.   Constitutional:       General: She is not in acute distress.     Appearance: She is well-developed.   HENT:      Head: Normocephalic and atraumatic.      Right Ear: External ear normal. No laceration, drainage or swelling. A middle ear effusion is present. No mastoid tenderness. Tympanic membrane is bulging. Tympanic membrane is not injected or erythematous.      Left Ear: External ear normal. No laceration, drainage or swelling. A middle ear effusion is present. No mastoid tenderness. Tympanic membrane is not injected, erythematous or bulging.      Ears:      Comments: Left TM cloudy. Right clear effusion.      Nose: Congestion and rhinorrhea present.      Right Sinus: Maxillary sinus tenderness present. No frontal sinus tenderness.      Left Sinus: Maxillary sinus tenderness present. No frontal sinus tenderness.      Mouth/Throat:      Mouth: Mucous membranes are moist.      Pharynx: Uvula midline. Posterior oropharyngeal erythema present. No pharyngeal swelling or uvula swelling.      Tonsils: No tonsillar exudate or tonsillar abscesses.      " Comments: Clear post nasal drip. Hoarse voice.   Eyes:      Conjunctiva/sclera: Conjunctivae normal.   Neck:      Musculoskeletal: Normal range of motion.   Cardiovascular:      Rate and Rhythm: Normal rate and regular rhythm.   Pulmonary:      Effort: Pulmonary effort is normal. No accessory muscle usage, prolonged expiration, respiratory distress or retractions.      Breath sounds: Normal breath sounds. No stridor. No decreased breath sounds, wheezing, rhonchi or rales.      Comments: Cough noted.   Musculoskeletal: Normal range of motion.   Lymphadenopathy:      Head:      Right side of head: No submental, submandibular, tonsillar, preauricular, posterior auricular or occipital adenopathy.      Left side of head: No submental, submandibular, tonsillar, preauricular, posterior auricular or occipital adenopathy.      Comments: Tenderness of preauricular area.    Skin:     General: Skin is warm and dry.      Findings: No rash.   Neurological:      General: No focal deficit present.      Mental Status: She is alert and oriented to person, place, and time.      GCS: GCS eye subscore is 4. GCS verbal subscore is 5. GCS motor subscore is 6.   Psychiatric:         Speech: Speech normal.         Behavior: Behavior normal.         Thought Content: Thought content normal.         Judgment: Judgment normal.         Assessment/Plan:   1. Influenza B  - oseltamivir (TAMIFLU) 75 MG Cap; Take 1 Cap by mouth 2 times a day.  Dispense: 10 Cap; Refill: 0    2. Acute non-recurrent maxillary sinusitis  - doxycycline (MONODOX) 100 MG capsule; Take 1 Cap by mouth 2 times a day for 5 days.  Dispense: 10 Cap; Refill: 0  - sodium chloride (OCEAN NASAL SPRAY) 0.65 % Solution; Spray 2 Sprays in nose every 2 hours as needed for Congestion.  Dispense: 1 Bottle; Refill: 0  - fluticasone (FLONASE) 50 MCG/ACT nasal spray; Spray 2 Sprays in nose every day.  Dispense: 1 Bottle; Refill: 0  - methylPREDNISolone (MEDROL DOSEPAK) 4 MG Tablet Therapy  Pack; Follow schedule on package instructions.  Dispense: 21 Tab; Refill: 0    3. Pharyngitis, unspecified etiology  - POCT Influenza A/B  - POCT Rapid Strep A  Contingent doxy for ears.     -Discussed viral etiology of Influenza.     Symptomatic care.  -Oral hydration and rest.   -Over the counter supressant as directed.  -Diphenhydramine as directed for rhinorrhea (runny nose) and sneezing.  -Tylenol or ibuprofen for pain and fever as directed. In children, Avoid Aspirin.   -Saline nasal spray as a decongestant.  -Infection control measures at home. Hand washing, covering sneeze/cough.  -Remain home from work, school, and other populated environments until at least 24 hours after you no longer have a fever.     Discussed associated complications, including risk of pneumonia and ear infections. Follow up with primary care provider. Follow up urgently for worsening symptoms, ear pain or drainage, shortness of breath, abdominal pain, or any other concerns. Follow up emergently for trouble breathing, elevated heart rate, chest pain, signs of dehydration, dizziness, weakness, decreased urine output, confusion, persistent vomiting, severe headache, neck stiffness, persistent high grade fever.    Differential diagnosis, natural history, supportive care, and indications for immediate follow-up discussed.

## 2019-12-18 NOTE — LETTER
December 18, 2019         Patient: Heidi Gaspar   YOB: 1980   Date of Visit: 12/18/2019           To Whom it May Concern:    Heidi Gaspar was seen in my clinic on 12/18/2019. She may return to work on 12/20/2019.     If you have any questions or concerns, please don't hesitate to call.        Sincerely,           YENY Padilla.  Electronically Signed

## 2019-12-18 NOTE — PATIENT INSTRUCTIONS
-Discussed viral etiology of Influenza.     Symptomatic care.  -Oral hydration and rest.   -Over the counter supressant as directed.  -Diphenhydramine as directed for rhinorrhea (runny nose) and sneezing..  -Tylenol or ibuprofen for pain and fever as directed. In children, Avoid Aspirin.   -Saline nasal spray as a decongestant.  -Infection control measures at home. Hand washing, covering sneeze/cough.  -Remain home from work, school, and other populated environments until at least 24 hours after you no longer have a fever.     Associated complications includie risk of pneumonia and ear infections. Follow up with primary care provider. Follow up urgently for worsening symptoms, ear pain or drainage, shortness of breath, abdominal pain, or any other concerns. Follow up emergently for trouble breathing, elevated heart rate, chest pain, signs of dehydration, dizziness, weakness, decreased urine output, confusion, persistent vomiting, severe headache, neck stiffness, persistent high grade fever.      Sinusitis, Adult  Sinusitis is soreness and inflammation of your sinuses. Sinuses are hollow spaces in the bones around your face. Your sinuses are located:  · Around your eyes.  · In the middle of your forehead.  · Behind your nose.  · In your cheekbones.  Your sinuses and nasal passages are lined with a stringy fluid (mucus). Mucus normally drains out of your sinuses. When your nasal tissues become inflamed or swollen, the mucus can become trapped or blocked so air cannot flow through your sinuses. This allows bacteria, viruses, and funguses to grow, which leads to infection.  Sinusitis can develop quickly and last for 7?10 days (acute) or for more than 12 weeks (chronic). Sinusitis often develops after a cold.  What are the causes?  This condition is caused by anything that creates swelling in the sinuses or stops mucus from draining, including:  · Allergies.  · Asthma.  · Bacterial or viral infection.  · Abnormally  shaped bones between the nasal passages.  · Nasal growths that contain mucus (nasal polyps).  · Narrow sinus openings.  · Pollutants, such as chemicals or irritants in the air.  · A foreign object stuck in the nose.  · A fungal infection. This is rare.  What increases the risk?  The following factors may make you more likely to develop this condition:  · Having allergies or asthma.  · Having had a recent cold or respiratory tract infection.  · Having structural deformities or blockages in your nose or sinuses.  · Having a weak immune system.  · Doing a lot of swimming or diving.  · Overusing nasal sprays.  · Smoking.  What are the signs or symptoms?  The main symptoms of this condition are pain and a feeling of pressure around the affected sinuses. Other symptoms include:  · Upper toothache.  · Earache.  · Headache.  · Bad breath.  · Decreased sense of smell and taste.  · A cough that may get worse at night.  · Fatigue.  · Fever.  · Thick drainage from your nose. The drainage is often green and it may contain pus (purulent).  · Stuffy nose or congestion.  · Postnasal drip. This is when extra mucus collects in the throat or back of the nose.  · Swelling and warmth over the affected sinuses.  · Sore throat.  · Sensitivity to light.  How is this diagnosed?  This condition is diagnosed based on symptoms, a medical history, and a physical exam. To find out if your condition is acute or chronic, your health care provider may:  · Look in your nose for signs of nasal polyps.  · Tap over the affected sinus to check for signs of infection.  · View the inside of your sinuses using an imaging device that has a light attached (endoscope).  If your health care provider suspects that you have chronic sinusitis, you may also:  · Be tested for allergies.  · Have a sample of mucus taken from your nose (nasal culture) and checked for bacteria.  · Have a mucus sample examined to see if your sinusitis is related to an allergy.  If your  sinusitis does not respond to treatment and it lasts longer than 8 weeks, you may have an MRI or CT scan to check your sinuses. These scans also help to determine how severe your infection is.  In rare cases, a bone biopsy may be done to rule out more serious types of fungal sinus disease.  How is this treated?  Treatment for sinusitis depends on the cause and whether your condition is chronic or acute. If a virus is causing your sinusitis, your symptoms will go away on their own within 10 days. You may be given medicines to relieve your symptoms, including:  · Topical nasal decongestants. They shrink swollen nasal passages and let mucus drain from your sinuses.  · Antihistamines. These drugs block inflammation that is triggered by allergies. This can help to ease swelling in your nose and sinuses.  · Topical nasal corticosteroids. These are nasal sprays that ease inflammation and swelling in your nose and sinuses.  · Nasal saline washes. These rinses can help to get rid of thick mucus in your nose.  If your condition is caused by bacteria, you will be given an antibiotic medicine. If your condition is caused by a fungus, you will be given an antifungal medicine.  Surgery may be needed to correct underlying conditions, such as narrow nasal passages. Surgery may also be needed to remove polyps.  Follow these instructions at home:  Medicines  · Take, use, or apply over-the-counter and prescription medicines only as told by your health care provider. These may include nasal sprays.  · If you were prescribed an antibiotic medicine, take it as told by your health care provider. Do not stop taking the antibiotic even if you start to feel better.  Hydrate and Humidify  · Drink enough water to keep your urine clear or pale yellow. Staying hydrated will help to thin your mucus.  · Use a cool mist humidifier to keep the humidity level in your home above 50%.  · Inhale steam for 10-15 minutes, 3-4 times a day or as told by  your health care provider. You can do this in the bathroom while a hot shower is running.  · Limit your exposure to cool or dry air.  Rest  · Rest as much as possible.  · Sleep with your head raised (elevated).  · Make sure to get enough sleep each night.  General instructions  · Apply a warm, moist washcloth to your face 3-4 times a day or as told by your health care provider. This will help with discomfort.  · Wash your hands often with soap and water to reduce your exposure to viruses and other germs. If soap and water are not available, use hand .  · Do not smoke. Avoid being around people who are smoking (secondhand smoke).  · Keep all follow-up visits as told by your health care provider. This is important.  Contact a health care provider if:  · You have a fever.  · Your symptoms get worse.  · Your symptoms do not improve within 10 days.  Get help right away if:  · You have a severe headache.  · You have persistent vomiting.  · You have pain or swelling around your face or eyes.  · You have vision problems.  · You develop confusion.  · Your neck is stiff.  · You have trouble breathing.  This information is not intended to replace advice given to you by your health care provider. Make sure you discuss any questions you have with your health care provider.  Document Released: 12/18/2006 Document Revised: 08/13/2017 Document Reviewed: 10/12/2016  [employer] Interactive Patient Education © 2017 [employer]    Eustachian Tube Dysfunction  Introduction  The eustachian tube connects the middle ear to the back of the nose. It regulates air pressure in the middle ear by allowing air to move between the ear and nose. It also helps to drain fluid from the middle ear space. When the eustachian tube does not function properly, air pressure, fluid, or both can build up in the middle ear.  Eustachian tube dysfunction can affect one or both ears.  What are the causes?  This condition happens when the eustachian tube  "becomes blocked or cannot open normally. This may result from:  · Ear infections.  · Colds and other upper respiratory infections.  · Allergies.  · Irritation, such as from cigarette smoke or acid from the stomach coming up into the esophagus (gastroesophageal reflux).  · Sudden changes in air pressure, such as from descending in an airplane.  · Abnormal growths in the nose or throat, such as nasal polyps, tumors, or enlarged tissue at the back of the throat (adenoids).  What increases the risk?  This condition may be more likely to develop in people who smoke and people who are overweight. Eustachian tube dysfunction may also be more likely to develop in children, especially children who have:  · Certain birth defects of the mouth, such as cleft palate.  · Large tonsils and adenoids.  What are the signs or symptoms?  Symptoms of this condition may include:  · A feeling of fullness in the ear.  · Ear pain.  · Clicking or popping noises in the ear.  · Ringing in the ear.  · Hearing loss.  · Loss of balance.  Symptoms may get worse when the air pressure around you changes, such as when you travel to an area of high elevation or fly on an airplane.  How is this diagnosed?  This condition may be diagnosed based on:  · Your symptoms.  · A physical exam of your ear, nose, and throat.  · Tests, such as those that measure:  ¨ The movement of your eardrum (tympanogram).  ¨ Your hearing (audiometry).  How is this treated?  Treatment depends on the cause and severity of your condition. If your symptoms are mild, you may be able to relieve your symptoms by moving air into (\"popping\") your ears. If you have symptoms of fluid in your ears, treatment may include:  · Decongestants.  · Antihistamines.  · Nasal sprays or ear drops that contain medicines that reduce swelling (steroids).  In some cases, you may need to have a procedure to drain the fluid in your eardrum (myringotomy). In this procedure, a small tube is placed in the " eardrum to:  · Drain the fluid.  · Restore the air in the middle ear space.  Follow these instructions at home:  · Take over-the-counter and prescription medicines only as told by your health care provider.  · Use techniques to help pop your ears as recommended by your health care provider. These may include:  ¨ Chewing gum.  ¨ Yawning.  ¨ Frequent, forceful swallowing.  ¨ Closing your mouth, holding your nose closed, and gently blowing as if you are trying to blow air out of your nose.  · Do not do any of the following until your health care provider approves:  ¨ Travel to high altitudes.  ¨ Fly in airplanes.  ¨ Work in a pressurized cabin or room.  ¨ Scuba dive.  · Keep your ears dry. Dry your ears completely after showering or bathing.  · Do not smoke.  · Keep all follow-up visits as told by your health care provider. This is important.  Contact a health care provider if:  · Your symptoms do not go away after treatment.  · Your symptoms come back after treatment.  · You are unable to pop your ears.  · You have:  ¨ A fever.  ¨ Pain in your ear.  ¨ Pain in your head or neck.  ¨ Fluid draining from your ear.  · Your hearing suddenly changes.  · You become very dizzy.  · You lose your balance.  This information is not intended to replace advice given to you by your health care provider. Make sure you discuss any questions you have with your health care provider.  Document Released: 01/13/2017 Document Revised: 05/25/2017 Document Reviewed: 01/06/2016  © 2017 [employer]

## 2019-12-21 ASSESSMENT — ENCOUNTER SYMPTOMS
FEVER: 1
STRIDOR: 0
SINUS PAIN: 1
SHORTNESS OF BREATH: 0

## 2020-02-11 ENCOUNTER — OFFICE VISIT (OUTPATIENT)
Dept: URGENT CARE | Facility: PHYSICIAN GROUP | Age: 40
End: 2020-02-11
Payer: COMMERCIAL

## 2020-02-11 ENCOUNTER — HOSPITAL ENCOUNTER (OUTPATIENT)
Dept: LAB | Facility: MEDICAL CENTER | Age: 40
End: 2020-02-11
Attending: INTERNAL MEDICINE
Payer: COMMERCIAL

## 2020-02-11 VITALS
BODY MASS INDEX: 31.92 KG/M2 | WEIGHT: 187 LBS | SYSTOLIC BLOOD PRESSURE: 112 MMHG | HEART RATE: 68 BPM | DIASTOLIC BLOOD PRESSURE: 82 MMHG | TEMPERATURE: 98.8 F | HEIGHT: 64 IN | OXYGEN SATURATION: 97 %

## 2020-02-11 DIAGNOSIS — R19.7 DIARRHEA, UNSPECIFIED TYPE: ICD-10-CM

## 2020-02-11 DIAGNOSIS — R10.33 PERIUMBILICAL ABDOMINAL PAIN: ICD-10-CM

## 2020-02-11 DIAGNOSIS — R10.33 PERIUMBILICAL ABDOMINAL PAIN: Primary | ICD-10-CM

## 2020-02-11 LAB
ALBUMIN SERPL BCP-MCNC: 4.8 G/DL (ref 3.2–4.9)
ALBUMIN/GLOB SERPL: 1.7 G/DL
ALP SERPL-CCNC: 54 U/L (ref 30–99)
ALT SERPL-CCNC: 20 U/L (ref 2–50)
ANION GAP SERPL CALC-SCNC: 11 MMOL/L (ref 0–11.9)
APPEARANCE UR: CLEAR
AST SERPL-CCNC: 16 U/L (ref 12–45)
BASOPHILS # BLD AUTO: 0.6 % (ref 0–1.8)
BASOPHILS # BLD: 0.05 K/UL (ref 0–0.12)
BILIRUB SERPL-MCNC: 1.5 MG/DL (ref 0.1–1.5)
BILIRUB UR STRIP-MCNC: NEGATIVE MG/DL
BUN SERPL-MCNC: 19 MG/DL (ref 8–22)
CALCIUM SERPL-MCNC: 10.1 MG/DL (ref 8.5–10.5)
CHLORIDE SERPL-SCNC: 103 MMOL/L (ref 96–112)
CO2 SERPL-SCNC: 24 MMOL/L (ref 20–33)
COLOR UR AUTO: NORMAL
CREAT SERPL-MCNC: 0.64 MG/DL (ref 0.5–1.4)
EOSINOPHIL # BLD AUTO: 0.26 K/UL (ref 0–0.51)
EOSINOPHIL NFR BLD: 3.2 % (ref 0–6.9)
ERYTHROCYTE [DISTWIDTH] IN BLOOD BY AUTOMATED COUNT: 44.3 FL (ref 35.9–50)
GLOBULIN SER CALC-MCNC: 2.8 G/DL (ref 1.9–3.5)
GLUCOSE SERPL-MCNC: 82 MG/DL (ref 65–99)
GLUCOSE UR STRIP.AUTO-MCNC: NEGATIVE MG/DL
HCT VFR BLD AUTO: 46.2 % (ref 37–47)
HGB BLD-MCNC: 16 G/DL (ref 12–16)
IMM GRANULOCYTES # BLD AUTO: 0.04 K/UL (ref 0–0.11)
IMM GRANULOCYTES NFR BLD AUTO: 0.5 % (ref 0–0.9)
KETONES UR STRIP.AUTO-MCNC: NEGATIVE MG/DL
LEUKOCYTE ESTERASE UR QL STRIP.AUTO: NEGATIVE
LYMPHOCYTES # BLD AUTO: 2.75 K/UL (ref 1–4.8)
LYMPHOCYTES NFR BLD: 33.6 % (ref 22–41)
MCH RBC QN AUTO: 30.5 PG (ref 27–33)
MCHC RBC AUTO-ENTMCNC: 34.6 G/DL (ref 33.6–35)
MCV RBC AUTO: 88.2 FL (ref 81.4–97.8)
MONOCYTES # BLD AUTO: 0.98 K/UL (ref 0–0.85)
MONOCYTES NFR BLD AUTO: 12 % (ref 0–13.4)
NEUTROPHILS # BLD AUTO: 4.1 K/UL (ref 2–7.15)
NEUTROPHILS NFR BLD: 50.1 % (ref 44–72)
NITRITE UR QL STRIP.AUTO: NEGATIVE
NRBC # BLD AUTO: 0 K/UL
NRBC BLD-RTO: 0 /100 WBC
PH UR STRIP.AUTO: 5.5 [PH] (ref 5–8)
PLATELET # BLD AUTO: 287 K/UL (ref 164–446)
PMV BLD AUTO: 11.6 FL (ref 9–12.9)
POTASSIUM SERPL-SCNC: 4.4 MMOL/L (ref 3.6–5.5)
PROT SERPL-MCNC: 7.6 G/DL (ref 6–8.2)
PROT UR QL STRIP: NEGATIVE MG/DL
RBC # BLD AUTO: 5.24 M/UL (ref 4.2–5.4)
RBC UR QL AUTO: NORMAL
SODIUM SERPL-SCNC: 138 MMOL/L (ref 135–145)
SP GR UR STRIP.AUTO: 1.03
UROBILINOGEN UR STRIP-MCNC: 0.2 MG/DL
WBC # BLD AUTO: 8.2 K/UL (ref 4.8–10.8)

## 2020-02-11 PROCEDURE — 36415 COLL VENOUS BLD VENIPUNCTURE: CPT

## 2020-02-11 PROCEDURE — 99204 OFFICE O/P NEW MOD 45 MIN: CPT | Performed by: INTERNAL MEDICINE

## 2020-02-11 PROCEDURE — 85025 COMPLETE CBC W/AUTO DIFF WBC: CPT

## 2020-02-11 PROCEDURE — 81002 URINALYSIS NONAUTO W/O SCOPE: CPT | Performed by: INTERNAL MEDICINE

## 2020-02-11 PROCEDURE — 80053 COMPREHEN METABOLIC PANEL: CPT

## 2020-02-11 ASSESSMENT — ENCOUNTER SYMPTOMS
FEVER: 0
NAUSEA: 1
DIARRHEA: 1
VOMITING: 1
ABDOMINAL PAIN: 1
CONSTIPATION: 0

## 2020-02-11 NOTE — PROGRESS NOTES
Subjective:     Heidi Gaspar is a 39 y.o. female who presents for Abdominal Pain (sinus infection, head pressure and face pressure, ear and eye pressurex1 mo  stomach ache, mid abdomen, diarrhea x1.5 wks, concerned if sick from sushi, back pain)       Abdominal Pain   This is a new problem. The current episode started 1 to 4 weeks ago. The onset quality is gradual. The problem occurs constantly. The problem has been waxing and waning. The pain is located in the periumbilical region. The pain is mild. The quality of the pain is burning. Associated symptoms include diarrhea (after she eats), nausea (resolved) and vomiting (resolved). Pertinent negatives include no constipation or fever.     Past Medical History:   Diagnosis Date   • Angina    • Arrhythmia    • ASTHMA    • Bronchitis    • Cardiac arrhythmia    • Chickenpox    • Influenza    • Kidney, horseshoe    • Cruz's syndrome    • Other acquired deformity of toe    • Personal history of venous thrombosis and embolism     states when she was little she had problems clotting   • Pneumonia    • PSVT (paroxysmal supraventricular tachycardia) ([facility])    • S/P ablation of ventricular arrhythmia     x6 (most recent was 2006)   • Ramirez-Parkinson-White syndrome    • WPW (Marisela-Parkinson-White syndrome)      Past Surgical History:   Procedure Laterality Date   • PELVISCOPY N/A 9/9/2018    Procedure: PELVISCOPY;  Surgeon: Dianne Martinez M.D.;  Location: SURGERY [facility];  Service: Gyn Robotic   • OOPHORECTOMY Left 9/9/2018    Procedure: SALPINGOOPHORECTOMY;  Surgeon: Dianne Martinez M.D.;  Location: SURGERY [facility];  Service: Gyn Robotic   • REPEAT C SECTION  6/18/2014    Performed by Corinne E Capurro, M.D. at [facility]   • GYN SURGERY  2007    csection   • OTHER  2003    L BREAST LUMPECTOMY   • BLANCA BY LAPAROSCOPY     • CHOLECYSTECTOMY     • OTHER      gallbladder removed   • OTHER      cardiac ablations x 6   • OTHER CARDIAC SURGERY      6  ablation   • PRIMARY C SECTION     • ZZZ IMPLANTABLE CARDIOVERTER DEFIBRILLATOR (ICD)       Social History     Socioeconomic History   • Marital status:      Spouse name: Not on file   • Number of children: Not on file   • Years of education: Not on file   • Highest education level: Not on file   Occupational History   • Not on file   Social Needs   • Financial resource strain: Not on file   • Food insecurity:     Worry: Not on file     Inability: Not on file   • Transportation needs:     Medical: Not on file     Non-medical: Not on file   Tobacco Use   • Smoking status: Never Smoker   • Smokeless tobacco: Never Used   Substance and Sexual Activity   • Alcohol use: No     Alcohol/week: 0.0 oz   • Drug use: No   • Sexual activity: Yes     Partners: Male     Birth control/protection: Surgical   Lifestyle   • Physical activity:     Days per week: Not on file     Minutes per session: Not on file   • Stress: Not on file   Relationships   • Social connections:     Talks on phone: Not on file     Gets together: Not on file     Attends Orthodox service: Not on file     Active member of club or organization: Not on file     Attends meetings of clubs or organizations: Not on file     Relationship status: Not on file   • Intimate partner violence:     Fear of current or ex partner: Not on file     Emotionally abused: Not on file     Physically abused: Not on file     Forced sexual activity: Not on file   Other Topics Concern   • Not on file   Social History Narrative   • Not on file      Family History   Problem Relation Age of Onset   • Asthma Mother    • Lung Disease Mother         PE   • Other Mother         clotting disease   • Thyroid Mother    • Lung Disease Son         sees pediatric pulmonologist   • Asthma Son    • Diabetes Father    • Thyroid Sister    • Diabetes Maternal Aunt    • Other Maternal Grandfather         CHF   • Diabetes Paternal Aunt    • Diabetes Paternal Grandmother    • Psychiatric Illness  "Paternal Grandmother         alzheimers    Review of Systems   Constitutional: Negative for fever.   Gastrointestinal: Positive for abdominal pain, diarrhea (after she eats), nausea (resolved) and vomiting (resolved). Negative for constipation.   All other systems reviewed and are negative.    Allergies   Allergen Reactions   • Inapsine [Droperidol] Anaphylaxis   • Keflex Rash and Swelling     RASH & SWELLING   • Morphine Unspecified     STOPS PEEING     • Penicillins Rash and Swelling     RASH & SWELLING      Objective:   /82   Pulse 68   Temp 37.1 °C (98.8 °F)   Ht 1.626 m (5' 4\")   Wt 84.8 kg (187 lb)   SpO2 97%   BMI 32.10 kg/m²   Physical Exam  Constitutional:       General: She is not in acute distress.     Appearance: She is well-developed.   HENT:      Head: Normocephalic and atraumatic.      Mouth/Throat:      Mouth: Mucous membranes are moist.      Pharynx: Oropharynx is clear.   Eyes:      Conjunctiva/sclera: Conjunctivae normal.   Neck:      Musculoskeletal: No neck rigidity.   Cardiovascular:      Rate and Rhythm: Normal rate and regular rhythm.   Pulmonary:      Effort: Pulmonary effort is normal. No respiratory distress.      Breath sounds: Normal breath sounds.   Abdominal:      Tenderness: There is tenderness (mild periumblical).   Lymphadenopathy:      Cervical: No cervical adenopathy.   Skin:     General: Skin is warm and dry.      Capillary Refill: Capillary refill takes less than 2 seconds.   Neurological:      Mental Status: She is alert and oriented to person, place, and time.      Sensory: No sensory deficit.      Deep Tendon Reflexes: Reflexes are normal and symmetric.   Psychiatric:         Mood and Affect: Mood normal.         Behavior: Behavior normal.           Assessment/Plan:   Assessment    1. Periumbilical abdominal pain  - CBC WITH DIFFERENTIAL; Future  - Comp Metabolic Panel; Future  - Complete O&P; Future  - CULTURE STOOL; Future    2. Diarrhea, unspecified type  - CBC " WITH DIFFERENTIAL; Future  - Comp Metabolic Panel; Future  - Complete O&P; Future  - CULTURE STOOL; Future      CBC CMP was within normal limits and stool studies are pending and I advised her brat diet and and Imodium as needed.  Next    High risk of diagnosis such as abdominal obstruction perforation appendicitis, diverticulitis, dysentery was considered in the differential diagnosis  Differential diagnosis, natural history, supportive care, and indications for immediate follow-up discussed.

## 2020-02-25 ENCOUNTER — OFFICE VISIT (OUTPATIENT)
Dept: URGENT CARE | Facility: PHYSICIAN GROUP | Age: 40
End: 2020-02-25
Payer: COMMERCIAL

## 2020-02-25 VITALS
HEIGHT: 64 IN | HEART RATE: 78 BPM | SYSTOLIC BLOOD PRESSURE: 118 MMHG | BODY MASS INDEX: 32.27 KG/M2 | TEMPERATURE: 98 F | OXYGEN SATURATION: 95 % | RESPIRATION RATE: 16 BRPM | WEIGHT: 189 LBS | DIASTOLIC BLOOD PRESSURE: 82 MMHG

## 2020-02-25 DIAGNOSIS — J01.90 ACUTE NON-RECURRENT SINUSITIS, UNSPECIFIED LOCATION: ICD-10-CM

## 2020-02-25 PROCEDURE — 99214 OFFICE O/P EST MOD 30 MIN: CPT | Performed by: FAMILY MEDICINE

## 2020-02-25 RX ORDER — DOXYCYCLINE HYCLATE 100 MG
100 TABLET ORAL 2 TIMES DAILY
Qty: 14 TAB | Refills: 0 | Status: SHIPPED | OUTPATIENT
Start: 2020-02-25 | End: 2020-03-03

## 2020-02-25 NOTE — PATIENT INSTRUCTIONS
Start Doxycycline twice daily as directed for5 days. Make sure you stay up right for at least 2 hours after each oral antibiotic use  Nasal saline irrigation or Patricia pot recommended  Follow up if not significantly improved as expected in 7 days, sooner if any worsening or new symptoms        Sinusitis, Adult  Sinusitis is soreness and inflammation of your sinuses. Sinuses are hollow spaces in the bones around your face. They are located:  · Around your eyes.  · In the middle of your forehead.  · Behind your nose.  · In your cheekbones.  Your sinuses and nasal passages are lined with a stringy fluid (mucus). Mucus normally drains out of your sinuses. When your nasal tissues get inflamed or swollen, the mucus can get trapped or blocked so air cannot flow through your sinuses. This lets bacteria, viruses, and funguses grow, and that leads to infection.  Follow these instructions at home:  Medicines  · Take, use, or apply over-the-counter and prescription medicines only as told by your doctor. These may include nasal sprays.  · If you were prescribed an antibiotic medicine, take it as told by your doctor. Do not stop taking the antibiotic even if you start to feel better.  Hydrate and Humidify  · Drink enough water to keep your pee (urine) clear or pale yellow.  · Use a cool mist humidifier to keep the humidity level in your home above 50%.  · Breathe in steam for 10-15 minutes, 3-4 times a day or as told by your doctor. You can do this in the bathroom while a hot shower is running.  · Try not to spend time in cool or dry air.  Rest  · Rest as much as possible.  · Sleep with your head raised (elevated).  · Make sure to get enough sleep each night.  General instructions  · Put a warm, moist washcloth on your face 3-4 times a day or as told by your doctor. This will help with discomfort.  · Wash your hands often with soap and water. If there is no soap and water, use hand .  · Do not smoke. Avoid being around  people who are smoking (secondhand smoke).  · Keep all follow-up visits as told by your doctor. This is important.  Contact a doctor if:  · You have a fever.  · Your symptoms get worse.  · Your symptoms do not get better within 10 days.  Get help right away if:  · You have a very bad headache.  · You cannot stop throwing up (vomiting).  · You have pain or swelling around your face or eyes.  · You have trouble seeing.  · You feel confused.  · Your neck is stiff.  · You have trouble breathing.  This information is not intended to replace advice given to you by your health care provider. Make sure you discuss any questions you have with your health care provider.  Document Released: 06/05/2009 Document Revised: 08/13/2017 Document Reviewed: 10/12/2016  [employer] Patient Education © 2017 [employer]

## 2020-02-25 NOTE — PROGRESS NOTES
"Subjective:      Heidi Gaspar is a 39 y.o. female who presents with Otalgia (possible sinus infection, pain behind ears,ringing in right ear,face hurts,pressure in cheeks and teeth,x 1 month)            This is a new problem.  39-year-old presenting for evaluation of sinus pain and congestion for the past 2+ weeks.  She denies any history of sinus surgery or frequent sinus infection.  She also having bilateral upper teeth pain.  She denies any recent history of tooth problems.  No fever reported.  Some cough but no travel history.  No sore throat.  She has not been using any nasal saline irrigation      Review of Systems   All other systems reviewed and are negative.         Objective:     /82 (BP Location: Left arm, Patient Position: Sitting, BP Cuff Size: Adult)   Pulse 78   Temp 36.7 °C (98 °F) (Temporal)   Resp 16   Ht 1.626 m (5' 4\")   Wt 85.7 kg (189 lb)   SpO2 95%   BMI 32.44 kg/m²      Physical Exam  Constitutional:       General: She is not in acute distress.     Appearance: She is not ill-appearing, toxic-appearing or diaphoretic.   HENT:      Head: Normocephalic and atraumatic.      Right Ear: Tympanic membrane, ear canal and external ear normal.      Left Ear: Tympanic membrane, ear canal and external ear normal.      Nose: Mucosal edema present.      Right Sinus: Maxillary sinus tenderness present. No frontal sinus tenderness.      Left Sinus: Maxillary sinus tenderness present. No frontal sinus tenderness.      Mouth/Throat:      Mouth: Mucous membranes are moist. No oral lesions.      Pharynx: Oropharynx is clear. Uvula midline. No pharyngeal swelling, oropharyngeal exudate or posterior oropharyngeal erythema.      Tonsils: No tonsillar exudate or tonsillar abscesses.   Eyes:      General: No scleral icterus.     Conjunctiva/sclera: Conjunctivae normal.   Neck:      Musculoskeletal: Neck supple.   Cardiovascular:      Rate and Rhythm: Normal rate and regular rhythm.      Heart " sounds: No murmur. No friction rub. No gallop.    Pulmonary:      Effort: Pulmonary effort is normal. No respiratory distress.      Breath sounds: No stridor. No wheezing, rhonchi or rales.   Lymphadenopathy:      Cervical: No cervical adenopathy.   Skin:     General: Skin is warm.      Coloration: Skin is not jaundiced or pale.      Findings: No erythema or rash.   Neurological:      Mental Status: She is alert and oriented to person, place, and time.   Psychiatric:         Mood and Affect: Mood normal.                 Assessment/Plan:       1. Acute non-recurrent sinusitis, unspecified locatio  - doxycycline (VIBRAMYCIN) 100 MG Tab; Take 1 Tab by mouth 2 times a day for 7 days.  Dispense: 14 Tab; Refill: 0      Continue symptomatic care  Plan per orders and instructions  Warning signs reviewed

## 2020-03-12 ENCOUNTER — TELEPHONE (OUTPATIENT)
Dept: INTERNAL MEDICINE | Facility: IMAGING CENTER | Age: 40
End: 2020-03-12

## 2020-03-12 NOTE — TELEPHONE ENCOUNTER
Please call patient: Bronchitis is most commonly viral. Symptoms do not warrant antibiotic at this time. I would recommend being evaluated at Urgent Care if symptoms worsen and she feels that she requires an antibiotic. She may contact me via [employer] with questions.

## 2020-03-12 NOTE — TELEPHONE ENCOUNTER
1. Caller Name:Heidi Gaspar                         Call Back Number: [phone]  [facility] PCP or Specialty Provider: Yes Federico Antonio        2.  Does patient have any active symptoms of respiratory illness (fever OR cough OR shortness of breath)? Yes, the patient reports the following respiratory symptoms: shortness of breath and chest congestion. Has been drinking hot tea and using charlie's.    3.  Does patient have any comoribidities? None Asthma    4.  In the last 30 days, has the patient traveled outside of the country OR in a high risk area within the  OR have any known contact with someone who has or is suspected to have COVID-19?  No.    5. Disposition: Advised to perform self care, monitor for worsening symptoms and to call back in 3 days if no improvement .    Note routed to PCP: Provider action needed: Patient reports she feels like she has a small head cold but does endorse chest congestion. She states that she does get bronchitis easily and wanted to catch this before it got worse. Patient seems like she would benefit from a z-pack so that she can continue to stay home and isolate. Would you be willing to send something into her pharmacy? Verified her pharmacy is [facility]. Please have your office call her with your decision making. Thank you for your time.

## 2020-03-13 NOTE — TELEPHONE ENCOUNTER
Phone Number Called: [phone] (home) [phone] (work)      Call outcome: Spoke to patient regarding message below.    Message: Informed Pt of the information from Provider, advised to reach out to Provider via [employer] with any further questions.

## 2020-03-15 ENCOUNTER — OFFICE VISIT (OUTPATIENT)
Dept: URGENT CARE | Facility: PHYSICIAN GROUP | Age: 40
End: 2020-03-15
Payer: COMMERCIAL

## 2020-03-15 VITALS
HEART RATE: 84 BPM | RESPIRATION RATE: 16 BRPM | DIASTOLIC BLOOD PRESSURE: 74 MMHG | TEMPERATURE: 98 F | BODY MASS INDEX: 32.79 KG/M2 | SYSTOLIC BLOOD PRESSURE: 122 MMHG | WEIGHT: 191 LBS | OXYGEN SATURATION: 96 %

## 2020-03-15 DIAGNOSIS — J01.10 ACUTE NON-RECURRENT FRONTAL SINUSITIS: ICD-10-CM

## 2020-03-15 PROCEDURE — 99214 OFFICE O/P EST MOD 30 MIN: CPT | Performed by: FAMILY MEDICINE

## 2020-03-15 RX ORDER — DOXYCYCLINE HYCLATE 100 MG
100 TABLET ORAL 2 TIMES DAILY
Qty: 20 TAB | Refills: 0 | Status: SHIPPED | OUTPATIENT
Start: 2020-03-15 | End: 2020-03-25

## 2020-03-15 ASSESSMENT — ENCOUNTER SYMPTOMS
HEADACHES: 1
CHILLS: 0
SORE THROAT: 0

## 2020-03-15 ASSESSMENT — FIBROSIS 4 INDEX: FIB4 SCORE: 0.51

## 2020-03-15 NOTE — PROGRESS NOTES
Subjective:   Heidi Gaspar  is a 39 y.o. female who presents for Sinus Problem (right side of face hurts,ear pain,sore throat)        Sinus Problem   This is a new problem. The current episode started 1 to 4 weeks ago. The problem has been gradually worsening since onset. There has been no fever. The pain is moderate. Associated symptoms include headaches. Pertinent negatives include no chills, sneezing or sore throat.     Review of Systems   Constitutional: Negative for chills.   HENT: Negative for sneezing and sore throat.    Neurological: Positive for headaches.     Allergies   Allergen Reactions   • Inapsine [Droperidol] Anaphylaxis   • Keflex Rash and Swelling     RASH & SWELLING   • Morphine Unspecified     STOPS PEEING     • Penicillins Rash and Swelling     RASH & SWELLING      Objective:   /74 (BP Location: Right arm, Patient Position: Sitting, BP Cuff Size: Adult)   Pulse 84   Temp 36.7 °C (98 °F) (Temporal)   Resp 16   Wt 86.6 kg (191 lb)   SpO2 96%   BMI 32.79 kg/m²   Physical Exam  Constitutional:       General: She is not in acute distress.     Appearance: She is well-developed.   HENT:      Head: Normocephalic and atraumatic.      Nose: Mucosal edema and rhinorrhea present.      Right Sinus: Frontal sinus tenderness present. No maxillary sinus tenderness.      Left Sinus: Frontal sinus tenderness present. No maxillary sinus tenderness.   Eyes:      Conjunctiva/sclera: Conjunctivae normal.      Pupils: Pupils are equal, round, and reactive to light.   Cardiovascular:      Rate and Rhythm: Normal rate and regular rhythm.      Heart sounds: No murmur.   Pulmonary:      Effort: Pulmonary effort is normal. No respiratory distress.      Breath sounds: Normal breath sounds.   Abdominal:      General: There is no distension.      Palpations: Abdomen is soft.      Tenderness: There is no abdominal tenderness.   Skin:     General: Skin is warm and dry.   Neurological:      Mental Status: She  is alert and oriented to person, place, and time.      Sensory: No sensory deficit.      Deep Tendon Reflexes: Reflexes are normal and symmetric.           Assessment/Plan:   1. Acute non-recurrent frontal sinusitis  - doxycycline (VIBRAMYCIN) 100 MG Tab; Take 1 Tab by mouth 2 times a day for 10 days.  Dispense: 20 Tab; Refill: 0    Differential diagnosis, natural history, supportive care, and indications for immediate follow-up discussed.

## 2020-03-18 ENCOUNTER — TELEPHONE (OUTPATIENT)
Dept: MEDICAL GROUP | Facility: PHYSICIAN GROUP | Age: 40
End: 2020-03-18

## 2020-03-18 DIAGNOSIS — J45.901 MILD ASTHMA EXACERBATION: ICD-10-CM

## 2020-03-18 DIAGNOSIS — J45.20 MILD INTERMITTENT ASTHMA WITHOUT COMPLICATION: ICD-10-CM

## 2020-03-18 RX ORDER — METHYLPREDNISOLONE 4 MG/1
TABLET ORAL
Qty: 21 TAB | Refills: 0 | Status: SHIPPED | OUTPATIENT
Start: 2020-03-18 | End: 2020-08-13

## 2020-03-18 NOTE — TELEPHONE ENCOUNTER
Feeling better.  Patient continues to report tightness in her chest, swollen feeling.  States she is using her rescue inhaler as well as nebulizer without alleviation of symptoms.  Was seen 315/20 and treated for sinusitis with doxycycline and states that that is improving with the antibiotic.  Related to continued tightness in her chest plan is to send a Medrol Dosepak related to asthma exacerbation.  Patient will contact this provider if symptoms worsen or do not improve.

## 2020-03-23 ENCOUNTER — TELEPHONE (OUTPATIENT)
Dept: HEALTH INFORMATION MANAGEMENT | Facility: OTHER | Age: 40
End: 2020-03-23

## 2020-03-23 ENCOUNTER — OFFICE VISIT (OUTPATIENT)
Dept: URGENT CARE | Facility: CLINIC | Age: 40
End: 2020-03-23
Payer: COMMERCIAL

## 2020-03-23 ENCOUNTER — APPOINTMENT (OUTPATIENT)
Dept: RADIOLOGY | Facility: IMAGING CENTER | Age: 40
End: 2020-03-23
Attending: PHYSICIAN ASSISTANT
Payer: COMMERCIAL

## 2020-03-23 VITALS
DIASTOLIC BLOOD PRESSURE: 76 MMHG | SYSTOLIC BLOOD PRESSURE: 136 MMHG | OXYGEN SATURATION: 98 % | WEIGHT: 186.8 LBS | HEIGHT: 64 IN | BODY MASS INDEX: 31.89 KG/M2 | RESPIRATION RATE: 16 BRPM | TEMPERATURE: 98 F | HEART RATE: 64 BPM

## 2020-03-23 DIAGNOSIS — R07.89 CHEST TIGHTNESS: ICD-10-CM

## 2020-03-23 DIAGNOSIS — R07.89 OTHER CHEST PAIN: ICD-10-CM

## 2020-03-23 DIAGNOSIS — M79.89 RIGHT LEG SWELLING: ICD-10-CM

## 2020-03-23 PROCEDURE — 93000 ELECTROCARDIOGRAM COMPLETE: CPT | Performed by: PHYSICIAN ASSISTANT

## 2020-03-23 PROCEDURE — 71046 X-RAY EXAM CHEST 2 VIEWS: CPT | Mod: TC | Performed by: PHYSICIAN ASSISTANT

## 2020-03-23 PROCEDURE — 99215 OFFICE O/P EST HI 40 MIN: CPT | Performed by: PHYSICIAN ASSISTANT

## 2020-03-23 ASSESSMENT — FIBROSIS 4 INDEX: FIB4 SCORE: 0.51

## 2020-03-23 ASSESSMENT — ENCOUNTER SYMPTOMS
SHORTNESS OF BREATH: 1
NAUSEA: 0
COUGH: 0
SPUTUM PRODUCTION: 0
FEVER: 0
HEMOPTYSIS: 0
EYES NEGATIVE: 1
VOMITING: 0
DIARRHEA: 0
CHILLS: 0
NEUROLOGICAL NEGATIVE: 1
PALPITATIONS: 0
ABDOMINAL PAIN: 0
WHEEZING: 0
MYALGIAS: 0
SORE THROAT: 0

## 2020-03-23 NOTE — TELEPHONE ENCOUNTER
1. Caller Name:Heidi Gaspar  Call Back Number: [phone]  [facility] PCP or Specialty Provider: Yes           2.  Does patient have any active symptoms of respiratory illness (fever OR cough OR shortness of breath OR sore throat)? Yes, the patient reports the following respiratory symptoms: shortness of breath. Described as chest tightness X 1 1/2 weeks states pcp worried d/t swelling in rt leg    3.  Does patient have any comoribidities? None     4.  Has the patient traveled in the last 14 days OR had any known contact with someone who is suspected or confirmed to have COVID-19?  No.    5. Disposition: Cleared by RN Triage; OK to keep/schedule appointment    Note routed to [facility] Provider: TORI only.

## 2020-03-23 NOTE — PROGRESS NOTES
"Subjective:      Heidi Gaspar is a 39 y.o. female who presents with Congestion (x1.5wks, chest tightness, sob, uncomfortable)        HPI  The patient is a 39-year-old female with a history of Marisela-Parkinson-White syndrome, factor V Leiden, mild intermittent asthma, left posterior fascicular block, right bundle branch block, bradycardia. presents the clinic complaining of chest tightness started 1 week ago.  Recently diagnosed in the past couple months with sinus infections. She was placed on doxycycline. Her chest tightness continued and she called her PCP. She was placed on Medrol Dosepak assuming this was symptoms due to her asthma. Chest hurts described as a \"super tightness and hard to breath and cant expand enough\".   Location mostly to central chest and right side under breast. Feels uncomfortable and restricted.  No recent injury.  No change in physical activity.  No change in sleep position.  She does not believe her symptoms are due to her asthma.  Denies any fever, chills, cough, wheezing, severe difficulty breathing, sore throat, fatigue, abdominal pain, nausea, vomiting, diarrhea.    No history of blood clots. Recent US of right leg on 11/21/19 due occasional right leg swelling that started 6-7 months ago which showed no DVT and normal flow. She was supposed to follow up non-urgently to vascular surgeon for possible further evaluation, however, she has not yet followed up.   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 No complications since. She sees Dr. De Jesus Cardiologist. Recent Holter Monitor within the last year with mild intermittent bradycardia. History of Factor V Leiden Mutation.   No hormonal contraceptives. No recent travel. She has been self isolating herself due to COVID-19 virus.    Review of Systems   Constitutional: Negative for chills, fever and malaise/fatigue.   HENT: Negative for congestion and sore throat.    Eyes: Negative.    Respiratory: Positive for shortness of " "breath. Negative for cough, hemoptysis, sputum production and wheezing.    Cardiovascular: Positive for chest pain and leg swelling. Negative for palpitations.   Gastrointestinal: Negative for abdominal pain, diarrhea, nausea and vomiting.   Genitourinary: Negative.    Musculoskeletal: Negative for myalgias.   Skin: Negative.    Neurological: Negative.           Objective:     /76 (BP Location: Left arm, Patient Position: Sitting, BP Cuff Size: Adult)   Pulse 64   Temp 36.7 °C (98 °F) (Temporal)   Resp 16   Ht 1.626 m (5' 4\")   Wt 84.7 kg (186 lb 12.8 oz)   SpO2 98%   BMI 32.06 kg/m²      Physical Exam  Vitals signs reviewed.   Constitutional:       General: She is not in acute distress.     Appearance: Normal appearance. She is not ill-appearing.   HENT:      Right Ear: Tympanic membrane, ear canal and external ear normal.      Left Ear: Tympanic membrane and external ear normal.      Nose: Nose normal.      Mouth/Throat:      Pharynx: No oropharyngeal exudate or posterior oropharyngeal erythema.   Eyes:      Conjunctiva/sclera: Conjunctivae normal.   Cardiovascular:      Rate and Rhythm: Normal rate and regular rhythm.      Pulses:           Carotid pulses are 2+ on the left side.     Heart sounds: Normal heart sounds.   Pulmonary:      Effort: Pulmonary effort is normal. No respiratory distress.      Breath sounds: Normal breath sounds. No wheezing, rhonchi or rales.   Musculoskeletal:         General: No swelling, deformity or signs of injury.        Arms:       Comments: Mild Non-pitting edema to right lower extremity    Lymphadenopathy:      Cervical: No cervical adenopathy.   Skin:     General: Skin is warm and dry.   Neurological:      General: No focal deficit present.      Mental Status: She is alert and oriented to person, place, and time.   Psychiatric:         Mood and Affect: Mood normal.         Behavior: Behavior normal.       Past Medical History:   Diagnosis Date   • Angina    • " Arrhythmia    • ASTHMA    • Bronchitis    • Cardiac arrhythmia    • Chickenpox    • Influenza    • Kidney, horseshoe    • [place]'s syndrome    • Other acquired deformity of toe    • Personal history of venous thrombosis and embolism     states when she was little she had problems clotting   • Pneumonia    • PSVT (paroxysmal supraventricular tachycardia) (HCC)    • S/P ablation of ventricular arrhythmia     x6 (most recent was 2006)   • Ramirez-Parkinson-White syndrome    • WPW (Marisela-Parkinson-White syndrome)       Past Surgical History:   Procedure Laterality Date   • PELVISCOPY N/A 9/9/2018    Procedure: PELVISCOPY;  Surgeon: Dianne Martinez M.D.;  Location: SURGERY [facility];  Service: Gyn Robotic   • OOPHORECTOMY Left 9/9/2018    Procedure: SALPINGOOPHORECTOMY;  Surgeon: Dianne Martinez M.D.;  Location: SURGERY [facility];  Service: Gyn Robotic   • REPEAT C SECTION  6/18/2014    Performed by Corinne E Capurro, M.D. at LABOR AND DELIVERY   • GYN SURGERY  2007    csection   • OTHER  2003    L BREAST LUMPECTOMY   • BLANCA BY LAPAROSCOPY     • CHOLECYSTECTOMY     • OTHER      gallbladder removed   • OTHER      cardiac ablations x 6   • OTHER CARDIAC SURGERY      6 ablation   • PRIMARY C SECTION     • ZZZ IMPLANTABLE CARDIOVERTER DEFIBRILLATOR (ICD)        Social History     Socioeconomic History   • Marital status:      Spouse name: Not on file   • Number of children: Not on file   • Years of education: Not on file   • Highest education level: Not on file   Occupational History   • Not on file   Social Needs   • Financial resource strain: Not on file   • Food insecurity     Worry: Not on file     Inability: Not on file   • Transportation needs     Medical: Not on file     Non-medical: Not on file   Tobacco Use   • Smoking status: Never Smoker   • Smokeless tobacco: Never Used   Substance and Sexual Activity   • Alcohol use: No     Alcohol/week: 0.0 oz   • Drug use: No   • Sexual activity: Yes      Partners: Male     Birth control/protection: Surgical   Lifestyle   • Physical activity     Days per week: Not on file     Minutes per session: Not on file   • Stress: Not on file   Relationships   • Social connections     Talks on phone: Not on file     Gets together: Not on file     Attends Christianity service: Not on file     Active member of club or organization: Not on file     Attends meetings of clubs or organizations: Not on file     Relationship status: Not on file   • Intimate partner violence     Fear of current or ex partner: Not on file     Emotionally abused: Not on file     Physically abused: Not on file     Forced sexual activity: Not on file   Other Topics Concern   • Not on file   Social History Narrative   • Not on file    Inapsine [droperidol]; Keflex; Morphine; and Penicillins        DX-CHEST  FINDINGS:  Cardiomediastinal contour is within normal limits.  No focal pulmonary consolidation.  No pleural fluid collection or pneumothorax.  No major bony abnormality is seen.     IMPRESSION:     No acute cardiopulmonary disease.     EKG Interpretation:  Interpreted by me    Rhythm:  Sinus bradycardia with RBBB and LAFB    Rate: 54  ST Segments: no acute change  T Waves: no acute change  Q Waves: none  Clinical Impression: abnormal EKG without acute changes       Assessment/Plan:     1. Chest tightness    - DX-CHEST-2 VIEWS; Future  - EKG - Clinic Performed    2. Other chest pain    - CT-CHEST (THORAX) WITH; Future    3. Right leg swelling    Chest x-ray radiology interpretation above which was normal.  EKG showed no acute changes.  Consulted with Dr. Davis (Chief of Urgent Care) who recommended CT-chest with.     Ordered STAT CT chest with scheduled for tomorrow morning.  Will call patient with results and further treatment or evaluation.     Instructed patient to call cardiologist for follow-up appointment for further evaluation and treatment.     The patient is stable and otherwise well-appearing in no  acute distress, no respiratory distress.  Normal vital signs.  Suspicions for pulmonary or cardiac emergent pathology are low.    In the meantime, highly advised patient to present to the emergency room with any increased chest pain, shortness of breath, cough, lightheadedness, dizziness, fever, chills, severe wheezing, vision changes, headache, or any other concerns.  Patient understood and agreed to plan of care.    Supportive care, differential diagnoses, and indications for immediate follow-up discussed with patient.    Pathogenesis of diagnosis discussed including typical length and natural progression. Patient expresses understanding and agrees to plan.

## 2020-03-24 ENCOUNTER — TELEPHONE (OUTPATIENT)
Dept: URGENT CARE | Facility: CLINIC | Age: 40
End: 2020-03-24

## 2020-03-24 ENCOUNTER — HOSPITAL ENCOUNTER (OUTPATIENT)
Dept: RADIOLOGY | Facility: MEDICAL CENTER | Age: 40
End: 2020-03-24
Attending: PHYSICIAN ASSISTANT
Payer: COMMERCIAL

## 2020-03-24 DIAGNOSIS — R07.89 OTHER CHEST PAIN: ICD-10-CM

## 2020-03-24 DIAGNOSIS — J45.20 MILD INTERMITTENT ASTHMA WITHOUT COMPLICATION: ICD-10-CM

## 2020-03-24 PROCEDURE — 700117 HCHG RX CONTRAST REV CODE 255: Performed by: PHYSICIAN ASSISTANT

## 2020-03-24 PROCEDURE — 71275 CT ANGIOGRAPHY CHEST: CPT

## 2020-03-24 RX ORDER — IPRATROPIUM BROMIDE AND ALBUTEROL SULFATE 2.5; .5 MG/3ML; MG/3ML
3 SOLUTION RESPIRATORY (INHALATION) 4 TIMES DAILY
Qty: 30 BULLET | Refills: 0 | Status: SHIPPED | OUTPATIENT
Start: 2020-03-24 | End: 2020-08-13

## 2020-03-24 RX ADMIN — IOHEXOL 80 ML: 350 INJECTION, SOLUTION INTRAVENOUS at 09:45

## 2020-03-24 NOTE — TELEPHONE ENCOUNTER
Spoke to patient on the phone.  She states she is feeling okay and actually feels a little bit better than she did yesterday.  Discussed chest CT results which showed:     COMPARISON: CT abdomen and pelvis 8/28/12     FINDINGS:  There is no evidence of pulmonary embolism.     There is no lymphadenopathy.     There is no pleural effusion and no pericardial effusion.     Lungs demonstrate no pneumonia or pneumothorax. There is a tiny possible 3 mm nodule in the left lower hemithorax at the level of the diaphragm. This is unchanged.     Limited imaging of the upper abdomen demonstrates an enlarged spleen measuring 14.1 cm AP. There are a few scattered lymph nodes surrounding the celiac axis and SMA and in the gastrohepatic ligament. These are unchanged. Gallbladder is surgically absent   without biliary dilatation.     There is no acute bony process.        IMPRESSION:     1.  There is no CT evidence of acute pulmonary embolism.  2.  Tiny nonspecific probably postinflammatory 3 mm left lower lobe lung nodule, unchanged.  3.  There is new mild splenomegaly.  4.  Stable small nonspecific nonenlarged lymph nodes in the upper abdomen.      Discussed with patient unknown etiology of her chest tightness.  Recommended she rest, plenty of fluids, ER precautions.  Follow up with PCP and Cardiologist for any further evaluation or therapy.  Patient understood and agreed to plan of care.  She had no further questions.  We will refill her nebulizer solution bullets for her history of asthma.

## 2020-04-01 ENCOUNTER — TELEPHONE (OUTPATIENT)
Dept: HEALTH INFORMATION MANAGEMENT | Facility: OTHER | Age: 40
End: 2020-04-01

## 2020-04-01 ENCOUNTER — HOSPITAL ENCOUNTER (OUTPATIENT)
Facility: MEDICAL CENTER | Age: 40
End: 2020-04-01
Payer: COMMERCIAL

## 2020-04-01 ENCOUNTER — HOSPITAL ENCOUNTER (EMERGENCY)
Facility: MEDICAL CENTER | Age: 40
End: 2020-04-01
Attending: EMERGENCY MEDICINE
Payer: COMMERCIAL

## 2020-04-01 ENCOUNTER — OFFICE VISIT (OUTPATIENT)
Dept: URGENT CARE | Facility: CLINIC | Age: 40
End: 2020-04-01
Payer: COMMERCIAL

## 2020-04-01 ENCOUNTER — APPOINTMENT (OUTPATIENT)
Dept: RADIOLOGY | Facility: MEDICAL CENTER | Age: 40
End: 2020-04-01
Attending: EMERGENCY MEDICINE
Payer: COMMERCIAL

## 2020-04-01 ENCOUNTER — HOSPITAL ENCOUNTER (OUTPATIENT)
Facility: MEDICAL CENTER | Age: 40
End: 2020-04-01
Attending: PHYSICIAN ASSISTANT
Payer: COMMERCIAL

## 2020-04-01 VITALS
RESPIRATION RATE: 18 BRPM | BODY MASS INDEX: 31.92 KG/M2 | DIASTOLIC BLOOD PRESSURE: 72 MMHG | TEMPERATURE: 96.5 F | HEART RATE: 77 BPM | WEIGHT: 187 LBS | SYSTOLIC BLOOD PRESSURE: 140 MMHG | OXYGEN SATURATION: 94 % | HEIGHT: 64 IN

## 2020-04-01 VITALS
OXYGEN SATURATION: 97 % | RESPIRATION RATE: 19 BRPM | SYSTOLIC BLOOD PRESSURE: 113 MMHG | DIASTOLIC BLOOD PRESSURE: 70 MMHG | HEART RATE: 78 BPM

## 2020-04-01 DIAGNOSIS — J02.8 PHARYNGITIS DUE TO OTHER ORGANISM: ICD-10-CM

## 2020-04-01 DIAGNOSIS — R53.83 OTHER FATIGUE: ICD-10-CM

## 2020-04-01 DIAGNOSIS — K52.9 COLITIS: ICD-10-CM

## 2020-04-01 DIAGNOSIS — R10.12 LEFT UPPER QUADRANT ABDOMINAL PAIN: ICD-10-CM

## 2020-04-01 DIAGNOSIS — R05.9 COUGH: ICD-10-CM

## 2020-04-01 LAB
ALBUMIN SERPL BCP-MCNC: 4.9 G/DL (ref 3.2–4.9)
ALBUMIN/GLOB SERPL: 1.8 G/DL
ALP SERPL-CCNC: 56 U/L (ref 30–99)
ALT SERPL-CCNC: 20 U/L (ref 2–50)
ANION GAP SERPL CALC-SCNC: 16 MMOL/L (ref 7–16)
APPEARANCE UR: CLEAR
AST SERPL-CCNC: 12 U/L (ref 12–45)
BASOPHILS # BLD AUTO: 0.6 % (ref 0–1.8)
BASOPHILS # BLD: 0.07 K/UL (ref 0–0.12)
BILIRUB SERPL-MCNC: 2 MG/DL (ref 0.1–1.5)
BILIRUB UR STRIP-MCNC: NORMAL MG/DL
BUN SERPL-MCNC: 6 MG/DL (ref 8–22)
CALCIUM SERPL-MCNC: 9.7 MG/DL (ref 8.5–10.5)
CHLORIDE SERPL-SCNC: 105 MMOL/L (ref 96–112)
CO2 SERPL-SCNC: 20 MMOL/L (ref 20–33)
COLOR UR AUTO: NORMAL
COVID ORDER STATUS COVID19: NORMAL
CREAT SERPL-MCNC: 0.35 MG/DL (ref 0.5–1.4)
EOSINOPHIL # BLD AUTO: 0.24 K/UL (ref 0–0.51)
EOSINOPHIL NFR BLD: 2.2 % (ref 0–6.9)
ERYTHROCYTE [DISTWIDTH] IN BLOOD BY AUTOMATED COUNT: 43.8 FL (ref 35.9–50)
FLUAV+FLUBV AG SPEC QL IA: NEGATIVE
GLOBULIN SER CALC-MCNC: 2.7 G/DL (ref 1.9–3.5)
GLUCOSE SERPL-MCNC: 94 MG/DL (ref 65–99)
GLUCOSE UR STRIP.AUTO-MCNC: NORMAL MG/DL
HCT VFR BLD AUTO: 44.2 % (ref 37–47)
HETEROPH AB SER QL LA: NEGATIVE
HGB BLD-MCNC: 15.9 G/DL (ref 12–16)
IMM GRANULOCYTES # BLD AUTO: 0.12 K/UL (ref 0–0.11)
IMM GRANULOCYTES NFR BLD AUTO: 1.1 % (ref 0–0.9)
INT CON NEG: NEGATIVE
INT CON POS: POSITIVE
KETONES UR STRIP.AUTO-MCNC: NORMAL MG/DL
LDH SERPL L TO P-CCNC: 148 U/L (ref 107–266)
LEUKOCYTE ESTERASE UR QL STRIP.AUTO: NORMAL
LYMPHOCYTES # BLD AUTO: 2.7 K/UL (ref 1–4.8)
LYMPHOCYTES NFR BLD: 24.8 % (ref 22–41)
MCH RBC QN AUTO: 30.9 PG (ref 27–33)
MCHC RBC AUTO-ENTMCNC: 36 G/DL (ref 33.6–35)
MCV RBC AUTO: 85.8 FL (ref 81.4–97.8)
MONOCYTES # BLD AUTO: 1.02 K/UL (ref 0–0.85)
MONOCYTES NFR BLD AUTO: 9.4 % (ref 0–13.4)
NEUTROPHILS # BLD AUTO: 6.75 K/UL (ref 2–7.15)
NEUTROPHILS NFR BLD: 61.9 % (ref 44–72)
NITRITE UR QL STRIP.AUTO: NORMAL
NRBC # BLD AUTO: 0 K/UL
NRBC BLD-RTO: 0 /100 WBC
PH UR STRIP.AUTO: 5.5 [PH] (ref 5–8)
PLATELET # BLD AUTO: 257 K/UL (ref 164–446)
PMV BLD AUTO: 11.4 FL (ref 9–12.9)
POTASSIUM SERPL-SCNC: 4.3 MMOL/L (ref 3.6–5.5)
PROT SERPL-MCNC: 7.6 G/DL (ref 6–8.2)
PROT UR QL STRIP: NORMAL MG/DL
RBC # BLD AUTO: 5.15 M/UL (ref 4.2–5.4)
RBC UR QL AUTO: NORMAL
S PYO AG THROAT QL: NEGATIVE
SODIUM SERPL-SCNC: 141 MMOL/L (ref 135–145)
SP GR UR STRIP.AUTO: 1.02
UROBILINOGEN UR STRIP-MCNC: 0.2 MG/DL
WBC # BLD AUTO: 10.9 K/UL (ref 4.8–10.8)

## 2020-04-01 PROCEDURE — 86308 HETEROPHILE ANTIBODY SCREEN: CPT | Performed by: PHYSICIAN ASSISTANT

## 2020-04-01 PROCEDURE — 99214 OFFICE O/P EST MOD 30 MIN: CPT | Performed by: PHYSICIAN ASSISTANT

## 2020-04-01 PROCEDURE — 74177 CT ABD & PELVIS W/CONTRAST: CPT

## 2020-04-01 PROCEDURE — 700117 HCHG RX CONTRAST REV CODE 255: Performed by: EMERGENCY MEDICINE

## 2020-04-01 PROCEDURE — 81002 URINALYSIS NONAUTO W/O SCOPE: CPT | Performed by: PHYSICIAN ASSISTANT

## 2020-04-01 PROCEDURE — 83615 LACTATE (LD) (LDH) ENZYME: CPT

## 2020-04-01 PROCEDURE — 85025 COMPLETE CBC W/AUTO DIFF WBC: CPT

## 2020-04-01 PROCEDURE — 87880 STREP A ASSAY W/OPTIC: CPT | Performed by: PHYSICIAN ASSISTANT

## 2020-04-01 PROCEDURE — 87804 INFLUENZA ASSAY W/OPTIC: CPT | Performed by: PHYSICIAN ASSISTANT

## 2020-04-01 PROCEDURE — 71045 X-RAY EXAM CHEST 1 VIEW: CPT

## 2020-04-01 PROCEDURE — 80053 COMPREHEN METABOLIC PANEL: CPT

## 2020-04-01 PROCEDURE — U0002 COVID-19 LAB TEST NON-CDC: HCPCS

## 2020-04-01 PROCEDURE — 99284 EMERGENCY DEPT VISIT MOD MDM: CPT

## 2020-04-01 RX ORDER — METRONIDAZOLE 500 MG/1
500 TABLET ORAL 3 TIMES DAILY
Qty: 21 TAB | Refills: 0 | Status: SHIPPED | OUTPATIENT
Start: 2020-04-01 | End: 2020-04-20 | Stop reason: SDUPTHER

## 2020-04-01 RX ADMIN — IOHEXOL 100 ML: 350 INJECTION, SOLUTION INTRAVENOUS at 15:48

## 2020-04-01 ASSESSMENT — ENCOUNTER SYMPTOMS
COUGH: 1
VOMITING: 0
FEVER: 0
SORE THROAT: 1
NAUSEA: 0
WHEEZING: 0
CHILLS: 0
EYE REDNESS: 0
CONSTIPATION: 0
DIZZINESS: 0
ABDOMINAL PAIN: 1
BACK PAIN: 1
SPUTUM PRODUCTION: 1
NECK PAIN: 0
SINUS PAIN: 0
EYE DISCHARGE: 0
BLOOD IN STOOL: 0
DIARRHEA: 1
SHORTNESS OF BREATH: 1
MYALGIAS: 1

## 2020-04-01 ASSESSMENT — FIBROSIS 4 INDEX: FIB4 SCORE: 0.51

## 2020-04-01 NOTE — ED PROVIDER NOTES
"ED Provider Note    CHIEF COMPLAINT  Chief Complaint   Patient presents with   • Abdominal Pain     PT REPORTS SEEN AT URGENT CARE AND THEY FOUND \"SOMETHING WEIRD ON MY CHEST CT. THEY TOLD ME TO COME HERE FOR AN ABD CT.\" PT ALSO TOLD THAT SHE IS COVID POSITIVE PRESUMABLY.        HPI  Heidi Gaspar is a 39 y.o. female who presents for evaluation of abdominal pain, crampy discomfort.  The patient apparently has had a mild cold but no high fevers productive cough myalgias.  She has no high risk travel areas for C 19 hotspot or had any known positive contacts.  She denies any smell or taste discrepancy.  No associated headache.  She reports crampy upper and lower abdominal pain primarily on the left side.  Nothing seems to make it better or worse    REVIEW OF SYSTEMS  See HPI for further details.  No night sweats weight loss numbness tingling weakness rash all other systems are negative.     PAST MEDICAL HISTORY  Past Medical History:   Diagnosis Date   • Angina    • Arrhythmia    • ASTHMA    • Bronchitis    • Cardiac arrhythmia    • Chickenpox    • Influenza    • Kidney, horseshoe    • Cruz's syndrome    • Other acquired deformity of toe    • Personal history of venous thrombosis and embolism     states when she was little she had problems clotting   • Pneumonia    • PSVT (paroxysmal supraventricular tachycardia) ([facility])    • S/P ablation of ventricular arrhythmia     x6 (most recent was 2006)   • Ramirez-Parkinson-White syndrome    • WPW (Marisela-Parkinson-White syndrome)        FAMILY HISTORY  Noncontributory    SOCIAL HISTORY  Social History     Socioeconomic History   • Marital status:      Spouse name: Not on file   • Number of children: Not on file   • Years of education: Not on file   • Highest education level: Not on file   Occupational History   • Not on file   Social Needs   • Financial resource strain: Not on file   • Food insecurity     Worry: Not on file     Inability: Not on file   • Transportation " needs     Medical: Not on file     Non-medical: Not on file   Tobacco Use   • Smoking status: Never Smoker   • Smokeless tobacco: Never Used   Substance and Sexual Activity   • Alcohol use: No     Alcohol/week: 0.0 oz   • Drug use: No   • Sexual activity: Yes     Partners: Male     Birth control/protection: Surgical   Lifestyle   • Physical activity     Days per week: Not on file     Minutes per session: Not on file   • Stress: Not on file   Relationships   • Social connections     Talks on phone: Not on file     Gets together: Not on file     Attends Sikhism service: Not on file     Active member of club or organization: Not on file     Attends meetings of clubs or organizations: Not on file     Relationship status: Not on file   • Intimate partner violence     Fear of current or ex partner: Not on file     Emotionally abused: Not on file     Physically abused: Not on file     Forced sexual activity: Not on file   Other Topics Concern   • Not on file   Social History Narrative   • Not on file       SURGICAL HISTORY  Past Surgical History:   Procedure Laterality Date   • PELVISCOPY N/A 9/9/2018    Procedure: PELVISCOPY;  Surgeon: Dianne Martinez M.D.;  Location: SURGERY [facility];  Service: Gyn Robotic   • OOPHORECTOMY Left 9/9/2018    Procedure: SALPINGOOPHORECTOMY;  Surgeon: Dianne Martinez M.D.;  Location: SURGERY [facility];  Service: Gyn Robotic   • REPEAT C SECTION  6/18/2014    Performed by Corinne E Capurro, M.D. at LABOR AND DELIVERY   • GYN SURGERY  2007    csection   • OTHER  2003    L BREAST LUMPECTOMY   • BLANCA BY LAPAROSCOPY     • CHOLECYSTECTOMY     • OTHER      gallbladder removed   • OTHER      cardiac ablations x 6   • OTHER CARDIAC SURGERY      6 ablation   • PRIMARY C SECTION     • ZZZ IMPLANTABLE CARDIOVERTER DEFIBRILLATOR (ICD)         CURRENT MEDICATIONS  Home Medications    **Home medications have not yet been reviewed for this encounter**       No current facility-administered  medications for this encounter.     Current Outpatient Medications:   •  ipratropium-albuterol (DUONEB) 0.5-2.5 (3) MG/3ML nebulizer solution, 3 mL by Nebulization route 4 times a day., Disp: 30 Bullet, Rfl: 0  •  methylPREDNISolone (MEDROL DOSEPAK) 4 MG Tablet Therapy Pack, As directed on the packaging label. (Patient not taking: Reported on 4/1/2020), Disp: 21 Tab, Rfl: 0    ALLERGIES  Allergies   Allergen Reactions   • Inapsine [Droperidol] Anaphylaxis   • Keflex Rash and Swelling     RASH & SWELLING   • Morphine Unspecified     STOPS PEEING     • Penicillins Rash and Swelling     RASH & SWELLING       PHYSICAL EXAM  VITAL SIGNS: /70   Pulse 78   Resp 19   SpO2 97%       Constitutional: Well developed, Well nourished, No acute distress, Non-toxic appearance.   HENT: Normocephalic, Atraumatic, Bilateral external ears normal, Oropharynx moist, No oral exudates, Nose normal.   Eyes: PERRLA, EOMI, Conjunctiva normal, No discharge.   Neck: Normal range of motion, No tenderness, Supple, No stridor.   Cardiovascular: Normal heart rate, Normal rhythm, No murmurs, No rubs, No gallops.   Thorax & Lungs: Normal breath sounds, No respiratory distress, No wheezing, No chest tenderness.   Abdomen: Bowel sounds normal, Soft, moderate tenderness in the left upper quadrant and left lower quadrant  Skin: Warm, Dry, No erythema, No rash.   Back: No tenderness, No CVA tenderness.   Extremities: Intact distal pulses, No edema, No tenderness, No cyanosis, No clubbing.   Neurologic: Alert & oriented x 3, Normal motor function, Normal sensory function, No focal deficits noted.   Psychiatric: Anxious     Results for orders placed or performed during the hospital encounter of 04/01/20   CBC WITH DIFFERENTIAL   Result Value Ref Range    WBC 10.9 (H) 4.8 - 10.8 K/uL    RBC 5.15 4.20 - 5.40 M/uL    Hemoglobin 15.9 12.0 - 16.0 g/dL    Hematocrit 44.2 37.0 - 47.0 %    MCV 85.8 81.4 - 97.8 fL    MCH 30.9 27.0 - 33.0 pg    MCHC 36.0 (H)  33.6 - 35.0 g/dL    RDW 43.8 35.9 - 50.0 fL    Platelet Count 257 164 - 446 K/uL    MPV 11.4 9.0 - 12.9 fL    Neutrophils-Polys 61.90 44.00 - 72.00 %    Lymphocytes 24.80 22.00 - 41.00 %    Monocytes 9.40 0.00 - 13.40 %    Eosinophils 2.20 0.00 - 6.90 %    Basophils 0.60 0.00 - 1.80 %    Immature Granulocytes 1.10 (H) 0.00 - 0.90 %    Nucleated RBC 0.00 /100 WBC    Neutrophils (Absolute) 6.75 2.00 - 7.15 K/uL    Lymphs (Absolute) 2.70 1.00 - 4.80 K/uL    Monos (Absolute) 1.02 (H) 0.00 - 0.85 K/uL    Eos (Absolute) 0.24 0.00 - 0.51 K/uL    Baso (Absolute) 0.07 0.00 - 0.12 K/uL    Immature Granulocytes (abs) 0.12 (H) 0.00 - 0.11 K/uL    NRBC (Absolute) 0.00 K/uL   Comp Metabolic Panel   Result Value Ref Range    Sodium 141 135 - 145 mmol/L    Potassium 4.3 3.6 - 5.5 mmol/L    Chloride 105 96 - 112 mmol/L    Co2 20 20 - 33 mmol/L    Anion Gap 16.0 7.0 - 16.0    Glucose 94 65 - 99 mg/dL    Bun 6 (L) 8 - 22 mg/dL    Creatinine 0.35 (L) 0.50 - 1.40 mg/dL    Calcium 9.7 8.5 - 10.5 mg/dL    AST(SGOT) 12 12 - 45 U/L    ALT(SGPT) 20 2 - 50 U/L    Alkaline Phosphatase 56 30 - 99 U/L    Total Bilirubin 2.0 (H) 0.1 - 1.5 mg/dL    Albumin 4.9 3.2 - 4.9 g/dL    Total Protein 7.6 6.0 - 8.2 g/dL    Globulin 2.7 1.9 - 3.5 g/dL    A-G Ratio 1.8 g/dL   LDH   Result Value Ref Range    LDH Total 148 107 - 266 U/L   ESTIMATED GFR   Result Value Ref Range    GFR If African American >60 >60 mL/min/1.73 m 2    GFR If Non African American >60 >60 mL/min/1.73 m 2       CT-ABDOMEN-PELVIS WITH   Final Result         1. Mild long segment wall thickening from the cecum to the descending colon. Mild adjacent fat stranding in the proximal transverse colon. The finding is consistent with colitis.      2. Mild splenomegaly, unchanged since prior.      DX-CHEST-PORTABLE (1 VIEW)   Final Result         No acute cardiac or pulmonary abnormality is identified.          COURSE & MEDICAL DECISION MAKING  Pertinent Labs & Imaging studies reviewed. (See chart  for details)  An IV was established.  Patient did not have any significant leukocytosis or bandemia.  Chest x-ray is clear without any suggestion of viral pneumonia LDH is also low and she does not have high fever or hypoxia to strongly suggest C 19.  Patient does indeed have colitis without suggestion of complication.  I will start her on Flagyl.  Apparently she has ignificant allergies.  I did advise her to return as needed for new or worsening symptoms    FINAL IMPRESSION  1.  Acute colitis      Electronically signed by: Jamin Robles M.D., 4/1/2020 4:30 PM

## 2020-04-01 NOTE — TELEPHONE ENCOUNTER
1. Caller Name: Heidi                       Call Back Number:   [facility] PCP or Specialty Provider:          2.  Does patient have any active symptoms of respiratory illness (fever OR cough OR shortness of breath OR sore throat)? Yes, the patient reports the following respiratory symptoms: cough, shortness of breath and sore throat.    3.  Does patient have any comoribidities?     4.  Has the patient traveled in the last 14 days OR had any known contact with someone who is suspected or confirmed to have COVID-19?  No.    5. Disposition: Advised to go to ED or call 9-1-1 Patient was seen at [facility] and was told to go to the ER for suspicion of enlarge spleen and abdominal pain. She had a CT of her chest on 3/24/2020.  She was screened for COVID19 r/o.  I let her know she will be screened at the door but told her to let them know she was told to go to the ER    Note routed to [facility] Provider:

## 2020-04-01 NOTE — PROGRESS NOTES
Subjective:      Heidi Gaspar is a 39 y.o. female who presents with Shortness of Breath (x9 days seen here, SOB, swollen throat) and Diarrhea (x3 days)            HPI  39-year-old female with extensive past medical history including Ramirez Parkinson's White and factor V Leiden mutation presents to urgent care with new problem to provider of cough, sore throat, and generalized fatigue which has been worsening over the last 9 days. She reports associated shortness of breath.  Denies chest pain, wheezing, or fevers.  Patient also reports left-sided abdominal and flank pain that has been worsening over the last few days.  She reports associated diarrhea, she denies vomiting or blood in stools.  No urinary symptoms.   Patient was seen in urgent care on 3/23/2020.  She had a negative chest x-ray and chest CTA for acute respiratory illness.   Denies other associated aggravating or alleviating factors.     Review of Systems   Constitutional: Positive for malaise/fatigue. Negative for chills and fever.   HENT: Positive for congestion and sore throat. Negative for ear pain and sinus pain.    Eyes: Negative for discharge and redness.   Respiratory: Positive for cough, sputum production and shortness of breath. Negative for wheezing.    Cardiovascular: Negative for chest pain.   Gastrointestinal: Positive for abdominal pain and diarrhea. Negative for blood in stool, constipation, melena, nausea and vomiting.   Genitourinary: Negative for dysuria, frequency, hematuria and urgency.   Musculoskeletal: Positive for back pain and myalgias. Negative for neck pain.   Neurological: Negative for dizziness.   Endo/Heme/Allergies: Negative for environmental allergies.     Past Medical History:   Diagnosis Date   • Angina    • Arrhythmia    • ASTHMA    • Bronchitis    • Cardiac arrhythmia    • Chickenpox    • Influenza    • Kidney, horseshoe    • Cruz's syndrome    • Other acquired deformity of toe    • Personal history of venous  "thrombosis and embolism     states when she was little she had problems clotting   • Pneumonia    • PSVT (paroxysmal supraventricular tachycardia) ([facility])    • S/P ablation of ventricular arrhythmia     x6 (most recent was 2006)   • Ramirez-Parkinson-White syndrome    • WPW (Marisela-Parkinson-White syndrome)      Medications and allergies reviewed in [facility].  Social History     Tobacco Use   • Smoking status: Never Smoker   • Smokeless tobacco: Never Used   Substance Use Topics   • Alcohol use: No     Alcohol/week: 0.0 oz      Objective:     /72   Pulse 77   Temp 35.8 °C (96.5 °F) (Temporal)   Resp 18   Ht 1.626 m (5' 4\")   Wt 84.8 kg (187 lb)   SpO2 94%   BMI 32.10 kg/m²      Physical Exam  Vitals signs reviewed.   Constitutional:       General: She is not in acute distress.     Appearance: Normal appearance. She is ill-appearing.   HENT:      Head: Normocephalic and atraumatic.      Right Ear: Tympanic membrane and ear canal normal.      Left Ear: Tympanic membrane and ear canal normal.      Nose: Nose normal.      Mouth/Throat:      Mouth: Mucous membranes are moist.      Pharynx: Oropharynx is clear. Posterior oropharyngeal erythema present.   Eyes:      Conjunctiva/sclera: Conjunctivae normal.   Neck:      Musculoskeletal: Normal range of motion and neck supple.   Cardiovascular:      Rate and Rhythm: Normal rate and regular rhythm.      Pulses: Normal pulses.      Heart sounds: Normal heart sounds.   Pulmonary:      Effort: Pulmonary effort is normal.   Abdominal:      General: There is no distension.      Palpations: There is no splenomegaly.      Tenderness: There is abdominal tenderness in the left upper quadrant. There is left CVA tenderness and guarding. There is no right CVA tenderness or rebound. Negative signs include Montgomery's sign and McBurney's sign.   Skin:     Coloration: Skin is pale.   Neurological:      General: No focal deficit present.      Mental Status: She is alert and oriented to " person, place, and time.   Psychiatric:         Mood and Affect: Mood normal.         Behavior: Behavior normal.         Thought Content: Thought content normal.         Judgment: Judgment normal.             Assessment/Plan:     1. Other fatigue  POCT Influenza A/B    POCT Urinalysis    POCT Mononucleosis (mono)    COVID/SARS CoV-2    Droplet, Contact, and Eye Protection   2. Pharyngitis due to other organism  POCT Rapid Strep A    COVID/SARS CoV-2    Droplet, Contact, and Eye Protection   3. Cough  COVID/SARS CoV-2    Droplet, Contact, and Eye Protection   4. Left upper quadrant abdominal pain  CT-ABDOMEN-PELVIS WITH     Results for orders placed or performed in visit on 04/01/20   POCT Influenza A/B   Result Value Ref Range    Rapid Influenza A-B NEGATIVE     Internal Control Positive Positive     Internal Control Negative Negative    POCT Urinalysis   Result Value Ref Range    POC Color orange Negative    POC Appearance clear Negative    POC Leukocyte Esterase neg Negative    POC Nitrites neg Negative    POC Urobiligen 0.2 Negative (0.2) mg/dL    POC Protein neg Negative mg/dL    POC Urine PH 5.5 5.0 - 8.0    POC Blood trace Negative    POC Specific Gravity 1.025 <1.005 - >1.030    POC Ketones neg Negative mg/dL    POC Bilirubin neg Negative mg/dL    POC Glucose neg Negative mg/dL   POCT Mononucleosis (mono)   Result Value Ref Range    Heterophile Screen negative     Internal Control Positive Positive     Internal Control Negative Negative    POCT Rapid Strep A   Result Value Ref Range    Rapid Strep Screen negative     Internal Control Positive Positive     Internal Control Negative Negative      Unable to order outpatient CT for evaluation of patient's abdominal pain.   Patient was seen in urgent care 3/23/2020, she had a negative chest x-ray and pulmonary CTA.   Patient returns today for worsening of her respiratory symptoms and new onset abdominal pain over the last few days. Patient reports associated  diarrhea. No vomiting, blood in stools, or urinary symptoms.   With patient's underlying medical history including WPW and Factor 5 Leiden mutation and her worsening symptoms; I recommend patient proceed to ED for further evaluation and higher level of medical care.   I attempted to schedule outpatient CT for evaluation, however due to her respiratory symptoms she was not able to be scheduled.     I spoke with the charge nurse at [facility] ED, who directed me to the transfer center. After speaking with them, I was directed back to the emergency department.   I spoke with Dr. Hill who accepts patient transfer to the emergency department.     Patient is aware of this plan and will proceed to emergency department per private vehicle. She is in no acute distress on discharge from urgent care and her vital signs are stable.

## 2020-04-02 ENCOUNTER — PATIENT MESSAGE (OUTPATIENT)
Dept: MEDICAL GROUP | Facility: PHYSICIAN GROUP | Age: 40
End: 2020-04-02

## 2020-04-02 DIAGNOSIS — K52.9 COLITIS, ACUTE: ICD-10-CM

## 2020-04-02 LAB
SARS-COV-2 RNA SPEC QL NAA+PROBE: NOT DETECTED
SPECIMEN SOURCE: NORMAL

## 2020-04-02 RX ORDER — AZITHROMYCIN 500 MG/1
1000 TABLET, FILM COATED ORAL DAILY
Qty: 6 TAB | Refills: 0 | Status: SHIPPED | OUTPATIENT
Start: 2020-04-02 | End: 2020-04-20 | Stop reason: SDUPTHER

## 2020-04-02 NOTE — PATIENT COMMUNICATION
She is still not feeling well. Went to UC, 10 days still not feeling well. Negative for strep, flu, mono, took a COVID sample. States she was tender to palpation of abd, went to ER and was diagnosed with colitis.  Counseled patient regarding results of CT scan as patient stated that she did not quite understand what happened while she was in the emergency room.  Discussed with patient options for dual therapy antibiotics as patient is afraid to take Cipro related to family history of severe reaction.  Per up-to-date azithromycin is recommended alternative as such this is provided to patient as well as patient is recommended to take metronidazole.  Discussed chest x-ray and provided reassurance to patient.  [facility] will [facility] a virtual visit in 1 to 2 weeks.  Or sooner as needed.

## 2020-04-07 ENCOUNTER — OFFICE VISIT (OUTPATIENT)
Dept: URGENT CARE | Facility: CLINIC | Age: 40
End: 2020-04-07
Payer: COMMERCIAL

## 2020-04-07 ENCOUNTER — APPOINTMENT (OUTPATIENT)
Dept: RADIOLOGY | Facility: IMAGING CENTER | Age: 40
End: 2020-04-07
Attending: PHYSICIAN ASSISTANT
Payer: COMMERCIAL

## 2020-04-07 VITALS
HEIGHT: 64 IN | RESPIRATION RATE: 18 BRPM | TEMPERATURE: 97.6 F | OXYGEN SATURATION: 96 % | WEIGHT: 185.6 LBS | SYSTOLIC BLOOD PRESSURE: 140 MMHG | BODY MASS INDEX: 31.69 KG/M2 | DIASTOLIC BLOOD PRESSURE: 76 MMHG | HEART RATE: 106 BPM

## 2020-04-07 DIAGNOSIS — Z20.822 EXPOSURE TO COVID-19 VIRUS: ICD-10-CM

## 2020-04-07 DIAGNOSIS — J06.9 URI WITH COUGH AND CONGESTION: ICD-10-CM

## 2020-04-07 DIAGNOSIS — J06.9 URI WITH COUGH AND CONGESTION: Primary | ICD-10-CM

## 2020-04-07 PROCEDURE — 71046 X-RAY EXAM CHEST 2 VIEWS: CPT | Mod: TC | Performed by: PHYSICIAN ASSISTANT

## 2020-04-07 PROCEDURE — 99214 OFFICE O/P EST MOD 30 MIN: CPT | Performed by: PHYSICIAN ASSISTANT

## 2020-04-07 RX ORDER — METHYLPREDNISOLONE 4 MG/1
TABLET ORAL
Qty: 21 TAB | Refills: 0 | Status: SHIPPED | OUTPATIENT
Start: 2020-04-07 | End: 2020-08-13

## 2020-04-07 RX ORDER — PROMETHAZINE HYDROCHLORIDE AND CODEINE PHOSPHATE 6.25; 1 MG/5ML; MG/5ML
5 SYRUP ORAL 4 TIMES DAILY PRN
Qty: 120 ML | Refills: 0 | Status: SHIPPED | OUTPATIENT
Start: 2020-04-07 | End: 2020-04-14

## 2020-04-07 RX ORDER — ALBUTEROL SULFATE 90 UG/1
2 AEROSOL, METERED RESPIRATORY (INHALATION) EVERY 6 HOURS PRN
Qty: 8.5 G | Refills: 0 | Status: SHIPPED | OUTPATIENT
Start: 2020-04-07 | End: 2020-04-17

## 2020-04-07 ASSESSMENT — FIBROSIS 4 INDEX: FIB4 SCORE: 0.43

## 2020-04-08 ENCOUNTER — PATIENT MESSAGE (OUTPATIENT)
Dept: MEDICAL GROUP | Facility: PHYSICIAN GROUP | Age: 40
End: 2020-04-08

## 2020-04-08 NOTE — PROGRESS NOTES
Subjective:      Pt is a 39 y.o. female who presents with Cough (x4 days was at the ER 6 days)            HPI  This is a new problem. PT presents to  clinic today complaining of sore throat, fevers, chills, body aches, watery eyes, pressure in ears, cough, fatigue, runny nose. Pt notes in ER next to COVID-19 suspected pt who were intubated 6 days ago. PT denies CP, SOB, NVD, abdominal pain, joint pain. PT states these symptoms began around 4 days ago. PT states the pain is a 7/10, aching in nature and worse at night.  Pt has not taken any RX medications for this condition. The pt's medication list, problem list, and allergies have been evaluated and reviewed during today's visit.      PMH:  Past Medical History:   Diagnosis Date   • Angina    • Arrhythmia    • ASTHMA    • Bronchitis    • Cardiac arrhythmia    • Chickenpox    • Influenza    • Kidney, horseshoe    • Cruz's syndrome    • Other acquired deformity of toe    • Personal history of venous thrombosis and embolism     states when she was little she had problems clotting   • Pneumonia    • PSVT (paroxysmal supraventricular tachycardia) (HCC)    • S/P ablation of ventricular arrhythmia     x6 (most recent was 2006)   • Ramirez-Parkinson-White syndrome    • WPW (Marisela-Parkinson-White syndrome)        PSH:  Past Surgical History:   Procedure Laterality Date   • PELVISCOPY N/A 9/9/2018    Procedure: PELVISCOPY;  Surgeon: Dianne Martinez M.D.;  Location: SURGERY [facility];  Service: Gyn Robotic   • OOPHORECTOMY Left 9/9/2018    Procedure: SALPINGOOPHORECTOMY;  Surgeon: Dianne Martinez M.D.;  Location: SURGERY [facility];  Service: Gyn Robotic   • REPEAT C SECTION  6/18/2014    Performed by Corinne E Capurro, M.D. at [facility] DELIVERY   • GYN SURGERY  2007    csection   • OTHER  2003    L BREAST LUMPECTOMY   • BLANCA BY LAPAROSCOPY     • CHOLECYSTECTOMY     • OTHER      gallbladder removed   • OTHER      cardiac ablations x 6   • OTHER CARDIAC SURGERY       6 ablation   • PRIMARY C SECTION     • ZZZ IMPLANTABLE CARDIOVERTER DEFIBRILLATOR (ICD)         Fam Hx:    family history includes Asthma in her mother and son; Diabetes in her father, maternal aunt, paternal aunt, and paternal grandmother; Lung Disease in her mother and son; Other in her maternal grandfather and mother; Psychiatric Illness in her paternal grandmother; Thyroid in her mother and sister.  Family Status   Relation Name Status   • Mo  Alive        obese   • Son  Alive   • Fa  Alive   • Sis  Alive   • Sis 6 months    • Son  Alive   • MAunt x2 Alive   • MGFa     • PAunt  (Not Specified)   • PGMo  (Not Specified)       Soc HX:  Social History     Socioeconomic History   • Marital status:      Spouse name: Not on file   • Number of children: Not on file   • Years of education: Not on file   • Highest education level: Not on file   Occupational History   • Not on file   Social Needs   • Financial resource strain: Not on file   • Food insecurity     Worry: Not on file     Inability: Not on file   • Transportation needs     Medical: Not on file     Non-medical: Not on file   Tobacco Use   • Smoking status: Never Smoker   • Smokeless tobacco: Never Used   Substance and Sexual Activity   • Alcohol use: No     Alcohol/week: 0.0 oz   • Drug use: No   • Sexual activity: Yes     Partners: Male     Birth control/protection: Surgical   Lifestyle   • Physical activity     Days per week: Not on file     Minutes per session: Not on file   • Stress: Not on file   Relationships   • Social connections     Talks on phone: Not on file     Gets together: Not on file     Attends [employer] service: Not on file     Active member of club or organization: Not on file     Attends meetings of clubs or organizations: Not on file     Relationship status: Not on file   • Intimate partner violence     Fear of current or ex partner: Not on file     Emotionally abused: Not on file     Physically abused: Not on  file     Forced sexual activity: Not on file   Other Topics Concern   • Not on file   Social History Narrative   • Not on file         Medications:    Current Outpatient Medications:   •  metroNIDAZOLE (FLAGYL) 500 MG Tab, Take 1 Tab by mouth 3 times a day for 7 days., Disp: 21 Tab, Rfl: 0  •  ipratropium-albuterol (DUONEB) 0.5-2.5 (3) MG/3ML nebulizer solution, 3 mL by Nebulization route 4 times a day., Disp: 30 Bullet, Rfl: 0  •  methylPREDNISolone (MEDROL DOSEPAK) 4 MG Tablet Therapy Pack, As directed on the packaging label. (Patient not taking: Reported on 4/1/2020), Disp: 21 Tab, Rfl: 0      Allergies:  Inapsine [droperidol]; Keflex; Morphine; and Penicillins    ROS  Constitutional: Positive for chills, fevers, body aches and malaise/fatigue.   HENT: Positive for congestion and sore throat. Negative for ear pain.    Eyes: Negative for blurred vision, double vision and photophobia.   Respiratory: Positive for cough and sputum production. Negative for hemoptysis, shortness of breath and wheezing.    Cardiovascular: Negative for chest pain and palpitations.   Gastrointestinal: Negative for nausea, vomiting, abdominal pain, diarrhea and constipation.   Genitourinary: Negative for dysuria and flank pain.   Musculoskeletal: Negative for falls and myalgias.   Skin: Negative for itching and rash.   Neurological: Positive for headaches. Negative for dizziness and tingling.   Endo/Heme/Allergies: Does not bruise/bleed easily.   Psychiatric/Behavioral: Negative for depression. The patient is not nervous/anxious.             Objective:     Vitals:    04/07/20 1742   BP: 140/76   Pulse: (!) 106   Resp: 18   Temp: 36.4 °C (97.6 °F)   SpO2: 96%              Physical Exam       Constitutional: PT is oriented to person, place, and time. PT appears well-developed and well-nourished. No distress.   HENT:   Head: Normocephalic and atraumatic.   Right Ear: Hearing, tympanic membrane, external ear and ear canal normal.   Left Ear:  Hearing, tympanic membrane, external ear and ear canal normal.   Nose: Mucosal edema, rhinorrhea and sinus tenderness present. Right sinus exhibits frontal sinus tenderness. Left sinus exhibits frontal sinus tenderness.   Mouth/Throat: Uvula is midline. Mucous membranes are pale. Posterior oropharyngeal edema and posterior oropharyngeal erythema with exudate noted on exam.   Eyes: Conjunctivae normal and EOM are normal. Pupils are equal, round, and reactive to light.   Neck: Normal range of motion. Neck supple. No thyromegaly present.   Cardiovascular: Normal rate, regular rhythm, normal heart sounds and intact distal pulses.  Exam reveals no gallop and no friction rub.    No murmur heard.  Pulmonary/Chest: Effort normal and breath sounds normal. No respiratory distress. PT has no wheezes. PT has no rales. PT exhibits no tenderness.   Abdominal: Soft. Bowel sounds are normal. PT exhibits no distension and no mass. There is no tenderness. There is no rebound and no guarding.   Musculoskeletal: Normal range of motion. PT exhibits no edema and no tenderness.   Lymphadenopathy:     PT has no cervical adenopathy.   Neurological: PT is alert and oriented to person, place, and time. PT displays normal reflexes. No cranial nerve deficit. PT exhibits normal muscle tone. Coordination normal.   Skin: Skin is warm and dry. No rash noted. No erythema.   Psychiatric: PT has a normal mood and affect. PT behavior is normal. Judgment and thought content normal.       RADS:    DX-CHEST-2 VIEWS   Order: [id number]   Status:  Final result   Visible to patient:  No (Not Released) Next appt:  None Dx:  URI with cough and congestion; Exposu...   Details     Reading Physician Reading Date Result Priority   Raulito Neal M.D.  [phone] 4/7/2020 Urgent Care      Narrative & Impression        4/7/2020 5:24 PM     HISTORY/REASON FOR EXAM:  Cough  URI with cough and congestion  Exposure to COVID-19 virus     TECHNIQUE/EXAM DESCRIPTION AND  NUMBER OF VIEWS:  Two views of the chest.     COMPARISON:  4/1/2020.     FINDINGS:     No pulmonary infiltrates or consolidations are noted.  No pleural effusions, no pneumothorax are appreciated.  Normal cardiopericardial silhouette.        IMPRESSION:        1. No active cardiopulmonary abnormalities are identified.             Last Resulted: 04/07/20  5:36 PM                    Assessment/Plan:       1. URI with cough and congestion    - DX-CHEST-2 VIEWS; Future    2. Exposure to COVID-19 virus    - DX-CHEST-2 VIEWS; Future    Medrol  Codeine cough syrup  Albuterol      As per COVID-19 Protocol:  Pt was instructed by me to go home and remain in quarantine with hand hygiene and airborne precautions x 14 days.  Pt was in full agreement and understanding of the plan and all questions were answered to the full satisfaction of the pt.  Concern for worsening symptoms of URI which shortly could transition to pneumonia and worsening sinus congestion and infection with powerful cough keeping pt up at night as they must sleep upright to avoid coughing fits.  Diff DX: Bronchitis, Sinusitis, Pneumonia, Influenza, Viral URI, Allergies  Rest, fluids encouraged.  OTC decongestant for congestion/cough  Note given for work.  AVS with medical info given.  Pt was in full understanding and agreement with the plan.  Differential diagnosis, natural history, supportive care, and indications for immediate follow-up discussed. All questions answered. Patient agrees with the plan of care.  Follow-up as needed if symptoms worsen or fail to improve to PCP, Urgent care or Emergency Room.

## 2020-04-19 ENCOUNTER — PATIENT MESSAGE (OUTPATIENT)
Dept: MEDICAL GROUP | Facility: PHYSICIAN GROUP | Age: 40
End: 2020-04-19

## 2020-04-20 ENCOUNTER — TELEMEDICINE (OUTPATIENT)
Dept: MEDICAL GROUP | Facility: PHYSICIAN GROUP | Age: 40
End: 2020-04-20
Payer: COMMERCIAL

## 2020-04-20 VITALS
SYSTOLIC BLOOD PRESSURE: 121 MMHG | HEART RATE: 81 BPM | BODY MASS INDEX: 31.41 KG/M2 | TEMPERATURE: 98.8 F | HEIGHT: 64 IN | DIASTOLIC BLOOD PRESSURE: 66 MMHG | WEIGHT: 184 LBS

## 2020-04-20 DIAGNOSIS — R05.9 COUGH: ICD-10-CM

## 2020-04-20 DIAGNOSIS — K52.9 COLITIS, ACUTE: ICD-10-CM

## 2020-04-20 DIAGNOSIS — R10.10 PAIN OF UPPER ABDOMEN: ICD-10-CM

## 2020-04-20 PROCEDURE — 99214 OFFICE O/P EST MOD 30 MIN: CPT | Mod: 95,CR | Performed by: NURSE PRACTITIONER

## 2020-04-20 RX ORDER — AZITHROMYCIN 500 MG/1
1000 TABLET, FILM COATED ORAL DAILY
Qty: 12 TAB | Refills: 0 | Status: SHIPPED | OUTPATIENT
Start: 2020-04-20 | End: 2020-04-26

## 2020-04-20 RX ORDER — ACETAMINOPHEN 500 MG
500-1000 TABLET ORAL
COMMUNITY
End: 2020-08-13

## 2020-04-20 RX ORDER — METRONIDAZOLE 500 MG/1
500 TABLET ORAL 3 TIMES DAILY
Qty: 21 TAB | Refills: 0 | Status: SHIPPED | OUTPATIENT
Start: 2020-04-20 | End: 2020-04-27

## 2020-04-20 ASSESSMENT — FIBROSIS 4 INDEX: FIB4 SCORE: 0.43

## 2020-04-20 NOTE — ASSESSMENT & PLAN NOTE
Patient continues to have pain and swelling of her upper abdomen.  States that tenderness is all across, initially it was more sore than left upper quadrant states that there is still some increased discomfort at this location.  Patient states that she notices bloating, has been having diarrhea since this started.  States that she did get significant improvement on day 3 of antibiotics but states that when she stopped the antibiotics symptoms got significantly worse.  Patient denies any blood or mucus in her stool.  Patient states that she is drinking a lot of fluids to stay hydrated.

## 2020-04-20 NOTE — PROGRESS NOTES
"Telemedicine Visit: Established Patient     This encounter was conducted via Zoom .   Verbal consent was obtained. Patient's identity was verified.    Subjective:   CC: Abdominal pain  Heidi Gaspar is a 39 y.o. female presenting for evaluation and management of:    Cough  Continued cough.  Constant \"take my breath away\" cough. States it is better from a couple weeks ago, but states it hasn't resolved. No drainage, is feeling some discomfort in her ears.  Patient states she feels this is slowly improving.    Pain of upper abdomen  Patient continues to have pain and swelling of her upper abdomen.  States that tenderness is all across, initially it was more sore than left upper quadrant states that there is still some increased discomfort at this location.  Patient states that she notices bloating, has been having diarrhea since this started.  States that she did get significant improvement on day 3 of antibiotics but states that when she stopped the antibiotics symptoms got significantly worse.  Patient denies any blood or mucus in her stool.  Patient states that she is drinking a lot of fluids to stay hydrated.        ROS     Review of Systems:   Constitutional:  Positive for fever, negative chills, weight loss and malaise/fatigue.   HEENT:  Negative for ear pain, nosebleeds, congestion, sore throat and neck pain.    Eyes:  Negative for blurred vision.   Respiratory: Positive for cough, negative sputum production, shortness of breath and wheezing.    Cardiovascular:  Negative for chest pain, palpitations, orthopnea and leg swelling.   Gastrointestinal:  Negative for heartburn, nausea, vomiting and positive abdominal pain.   Psychiatric/Behavioral:  Negative for depression, suicidal ideas and memory loss.  The patient is not nervous/anxious and does not have insomnia.    All other systems reviewed and are negative except as in HPI.    Allergies   Allergen Reactions   • Inapsine [Droperidol] Anaphylaxis   • " Keflex Rash and Swelling     RASH & SWELLING   • Morphine Unspecified     STOPS PEEING     • Penicillins Rash and Swelling     RASH & SWELLING       Current medicines (including changes today)  Current Outpatient Medications   Medication Sig Dispense Refill   • acetaminophen (TYLENOL) 500 MG Tab Take 500-1,000 mg by mouth.     • metroNIDAZOLE (FLAGYL) 500 MG Tab Take 1 Tab by mouth 3 times a day for 7 days. 21 Tab 0   • azithromycin (ZITHROMAX) 500 MG tablet Take 2 Tabs by mouth every day for 6 days. 12 Tab 0   • ipratropium-albuterol (DUONEB) 0.5-2.5 (3) MG/3ML nebulizer solution 3 mL by Nebulization route 4 times a day. 30 Bullet 0   • methylPREDNISolone (MEDROL DOSEPAK) 4 MG Tablet Therapy Pack Follow schedule on package instructions. (Patient not taking: Reported on 4/20/2020) 21 Tab 0   • methylPREDNISolone (MEDROL DOSEPAK) 4 MG Tablet Therapy Pack As directed on the packaging label. (Patient not taking: Reported on 4/1/2020) 21 Tab 0     No current facility-administered medications for this visit.        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     Priority: Medium   • WPW (Marisela-Parkinson-White syndrome) 11/30/2012     Priority: Medium   • Pain of upper abdomen 04/20/2020   • Lipoma of torso 11/13/2019   • Rib pain 11/13/2019   • PVCs (premature ventricular contractions) 10/21/2019   • Pain and swelling of right lower leg 09/30/2019   • Varicose veins of right lower extremity with pain 09/30/2019   • Left posterior fascicular block 05/06/2019   • Right bundle branch block 05/06/2019   • Torsion of left ovary and ovarian pedicle 09/09/2018   • TMJ click 05/01/2018   • Varicose vein of leg 05/01/2018   • Family history of blood clots 06/29/2017   • Fatigue 06/29/2017   • Mild intermittent asthma 06/29/2017   • Cough 04/15/2016   • S/P ablation  "of accessory bypass tract 11/30/2012       Family History   Problem Relation Age of Onset   • Asthma Mother    • Lung Disease Mother         PE   • Other Mother         clotting disease   • Thyroid Mother    • Lung Disease Son         sees pediatric pulmonologist   • Asthma Son    • Diabetes Father    • Thyroid Sister    • Diabetes Maternal Aunt    • Other Maternal Grandfather         CHF   • Diabetes Paternal Aunt    • Diabetes Paternal Grandmother    • Psychiatric Illness Paternal Grandmother         alzheimers       She  has a past medical history of Angina, Arrhythmia, ASTHMA, Bronchitis, Cardiac arrhythmia, Chickenpox, Influenza, Kidney, horseshoe, [place]'s syndrome, Other acquired deformity of toe, Personal history of venous thrombosis and embolism, Pneumonia, PSVT (paroxysmal supraventricular tachycardia) (HCC), S/P ablation of ventricular arrhythmia, Ramirez-Parkinson-White syndrome, and WPW (Marisela-Parkinson-White syndrome). She also has no past medical history of Backpain or Dialysis.  She  has a past surgical history that includes zzz implantable cardioverter defibrillator (icd); qian by laparoscopy; other cardiac surgery; gyn surgery (2007); other (2003); other; repeat c section (6/18/2014); primary c section; cholecystectomy; other; pelviscopy (N/A, 9/9/2018); and oophorectomy (Left, 9/9/2018).       Objective:   Vitals obtained by patient:  Ambulatory Vitals  Encounter Vitals  Temperature: 37.1 °C (98.8 °F)  Temp src: Tympanic(pt stated)  Blood Pressure: 121/66(pt states,home cuff)  Pulse: 81(pt stated)  Weight: 83.5 kg (184 lb)(pt stated)  Height: 162.6 cm (5' 4\")  BMI (Calculated): 31.58    Physical Exam:  Constitutional: Alert, no distress, well-groomed.  Skin: No rashes in visible areas.  Eye: Round. Conjunctiva clear, lids normal. No icterus.   ENMT: Lips pink without lesions, good dentition, moist mucous membranes. Phonation normal.  Neck: No masses, no thyromegaly. Moves freely without pain.  CV: " Pulse as reported by patient  Abd: Visualized to be mildly distended  Respiratory: Unlabored respiratory effort, no cough or audible wheeze  Psych: Alert and oriented x3, normal affect and mood.       Assessment and Plan:   The following treatment plan was discussed:     1. Cough  Continue current plan of care, monitor for worsening symptoms.    2. Pain of upper abdomen  3. Colitis, acute  Plan to assess stool test, try a second round of antibiotics at this time.  Discussed with patient that we may need to re-image her abdomen, consider follow-up with gastroenterology if this does not improve.  If symptoms do not improve significantly we will also order labs at this time.  - CULTURE STOOL; Future  - Complete O&P; Future  - metroNIDAZOLE (FLAGYL) 500 MG Tab; Take 1 Tab by mouth 3 times a day for 7 days.  Dispense: 21 Tab; Refill: 0  - azithromycin (ZITHROMAX) 500 MG tablet; Take 2 Tabs by mouth every day for 6 days.  Dispense: 12 Tab; Refill: 0        Follow-up: Return in about 1 month (around 5/20/2020), or if symptoms worsen or fail to improve.

## 2020-04-20 NOTE — TELEPHONE ENCOUNTER
Phone Number Called: [phone] (home) [phone] (work)    Call outcome: Left detailed message for patient. Informed to call back with any additional questions.    Message: LVM to schedule a virtual visit.

## 2020-04-20 NOTE — ASSESSMENT & PLAN NOTE
"Continued cough.  Constant \"take my breath away\" cough. States it is better from a couple weeks ago, but states it hasn't resolved. No drainage, is feeling some discomfort in her ears.  Patient states she feels this is slowly improving.  "

## 2020-08-13 ENCOUNTER — TELEMEDICINE (OUTPATIENT)
Dept: MEDICAL GROUP | Facility: PHYSICIAN GROUP | Age: 40
End: 2020-08-13
Payer: COMMERCIAL

## 2020-08-13 VITALS — BODY MASS INDEX: 32.44 KG/M2 | WEIGHT: 190 LBS | OXYGEN SATURATION: 97 % | HEIGHT: 64 IN | TEMPERATURE: 97.1 F

## 2020-08-13 DIAGNOSIS — J01.00 ACUTE NON-RECURRENT MAXILLARY SINUSITIS: ICD-10-CM

## 2020-08-13 PROCEDURE — 99214 OFFICE O/P EST MOD 30 MIN: CPT | Mod: 95,CR | Performed by: FAMILY MEDICINE

## 2020-08-13 RX ORDER — DOXYCYCLINE HYCLATE 100 MG
100 TABLET ORAL 2 TIMES DAILY
Qty: 14 TAB | Refills: 0 | Status: SHIPPED | OUTPATIENT
Start: 2020-08-13 | End: 2020-08-20

## 2020-08-13 ASSESSMENT — FIBROSIS 4 INDEX: FIB4 SCORE: 0.44

## 2020-08-13 NOTE — PROGRESS NOTES
Telemedicine Visit: Established Patient     This evaluation was conducted via SeeMe, using secure and encrypted videoconferencing technology.  The patient was physical located at Home in [place], [place] and the physician was located in [facility].  The patient was presented by self, at home.  The patient's identity was confirmed and verbal consent for the telemedicine encounter was obtained.      Subjective:   CC:   Chief Complaint   Patient presents with   • Illness     cant hear out of rt ear, sinus pain 1+ week, throat pain        Heidi Gaspar is a 40 y.o. female presenting for evaluation and management of:    #URI  This is a new condition.    Symptom onset: Worsening URI symptoms for 1 week  Current symptoms: Developing sinus pain and right ear pain for 1 week. Has also had sore throat and nasal congestion. Has maxillary sinus pain b/l.   Since onset symptoms are: Unchanged  Modifying factors: Double sickness is enforced today. Denies sick contacts. No known covid contacts, works from home.   Treatments tried:   Associated symptoms: Denies any      ROS   Denies any recent fevers or chills. No nausea or vomiting. No chest pains or shortness of breath.     Allergies   Allergen Reactions   • Inapsine [Droperidol] Anaphylaxis   • Keflex Rash and Swelling     RASH & SWELLING   • Morphine Unspecified     STOPS PEEING     • Penicillins Rash and Swelling     RASH & SWELLING       Current medicines (including changes today)  Current Outpatient Medications   Medication Sig Dispense Refill   • doxycycline (VIBRAMYCIN) 100 MG Tab Take 1 Tab by mouth 2 times a day for 7 days. 14 Tab 0     No current facility-administered medications for this visit.        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     Priority: Medium   • WPW (Marisela-Parkinson-White syndrome)  11/30/2012     Priority: Medium   • Acute maxillary sinusitis 08/13/2020   • Pain of upper abdomen 04/20/2020   • Lipoma of torso 11/13/2019   • Rib pain 11/13/2019   • PVCs (premature ventricular contractions) 10/21/2019   • Pain and swelling of right lower leg 09/30/2019   • Varicose veins of right lower extremity with pain 09/30/2019   • Left posterior fascicular block 05/06/2019   • Right bundle branch block 05/06/2019   • Torsion of left ovary and ovarian pedicle 09/09/2018   • TMJ click 05/01/2018   • Varicose vein of leg 05/01/2018   • Family history of blood clots 06/29/2017   • Fatigue 06/29/2017   • Mild intermittent asthma 06/29/2017   • Cough 04/15/2016   • S/P ablation of accessory bypass tract 11/30/2012       Family History   Problem Relation Age of Onset   • Asthma Mother    • Lung Disease Mother         PE   • Other Mother         clotting disease   • Thyroid Mother    • Lung Disease Son         sees pediatric pulmonologist   • Asthma Son    • Diabetes Father    • Thyroid Sister    • Diabetes Maternal Aunt    • Other Maternal Grandfather         CHF   • Diabetes Paternal Aunt    • Diabetes Paternal Grandmother    • Psychiatric Illness Paternal Grandmother         alzheimers       She  has a past medical history of Angina, Arrhythmia, ASTHMA, Bronchitis, Cardiac arrhythmia, Chickenpox, Influenza, Kidney, horseshoe, Cruz's syndrome, Other acquired deformity of toe, Personal history of venous thrombosis and embolism, Pneumonia, PSVT (paroxysmal supraventricular tachycardia) (HCC), S/P ablation of ventricular arrhythmia, Ramirez-Parkinson-White syndrome, and WPW (Marisela-Parkinson-White syndrome). She also has no past medical history of Backpain or Dialysis.  She  has a past surgical history that includes zzz implantable cardioverter defibrillator (icd); qian by laparoscopy; other cardiac surgery; gyn surgery (2007); other (2003); other; repeat c section (6/18/2014); primary c section; cholecystectomy;  "other; pelviscopy (N/A, 9/9/2018); and oophorectomy (Left, 9/9/2018).       Objective:   Temp 36.2 °C (97.1 °F) (Tympanic) Comment: pt reported  Ht 1.626 m (5' 4\") Comment: pt reported  Wt 86.2 kg (190 lb) Comment: pt reported  SpO2 97% Comment: pt reported  BMI 32.61 kg/m²     Physical Exam:  Constitutional: Alert, no distress, well-groomed.  Skin: No rashes in visible areas.  Eye: Round. Conjunctiva clear, lids normal. No icterus.   ENMT: Lips pink without lesions, good dentition, moist mucous membranes. Phonation normal.  Neck: No masses, no thyromegaly. Moves freely without pain.  CV: Pulse as reported by patient  Respiratory: Unlabored respiratory effort, no cough or audible wheeze  Psych: Alert and oriented x3, normal affect and mood.       Assessment and Plan:   The following treatment plan was discussed:     1. Acute non-recurrent maxillary sinusitis  This is a new problem.  The patient presents with symptoms consistent with acute sinusitis. She endorses double sickness and worsening symptoms. Will Rx doxycycline for 7 days. Urgent care precautions given should her symptoms not improve within 48hrs of abx use as we would need to rule out covid-19 infection at that point. Patient verbalized understanding.   - doxycycline (VIBRAMYCIN) 100 MG Tab; Take 1 Tab by mouth 2 times a day for 7 days.  Dispense: 14 Tab; Refill: 0        Follow-up: Return if symptoms worsen or fail to improve.         "

## 2021-04-18 ENCOUNTER — HOSPITAL ENCOUNTER (EMERGENCY)
Facility: MEDICAL CENTER | Age: 41
End: 2021-04-18
Attending: EMERGENCY MEDICINE
Payer: COMMERCIAL

## 2021-04-18 ENCOUNTER — APPOINTMENT (OUTPATIENT)
Dept: RADIOLOGY | Facility: MEDICAL CENTER | Age: 41
End: 2021-04-18
Attending: EMERGENCY MEDICINE
Payer: COMMERCIAL

## 2021-04-18 ENCOUNTER — OFFICE VISIT (OUTPATIENT)
Dept: URGENT CARE | Facility: PHYSICIAN GROUP | Age: 41
End: 2021-04-18
Payer: COMMERCIAL

## 2021-04-18 VITALS
OXYGEN SATURATION: 99 % | SYSTOLIC BLOOD PRESSURE: 138 MMHG | HEART RATE: 80 BPM | RESPIRATION RATE: 16 BRPM | WEIGHT: 190 LBS | BODY MASS INDEX: 32.44 KG/M2 | DIASTOLIC BLOOD PRESSURE: 90 MMHG | TEMPERATURE: 97.5 F | HEIGHT: 64 IN

## 2021-04-18 VITALS
HEIGHT: 64 IN | WEIGHT: 189.6 LBS | DIASTOLIC BLOOD PRESSURE: 65 MMHG | RESPIRATION RATE: 20 BRPM | SYSTOLIC BLOOD PRESSURE: 117 MMHG | BODY MASS INDEX: 32.37 KG/M2 | OXYGEN SATURATION: 93 % | TEMPERATURE: 97.6 F | HEART RATE: 80 BPM

## 2021-04-18 DIAGNOSIS — R58 ECCHYMOSIS: ICD-10-CM

## 2021-04-18 DIAGNOSIS — Z86.2 HISTORY OF FACTOR V LEIDEN MUTATION: ICD-10-CM

## 2021-04-18 DIAGNOSIS — S80.11XA CONTUSION OF RIGHT LOWER LEG, INITIAL ENCOUNTER: ICD-10-CM

## 2021-04-18 DIAGNOSIS — M79.604 BILATERAL LEG PAIN: ICD-10-CM

## 2021-04-18 DIAGNOSIS — S80.12XA CONTUSION OF LEFT LEG, INITIAL ENCOUNTER: ICD-10-CM

## 2021-04-18 DIAGNOSIS — M79.605 BILATERAL LEG PAIN: ICD-10-CM

## 2021-04-18 LAB
ANION GAP SERPL CALC-SCNC: 13 MMOL/L (ref 7–16)
APTT PPP: 27.8 SEC (ref 24.7–36)
BASOPHILS # BLD AUTO: 0.6 % (ref 0–1.8)
BASOPHILS # BLD: 0.06 K/UL (ref 0–0.12)
BUN SERPL-MCNC: 15 MG/DL (ref 8–22)
CALCIUM SERPL-MCNC: 9.7 MG/DL (ref 8.4–10.2)
CHLORIDE SERPL-SCNC: 105 MMOL/L (ref 96–112)
CO2 SERPL-SCNC: 20 MMOL/L (ref 20–33)
CREAT SERPL-MCNC: 0.48 MG/DL (ref 0.5–1.4)
EOSINOPHIL # BLD AUTO: 0.19 K/UL (ref 0–0.51)
EOSINOPHIL NFR BLD: 1.9 % (ref 0–6.9)
ERYTHROCYTE [DISTWIDTH] IN BLOOD BY AUTOMATED COUNT: 37.4 FL (ref 35.9–50)
GLUCOSE SERPL-MCNC: 94 MG/DL (ref 65–99)
HCT VFR BLD AUTO: 44.2 % (ref 37–47)
HGB BLD-MCNC: 15.8 G/DL (ref 12–16)
IMM GRANULOCYTES # BLD AUTO: 0.04 K/UL (ref 0–0.11)
IMM GRANULOCYTES NFR BLD AUTO: 0.4 % (ref 0–0.9)
INR PPP: 0.99 (ref 0.87–1.13)
LYMPHOCYTES # BLD AUTO: 3.35 K/UL (ref 1–4.8)
LYMPHOCYTES NFR BLD: 33 % (ref 22–41)
MCH RBC QN AUTO: 29.2 PG (ref 27–33)
MCHC RBC AUTO-ENTMCNC: 35.7 G/DL (ref 33.6–35)
MCV RBC AUTO: 81.5 FL (ref 81.4–97.8)
MONOCYTES # BLD AUTO: 0.93 K/UL (ref 0–0.85)
MONOCYTES NFR BLD AUTO: 9.2 % (ref 0–13.4)
NEUTROPHILS # BLD AUTO: 5.57 K/UL (ref 2–7.15)
NEUTROPHILS NFR BLD: 54.9 % (ref 44–72)
NRBC # BLD AUTO: 0 K/UL
NRBC BLD-RTO: 0 /100 WBC
PLATELET # BLD AUTO: 323 K/UL (ref 164–446)
PMV BLD AUTO: 10.1 FL (ref 9–12.9)
POTASSIUM SERPL-SCNC: 4.2 MMOL/L (ref 3.6–5.5)
PROTHROMBIN TIME: 12.8 SEC (ref 12–14.6)
RBC # BLD AUTO: 5.42 M/UL (ref 4.2–5.4)
SODIUM SERPL-SCNC: 138 MMOL/L (ref 135–145)
WBC # BLD AUTO: 10.1 K/UL (ref 4.8–10.8)

## 2021-04-18 PROCEDURE — 85730 THROMBOPLASTIN TIME PARTIAL: CPT

## 2021-04-18 PROCEDURE — 93970 EXTREMITY STUDY: CPT

## 2021-04-18 PROCEDURE — 99213 OFFICE O/P EST LOW 20 MIN: CPT | Performed by: NURSE PRACTITIONER

## 2021-04-18 PROCEDURE — 85025 COMPLETE CBC W/AUTO DIFF WBC: CPT

## 2021-04-18 PROCEDURE — 80048 BASIC METABOLIC PNL TOTAL CA: CPT

## 2021-04-18 PROCEDURE — 85610 PROTHROMBIN TIME: CPT

## 2021-04-18 PROCEDURE — 99283 EMERGENCY DEPT VISIT LOW MDM: CPT

## 2021-04-18 PROCEDURE — 36415 COLL VENOUS BLD VENIPUNCTURE: CPT

## 2021-04-18 ASSESSMENT — FIBROSIS 4 INDEX
FIB4 SCORE: 0.44
FIB4 SCORE: 0.44

## 2021-04-18 NOTE — PROGRESS NOTES
Chief Complaint   Patient presents with   • Leg Pain     R leg pain, bruises behind both [id number]        HISTORY OF PRESENT ILLNESS: Patient is a pleasant 40 y.o. female with a history of factor V and varicose veins who presents to urgent care today with complaints of bilateral low leg pain and bruising. She first noticed symptoms 2 days ago with pain and atraumatic ecchymosis to right calf. Symptoms in his right leg have progressed into today. Furthermore, she noticed similar symptoms to her left calf today with pain and ecchymosis. She denies any injury or trauma. She denies any chest pain or shortness of breath.    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   • WPW (Marisela-Parkinson-White syndrome) 11/30/2012   • Acute maxillary sinusitis 08/13/2020   • Pain of upper abdomen 04/20/2020   • Lipoma of torso 11/13/2019   • Rib pain 11/13/2019   • PVCs (premature ventricular contractions) 10/21/2019   • Pain and swelling of right lower leg 09/30/2019   • Varicose veins of right lower extremity with pain 09/30/2019   • Left posterior fascicular block 05/06/2019   • Right bundle branch block 05/06/2019   • Torsion of left ovary and ovarian pedicle 09/09/2018   • TMJ click 05/01/2018   • Varicose vein of leg 05/01/2018   • Family history of blood clots 06/29/2017   • Fatigue 06/29/2017   • Mild intermittent asthma 06/29/2017   • Cough 04/15/2016   • S/P ablation of accessory bypass tract 11/30/2012       Allergies:Inapsine [droperidol], Keflex, Morphine, and Penicillins    No current Epic-ordered outpatient medications on file.     No current [facility]-ordered facility-administered medications on file.       Past Medical History:   Diagnosis Date   • Angina    • Arrhythmia    • ASTHMA    • Bronchitis    • Cardiac arrhythmia    • Chickenpox    • Influenza    • Kidney, horseshoe    • Cruz's  syndrome    • Other acquired deformity of toe    • Personal history of venous thrombosis and embolism     states when she was little she had problems clotting   • Pneumonia    • PSVT (paroxysmal supraventricular tachycardia) ([facility])    • S/P ablation of ventricular arrhythmia     x6 (most recent was )   • Ramirez-Parkinson-White syndrome    • WPW (Marisela-Parkinson-White syndrome)        Social History     Tobacco Use   • Smoking status: Never Smoker   • Smokeless tobacco: Never Used   Substance Use Topics   • Alcohol use: No     Alcohol/week: 0.0 oz   • Drug use: No       Family Status   Relation Name Status   • Mo  Alive        obese   • Son  Alive   • Fa  Alive   • Sis  Alive   • Sis 6 months    • Son  Alive   • MAunt x2 Alive   • MGFa     • PAunt  (Not Specified)   • PGMo  (Not Specified)     Family History   Problem Relation Age of Onset   • Asthma Mother    • Lung Disease Mother         PE   • Other Mother         clotting disease   • Thyroid Mother    • Lung Disease Son         sees pediatric pulmonologist   • Asthma Son    • Diabetes Father    • Thyroid Sister    • Diabetes Maternal Aunt    • Other Maternal Grandfather         CHF   • Diabetes Paternal Aunt    • Diabetes Paternal Grandmother    • Psychiatric Illness Paternal Grandmother         alzheimers       ROS:  Review of Systems   Constitutional: Negative for fever, chills, weight loss, malaise, and fatigue.   HENT: Negative for ear pain, nosebleeds, congestion, sore throat and neck pain.    Eyes: Negative for vision changes.   Neuro: Negative for headache, sensory changes, weakness, seizure, LOC.   Cardiovascular: Negative for chest pain, palpitations, orthopnea and leg swelling.   Respiratory: Negative for cough, sputum production, shortness of breath and wheezing.   Gastrointestinal: Negative for abdominal pain, nausea, vomiting or diarrhea.   Genitourinary: Negative for dysuria, urgency and frequency.  Musculoskeletal: Positive for  "bilateral calf pain. Negative for falls, neck pain, back pain, joint pain, myalgias.   Skin: Positive for bruising. Negative for rash, diaphoresis.     Exam:  /90   Pulse 80   Temp 36.4 °C (97.5 °F) (Temporal)   Resp 16   Ht 1.626 m (5' 4\")   Wt 86.2 kg (190 lb)   SpO2 99%   General: well-nourished, well-developed female in NAD  Head: normocephalic, atraumatic  Eyes: PERRLA, no conjunctival injection, acuity grossly intact, lids normal.  Ears: normal shape and symmetry, no tenderness, no discharge. External canals are without any significant edema or erythema. Tympanic membranes are without any inflammation, no effusion. Gross auditory acuity is intact.  Nose: symmetrical without tenderness, no discharge.  Mouth/Throat: reasonable hygiene, no erythema, exudates or tonsillar enlargement.  Neck: no masses, range of motion within normal limits, no tracheal deviation. No obvious thyroid enlargement.   Lymph: no cervical adenopathy. No supraclavicular adenopathy.   Neuro: alert and oriented. Cranial nerves 1-12 grossly intact. No sensory deficit.   Cardiovascular: regular rate and rhythm. No edema.  Pulmonary: no distress. Chest is symmetrical with respiration, no wheezes, crackles, or rhonchi.   Musculoskeletal: no clubbing, appropriate muscle tone, gait is stable. Right lower extremity: There is approximately 2 inch circular area of ecchymosis to proximal posterior calf, the area is tender. Left lower extremity: There is a smaller area of ecchymosis also noted to posterior and proximal calf, the area is also tender.   Skin: warm, dry, intact, no clubbing, no cyanosis, no rashes.   Psych: appropriate mood, affect, judgement.         Assessment/Plan:  1. Bilateral leg pain  CANCELED: US-EXTREMITY VENOUS LOWER BILAT   2. Ecchymosis  CANCELED: US-EXTREMITY VENOUS LOWER BILAT   3. History of factor V Leiden mutation  CANCELED: US-EXTREMITY VENOUS LOWER BILAT       Patient is a pleasant 40-year-old female who " presents the urgent care today with atraumatic bilateral calf pain and ecchymosis.  She has a history of factor V as well as varicose veins.  At this time I feel she is a candidate for ultrasound diagnostic study.  Unfortunately there are no more appointments available in the urgent care setting for the remainder of the evening.  Therefore, I have encouraged patient to go to the emergency department tonight for further evaluation and care, she is in agreement.  She has yet to decide which ED she will be going to but will go without delay.  She is in no acute distress upon departure.        I spent a total of 20 minutes with record review, exam, communication with the patient, and documentation of this encounter.  Please note that this dictation was created using voice recognition software. I have made every reasonable attempt to correct obvious errors, but I expect that there are errors of grammar and possibly content that I did not discover before finalizing the note.      YENY Heredia.

## 2021-04-19 NOTE — ED PROVIDER NOTES
ED Provider Note    CHIEF COMPLAINT  Chief Complaint   Patient presents with   • Bleeding/Bruising       HPI  Heidi Gaspar is a 40 y.o. female who presents to the emergency department with chief complaint of bruising to her bilateral upper calves.  She states she is had a lot of cardiac ablations but otherwise has no medical history besides a positive factor V Leiden.  She has never had a clot before even regardless of multiple surgeries and C-sections but her mother is positive for factor V Leiden had large PEs which 1 was killed her which is why she got tested.  She states that the last 2 days she noticed bruising to her bilateral upper calves right greater than left with a little bit of discomfort but cannot recall any trauma and then noted a little bit of lower leg swelling.  She was seen in urgent care recommend ultrasound which is why she is here.  Otherwise she denies any other joint swelling any easy bleeding or bruising from her gums or joints or nose.  She denies any chest pain or shortness of breath    REVIEW OF SYSTEMS  Positives as above. Pertinent negatives include chest pain shortness of breath cough congestion heavy vaginal bleeding bleeding from the gums or nose or joint swelling weakness numbness tingling  All other review of systems are negative    PAST MEDICAL HISTORY   has a past medical history of Angina, Arrhythmia, ASTHMA, Bronchitis, Cardiac arrhythmia, Chickenpox, Influenza, Kidney, horseshoe, [place]'s syndrome, Other acquired deformity of toe, Personal history of venous thrombosis and embolism, Pneumonia, PSVT (paroxysmal supraventricular tachycardia) (HCC), S/P ablation of ventricular arrhythmia, Ramirez-Parkinson-White syndrome, and WPW (Marisela-Parkinson-White syndrome).    SOCIAL HISTORY  Social History     Tobacco Use   • Smoking status: Never Smoker   • Smokeless tobacco: Never Used   Substance and Sexual Activity   • Alcohol use: No     Alcohol/week: 0.0 oz   • Drug use: No   •  "Sexual activity: Yes     Partners: Male     Birth control/protection: Surgical       SURGICAL HISTORY   has a past surgical history that includes zzz implantable cardioverter defibrillator (icd); qian by laparoscopy; other cardiac surgery; gyn surgery (2007); other (2003); other; repeat c section (6/18/2014); primary c section; cholecystectomy; other; pelviscopy (N/A, 9/9/2018); and oophorectomy (Left, 9/9/2018).    CURRENT MEDICATIONS  Home Medications    **Home medications have not yet been reviewed for this encounter**         ALLERGIES  Allergies   Allergen Reactions   • Inapsine [Droperidol] Anaphylaxis   • Keflex Rash and Swelling     RASH & SWELLING   • Morphine Unspecified     STOPS PEEING     • Penicillins Rash and Swelling     RASH & SWELLING       PHYSICAL EXAM  VITAL SIGNS: /97   Pulse 86   Temp 36.4 °C (97.6 °F) (Temporal)   Resp 20   Ht 1.626 m (5' 4\")   Wt 86 kg (189 lb 9.5 oz)   LMP 03/18/2021 (Approximate)   SpO2 94%   BMI 32.54 kg/m²    Pulse ox interpretation: I interpret this pulse ox as normal.  Constitutional: Alert in no apparent distress.  HENT: Normocephalic atraumatic, MMM  Eyes: PER, Conjunctiva normal, Non-icteric.   Neck: Normal range of motion, No tenderness, Supple, No stridor.   Cardiovascular: Regular rate and rhythm, no murmurs.   Thorax & Lungs: Normal breath sounds, No respiratory distress, No wheezing, No chest tenderness.   Abdomen: Bowel sounds normal, Soft, No tenderness, No pulsatile masses. No peritoneal signs.  Skin: Warm, Dry, No erythema, No rash.   Back: No bony tenderness, No CVA tenderness.   Extremities/MSK: Intact equal distal pulses, No edema, mild tenderness and ecchymosis noted to the upper part of the calves right below the popliteal fossa of the knee.  No overt swelling of the calves questionable mild swelling of the ankles but is nonpitting bilaterally, No cyanosis, no major deformities noted  Neurologic: Alert and oriented x3, No focal deficits " "noted.       DIFFERENTIAL DIAGNOSIS AND WORK UP PLAN    This is a 40 y.o. female who presents with bruising and history of factor V Leiden although she is never had clots before she is not a smoker she is not on OCP she is otherwise healthy and well-appearing.  Will evaluate her laboratory analysis just to ensure her coags and platelets are within normal limits as well as ultrasound bilateral lower extremities.  I suspect this is just likely from a trauma she cannot quite remember but will still ensure that there is no evidence of DVT    DIAGNOSTIC STUDIES / PROCEDURES    LABS  Pertinent Lab Findings  CBC within normal limits, BMP within normal limits normal coags      RADIOLOGY  US-EXTREMITY VENOUS LOWER BILAT   Final Result      No evidence of bilateral lower extremity deep venous thrombosis.        The radiologist's interpretation of all radiological studies have been reviewed by me.      COURSE & MEDICAL DECISION MAKING  Pertinent Labs & Imaging studies reviewed. (See chart for details)    8:17 PM  Reassessed patient at that time we discussed her ultrasound findings and laboratory analysis.  Again she may have stressed or stretch them somehow or injured them even mildly and did not realize it.  At this time there is no evidence of DVT I feel comfortable with patient being discharged home she feels comfortable with ice packs and return precautions.    /65   Pulse 80   Temp 36.4 °C (97.6 °F) (Temporal)   Resp 20   Ht 1.626 m (5' 4\")   Wt 86 kg (189 lb 9.5 oz)   LMP 03/18/2021 (Approximate)   SpO2 93%   BMI 32.54 kg/m²       I verified that the patient was wearing a mask and I was wearing appropriate PPE every time I entered the room. The patient's mask was on the patient at all times during my encounter except for a brief view of the oropharynx.    The patient will return for new or worsening symptoms and is stable at the time of discharge.    The patient is referred to a primary physician for blood " pressure management, diabetic screening, and for all other preventative health concerns.    DISPOSITION:  Patient will be discharged home in stable condition.    FOLLOW UP:  Federico Fritz, A.P.R.N.  [address]  [place] [id number] [phone]    Schedule an appointment as soon as possible for a visit       [facility], Emergency Dept  [address]  [place] [id number] [phone]    If symptoms worsen      OUTPATIENT MEDICATIONS:  There are no discharge medications for this patient.          FINAL IMPRESSION  1. Contusion of right lower leg, initial encounter     2. Contusion of left leg, initial encounter             Electronically signed by: Aruna Marie M.D., 4/18/2021 6:23 PM    This dictation has been created using voice recognition software and/or scribes. The accuracy of the dictation is limited by the abilities of the software and the expertise of the scribes. I expect there may be some errors of grammar and possibly content. I made every attempt to manually correct the errors within my dictation. However, errors related to voice recognition software and/or scribes may still exist and should be interpreted within the appropriate context.

## 2021-04-19 NOTE — ED TRIAGE NOTES
"Pt is referred to our facility by [facility] with a history of factor V and varicose veins.  She initially presented there  today with complaints of bilateral low leg pain and bruising recurring for the past 2 days.   Chief Complaint   Patient presents with   • Bleeding/Bruising     /97   Pulse 86   Temp 36.4 °C (97.6 °F) (Temporal)   Resp 20   Ht 1.626 m (5' 4\")   Wt 86 kg (189 lb 9.5 oz)   LMP 03/18/2021 (Approximate)   SpO2 94%   BMI 32.54 kg/m²      "

## 2021-04-22 ENCOUNTER — OFFICE VISIT (OUTPATIENT)
Dept: MEDICAL GROUP | Facility: PHYSICIAN GROUP | Age: 41
End: 2021-04-22
Payer: COMMERCIAL

## 2021-04-22 VITALS
HEIGHT: 64 IN | OXYGEN SATURATION: 97 % | HEART RATE: 70 BPM | TEMPERATURE: 97.6 F | SYSTOLIC BLOOD PRESSURE: 100 MMHG | DIASTOLIC BLOOD PRESSURE: 70 MMHG | BODY MASS INDEX: 33.8 KG/M2 | WEIGHT: 198 LBS | RESPIRATION RATE: 16 BRPM

## 2021-04-22 DIAGNOSIS — I83.813 VARICOSE VEINS OF BOTH LOWER EXTREMITIES WITH PAIN: ICD-10-CM

## 2021-04-22 DIAGNOSIS — T14.8XXA BRUISING: ICD-10-CM

## 2021-04-22 DIAGNOSIS — D68.51 HETEROZYGOUS FACTOR V LEIDEN MUTATION (HCC): ICD-10-CM

## 2021-04-22 DIAGNOSIS — M79.661 PAIN AND SWELLING OF RIGHT LOWER LEG: ICD-10-CM

## 2021-04-22 DIAGNOSIS — M79.89 PAIN AND SWELLING OF RIGHT LOWER LEG: ICD-10-CM

## 2021-04-22 DIAGNOSIS — N92.6 MISSED PERIOD: ICD-10-CM

## 2021-04-22 LAB
INT CON NEG: NEGATIVE
INT CON POS: POSITIVE
POC URINE PREGNANCY TEST: NEGATIVE

## 2021-04-22 PROCEDURE — 99214 OFFICE O/P EST MOD 30 MIN: CPT | Performed by: NURSE PRACTITIONER

## 2021-04-22 PROCEDURE — 81025 URINE PREGNANCY TEST: CPT | Performed by: NURSE PRACTITIONER

## 2021-04-22 ASSESSMENT — FIBROSIS 4 INDEX: FIB4 SCORE: 0.35

## 2021-04-22 ASSESSMENT — PATIENT HEALTH QUESTIONNAIRE - PHQ9: CLINICAL INTERPRETATION OF PHQ2 SCORE: 0

## 2021-04-22 NOTE — ASSESSMENT & PLAN NOTE
Chronic in nature.  Patient is noticing pain in her lower extremities intermittently, has had some lower extremity swelling and was negative for DVT at the ER.  She has 2 large bruises on the backs of her bilateral lower legs.  To the knee that showed up suddenly after some initial knee pain.  As such she is concerned and would like evaluation of this issue.

## 2021-04-22 NOTE — Clinical Note
Is there any specific labs or other work-up you would want her to have prior to seeing you or Dr. Elder?

## 2021-04-22 NOTE — PROGRESS NOTES
"Chief Complaint   Patient presents with   • Bleeding/Bruising     ER FV, recommeded additional labs        HISTORY OF PRESENT ILLNESS: Patient is a 40 y.o. female established patient who presents today to frankie bruising.    Pain and swelling of right lower leg  This is a new issue.  Pain behind knee initially.  Sunday, 4/4, noted quarter-sized bruise that increased in size. Then this past Sunday, 4/11, morning left knee started hurting then bruise noted Sunday at 2 pm. There were 5-6 bruises inferior to the left knee. Both bruises on right and left leg looked black and both improved and look purple today, but they are still present with no yellow-green coloration.  States she remembered her mom's legs being \"red to brown\".  States they are no longer painful at this time and that generally the work-up in the emergency room was benign.  She states she is concerned related to coagulation disorder as well as change in symptoms.  She mentions that it was discussed at length in the ER that she had had no exercise or injury she is not working outside the home and has not been hit by anything.     Heterozygous factor V Leiden mutation (HCC)  Chronic in nature.  There is a noted on the backs of her bilateral lower legs behind the knee.  Some concern related to coagulation though all coagulation markers were in normal limits.  ER physician recommended further labs, patient mentions that the ER physician had mentioned completing labs to include estrogen components.  Plan at this time is to refer her to vascular medicine as she was unable to follow-up with her previous referral.      Varicose vein of leg  Chronic in nature.  Patient is noticing pain in her lower extremities intermittently, has had some lower extremity swelling and was negative for DVT at the ER.  She has 2 large bruises on the backs of her bilateral lower legs.  To the knee that showed up suddenly after some initial knee pain.  As such she is concerned and would " like evaluation of this issue.      Patient Active Problem List    Diagnosis Date Noted   • Palpitations 2018   • Heterozygous factor V Leiden mutation (HCC) 2019   • Cruz syndrome 2019   • WPW (Marisela-Parkinson-White syndrome) 2012   • Bruising 2021   • Acute maxillary sinusitis 2020   • Pain of upper abdomen 2020   • Lipoma of torso 2019   • Rib pain 2019   • PVCs (premature ventricular contractions) 10/21/2019   • Pain and swelling of right lower leg 2019   • Varicose veins of right lower extremity with pain 2019   • Left posterior fascicular block 2019   • Right bundle branch block 2019   • Torsion of left ovary and ovarian pedicle 2018   • TMJ click 2018   • Varicose vein of leg 2018   • Family history of blood clots 2017   • Fatigue 2017   • Mild intermittent asthma 2017   • Cough 04/15/2016   • S/P ablation of accessory bypass tract 2012       Allergies:Inapsine [droperidol], Inapsine [droperidol], Keflex, Morphine, and Penicillins    No current outpatient medications on file.     No current facility-administered medications for this visit.       Social History     Tobacco Use   • Smoking status: Never Smoker   • Smokeless tobacco: Never Used   Substance Use Topics   • Alcohol use: No     Alcohol/week: 0.0 oz   • Drug use: No       Family Status   Relation Name Status   • Mo  Alive        obese   • Son  Alive   • Fa  Alive   • Sis  Alive   • Sis 6 months    • Son  Alive   • MAunt x2 Alive   • MGFa     • PAunt  (Not Specified)   • PGMo  (Not Specified)     Family History   Problem Relation Age of Onset   • Asthma Mother    • Lung Disease Mother         PE   • Other Mother         clotting disease   • Thyroid Mother    • Lung Disease Son         sees pediatric pulmonologist   • Asthma Son    • Diabetes Father    • Thyroid Sister    • Diabetes Maternal Aunt    • Other Maternal  "Grandfather         CHF   • Diabetes Paternal Aunt    • Diabetes Paternal Grandmother    • Psychiatric Illness Paternal Grandmother         alzheimers       ROS:   Patient denies headache, blurry vision, dizziness, chest pain, shortness of breath, abdominal pain, nausea, vomiting, change in level of consciousness.  All other systems reviewed and are negative except as in HPI.    Exam:  /70 (BP Location: Left arm, Patient Position: Sitting, BP Cuff Size: Adult)   Pulse 70   Temp 36.4 °C (97.6 °F) (Temporal)   Resp 16   Ht 1.626 m (5' 4\")   Wt 89.8 kg (198 lb)   SpO2 97%   Constitutional: Alert, no distress, well-groomed.  Skin: Warm, dry, good turgor, no rashes in visible areas.  3 cm round bruise that is purple to reddish inferior to patient's right knee on her calf, oval irregular bruise approximately 4 to 5 cm in diameter inferior to left knee on patient's calf notable today.  Is a palpable mass under the skin on the right.  Eye: Equal, round and reactive, conjunctiva clear, lids normal.  ENMT: Lips without lesions, good dentition, moist mucous membranes.  Neck: Trachea midline, no masses, no thyromegaly.  Respiratory: Unlabored respiratory effort, no cough.  MSK: Normal gait, moves all extremities.   Neuro: Grossly non-focal.   Psych: Alert and oriented x3, normal affect and mood.      PLAN:    1. Heterozygous factor V Leiden mutation (HCC)  2. Bruising  3. Pain and swelling of right lower leg  Referral to vascular medicine for further work-up related to bruising, negative ER work-up for DVT.  Considering bruises were unprovoked and occurred suddenly after initial knee pain there is concern related to patient's blood clotting disorder.  ER physician recommended further lab testing which would be most appropriately obtained through vascular medicine.  Will contact vascular medicine for recommendations on labs to be completed prior to their appointment  - REFERRAL TO VASCULAR MEDICINE    4. Missed " period  Negative pregnancy test  - POCT Pregnancy  - FSH+LH+DHEA S+PROG_E1+E2    5. Varicose veins of both lower extremities with pain  - REFERRAL TO VASCULAR MEDICINE      Return if symptoms worsen or fail to improve.    Please note that this dictation was created using voice recognition software. I have made every reasonable attempt to correct obvious errors, but I expect that there are errors of grammar and possibly content that I did not discover before finalizing the note.    I have placed the below orders and discussed them with an approved delegating provider. The MA is performing the below orders under the direction of Dr. Daly.

## 2021-04-22 NOTE — ASSESSMENT & PLAN NOTE
"This is a new issue.  Pain behind knee initially.  Sunday, 4/4, noted quarter-sized bruise that increased in size. Then this past Sunday, 4/11, morning left knee started hurting then bruise noted Sunday at 2 pm. There were 5-6 bruises inferior to the left knee. Both bruises on right and left leg looked black and both improved and look purple today, but they are still present with no yellow-green coloration.  States she remembered her mom's legs being \"red to brown\".  States they are no longer painful at this time and that generally the work-up in the emergency room was benign.  She states she is concerned related to coagulation disorder as well as change in symptoms.  She mentions that it was discussed at length in the ER that she had had no exercise or injury she is not working outside the home and has not been hit by anything.   "

## 2021-04-22 NOTE — ASSESSMENT & PLAN NOTE
Chronic in nature.  There is a noted on the backs of her bilateral lower legs behind the knee.  Some concern related to coagulation though all coagulation markers were in normal limits.  ER physician recommended further labs, patient mentions that the ER physician had mentioned completing labs to include estrogen components.  Plan at this time is to refer her to vascular medicine as she was unable to follow-up with her previous referral.

## 2021-04-23 ENCOUNTER — TELEPHONE (OUTPATIENT)
Dept: VASCULAR LAB | Facility: MEDICAL CENTER | Age: 41
End: 2021-04-23

## 2021-04-23 NOTE — TELEPHONE ENCOUNTER
"Per Dr. Bloch - \"Please schedule initial visit with Dr. Elder next 40-minute appointment available. Lets make this 1 sort of a priority\"    Okay to take one of Dr. Elder's 4:20 slots.    LVM for pt to call back and schedule initial vascular medicine visit.   "

## 2021-05-14 ENCOUNTER — HOSPITAL ENCOUNTER (OUTPATIENT)
Dept: LAB | Facility: MEDICAL CENTER | Age: 41
End: 2021-05-14
Attending: NURSE PRACTITIONER
Payer: COMMERCIAL

## 2021-05-14 LAB
DHEA-S SERPL-MCNC: 81.5 UG/DL (ref 60.9–337)
FSH SERPL-ACNC: 24.9 MIU/ML
LH SERPL-ACNC: 17.6 IU/L

## 2021-05-14 PROCEDURE — 83002 ASSAY OF GONADOTROPIN (LH): CPT

## 2021-05-14 PROCEDURE — 82670 ASSAY OF TOTAL ESTRADIOL: CPT

## 2021-05-14 PROCEDURE — 83001 ASSAY OF GONADOTROPIN (FSH): CPT

## 2021-05-14 PROCEDURE — 82679 ASSAY OF ESTRONE: CPT

## 2021-05-14 PROCEDURE — 36415 COLL VENOUS BLD VENIPUNCTURE: CPT

## 2021-05-14 PROCEDURE — 84144 ASSAY OF PROGESTERONE: CPT

## 2021-05-14 PROCEDURE — 82627 DEHYDROEPIANDROSTERONE: CPT

## 2021-05-15 LAB
ESTRADIOL SERPL-MCNC: 46 PG/ML
PROGEST SERPL-MCNC: 0.14 NG/ML

## 2021-05-19 LAB — ESTRONE SERPL-MCNC: 37.6 PG/ML

## 2021-05-21 ENCOUNTER — TELEPHONE (OUTPATIENT)
Dept: MEDICAL GROUP | Facility: PHYSICIAN GROUP | Age: 41
End: 2021-05-21

## 2021-05-21 NOTE — TELEPHONE ENCOUNTER
VOICEMAIL  1. Caller Name: Heidi Gaspar     Call Back Number: [phone]    2. Message: Patient left voicemail asking to be called back, because she was returning a message left on her voicemail.    3. Patient approves office to leave a detailed voicemail/MyChart message: yes

## 2021-05-21 NOTE — TELEPHONE ENCOUNTER
Phone Number Called: [phone]       Call outcome: Spoke to patient regarding message below.    Message: Spoke to Patient and Informed her of her lab results, Patient had no questions or Concerns.

## 2021-05-21 NOTE — TELEPHONE ENCOUNTER
----- Message from TITO BucioRPERLA sent at 5/20/2021  6:06 PM PDT -----  Please call pt and give lab results: There are no concerning finding on the recent hormone test most of the levels are consistent with a midcycle measurement.  Please let me know if you have further questions regarding these results.

## 2021-05-21 NOTE — TELEPHONE ENCOUNTER
Phone Number Called: [phone]    Call outcome: Did not leave a detailed message. Requested patient to call back.    Message: Left voicemail regarding patients Lab results Ask patient to call back and ask to speak to Harjit STEWARD

## 2021-06-13 ENCOUNTER — OFFICE VISIT (OUTPATIENT)
Dept: URGENT CARE | Facility: PHYSICIAN GROUP | Age: 41
End: 2021-06-13
Payer: COMMERCIAL

## 2021-06-13 VITALS
DIASTOLIC BLOOD PRESSURE: 90 MMHG | RESPIRATION RATE: 16 BRPM | OXYGEN SATURATION: 98 % | WEIGHT: 190 LBS | HEIGHT: 64 IN | TEMPERATURE: 74 F | BODY MASS INDEX: 32.44 KG/M2 | HEART RATE: 64 BPM | SYSTOLIC BLOOD PRESSURE: 140 MMHG

## 2021-06-13 DIAGNOSIS — B96.89 ACUTE BACTERIAL SINUSITIS: ICD-10-CM

## 2021-06-13 DIAGNOSIS — J01.90 ACUTE BACTERIAL SINUSITIS: ICD-10-CM

## 2021-06-13 PROCEDURE — 99213 OFFICE O/P EST LOW 20 MIN: CPT | Performed by: NURSE PRACTITIONER

## 2021-06-13 RX ORDER — DOXYCYCLINE HYCLATE 100 MG
100 TABLET ORAL 2 TIMES DAILY
Qty: 14 TABLET | Refills: 0 | Status: SHIPPED | OUTPATIENT
Start: 2021-06-13 | End: 2021-06-20

## 2021-06-13 ASSESSMENT — ENCOUNTER SYMPTOMS
SORE THROAT: 1
SINUS PAIN: 1
COUGH: 0
SHORTNESS OF BREATH: 0
SINUS PRESSURE: 1

## 2021-06-13 ASSESSMENT — FIBROSIS 4 INDEX: FIB4 SCORE: 0.36

## 2021-06-13 NOTE — PROGRESS NOTES
Subjective:      Heidi Gaspar is a 41 y.o. female who presents with Sinus Problem (sinus pressure, gum pain x2 wks)            Sinus Problem  This is a new problem. Episode onset: pt reports new onset of nasal congestion and runny nose 2 weeks ago. 4 days ago it started to developed into ST, right sided sinus pressure and pain. +swollen lymph nodes to right side. No fevers, no chills. The problem has been gradually worsening since onset. Associated symptoms include congestion, sinus pressure and a sore throat. Pertinent negatives include no coughing or shortness of breath. Past treatments include saline sprays (800 mg ibuprofen BID). The treatment provided no relief.       Review of Systems   HENT: Positive for congestion, sinus pressure, sinus pain and sore throat.    Respiratory: Negative for cough and shortness of breath.    All other systems reviewed and are negative.    Past Medical History:   Diagnosis Date   • Angina    • Arrhythmia    • ASTHMA    • Bronchitis    • Cardiac arrhythmia    • Chickenpox    • Influenza    • Kidney, horseshoe    • Cruz's syndrome    • Other acquired deformity of toe    • Personal history of venous thrombosis and embolism     states when she was little she had problems clotting   • Pneumonia    • PSVT (paroxysmal supraventricular tachycardia) ([facility])    • S/P ablation of ventricular arrhythmia     x6 (most recent was 2006)   • Ramirez-Parkinson-White syndrome    • WPW (Marisela-Parkinson-White syndrome)       Past Surgical History:   Procedure Laterality Date   • PELVISCOPY N/A 9/9/2018    Procedure: PELVISCOPY;  Surgeon: Dianne Martinez M.D.;  Location: SURGERY [facility];  Service: Gyn Robotic   • OOPHORECTOMY Left 9/9/2018    Procedure: SALPINGOOPHORECTOMY;  Surgeon: Dianne Martinez M.D.;  Location: SURGERY [facility];  Service: Gyn Robotic   • REPEAT C SECTION  6/18/2014    Performed by Corinne E Capurro, M.D. at [facility] DEL[facility]   • GYN SURGERY  2007    csection   •  OTHER  2003    L BREAST LUMPECTOMY   • BLANCA BY LAPAROSCOPY     • CHOLECYSTECTOMY     • OTHER      gallbladder removed   • OTHER      cardiac ablations x 6   • OTHER CARDIAC SURGERY      6 ablation   • PRIMARY C SECTION     • ZZZ IMPLANTABLE CARDIOVERTER DEFIBRILLATOR (ICD)        Social History     Socioeconomic History   • Marital status:      Spouse name: Not on file   • Number of children: Not on file   • Years of education: Not on file   • Highest education level: Not on file   Occupational History   • Not on file   Tobacco Use   • Smoking status: Never Smoker   • Smokeless tobacco: Never Used   Vaping Use   • Vaping Use: Never used   Substance and Sexual Activity   • Alcohol use: No     Alcohol/week: 0.0 oz   • Drug use: No   • Sexual activity: Yes     Partners: Male     Birth control/protection: Surgical   Other Topics Concern   • Not on file   Social History Narrative   • Not on file     Social Determinants of Health     Financial Resource Strain:    • Difficulty of Paying Living Expenses:    Food Insecurity:    • Worried About Running Out of Food in the Last Year:    • Ran Out of Food in the Last Year:    Transportation Needs:    • Lack of Transportation (Medical):    • Lack of Transportation (Non-Medical):    Physical Activity:    • Days of Exercise per Week:    • Minutes of Exercise per Session:    Stress:    • Feeling of Stress :    Social Connections:    • Frequency of Communication with Friends and Family:    • Frequency of Social Gatherings with Friends and Family:    • Attends [employer] Services:    • Active Member of Clubs or Organizations:    • Attends Club or Organization Meetings:    • Marital Status:    Intimate Partner Violence:    • Fear of Current or Ex-Partner:    • Emotionally Abused:    • Physically Abused:    • Sexually Abused:           Objective:     /90 (BP Location: Left arm, Patient Position: Sitting, BP Cuff Size: Small adult)   Pulse 64   Temp (!) 23.3 °C (74 °F)    "Resp 16   Ht 1.626 m (5' 4\")   Wt 86.2 kg (190 lb)   SpO2 98%   BMI 32.61 kg/m²      Physical Exam  Vitals and nursing note reviewed.   Constitutional:       Appearance: Normal appearance. She is normal weight.   HENT:      Head: Normocephalic and atraumatic.      Nose: Nasal tenderness and congestion present.      Right Sinus: Maxillary sinus tenderness present.      Mouth/Throat:      Mouth: Mucous membranes are moist.      Pharynx: Oropharynx is clear.   Eyes:      Extraocular Movements: Extraocular movements intact.      Pupils: Pupils are equal, round, and reactive to light.   Cardiovascular:      Rate and Rhythm: Normal rate and regular rhythm.   Pulmonary:      Effort: Pulmonary effort is normal.   Musculoskeletal:         General: Normal range of motion.      Cervical back: Normal range of motion.   Skin:     General: Skin is warm and dry.      Capillary Refill: Capillary refill takes less than 2 seconds.   Neurological:      General: No focal deficit present.      Mental Status: She is alert and oriented to person, place, and time. Mental status is at baseline.   Psychiatric:         Mood and Affect: Mood normal.         Speech: Speech normal.         Thought Content: Thought content normal.         Judgment: Judgment normal.                        Assessment/Plan:        1. Acute bacterial sinusitis  - doxycycline (VIBRAMYCIN) 100 MG Tab; Take 1 tablet by mouth 2 times a day for 7 days.  Dispense: 14 tablet; Refill: 0    Take full course of abx  Tylenol and ibuprofen as needed for pain  Begin taking daily claritin again  Encouraged her to keep using nasal saline rinses and sprays  Supportive care, differential diagnoses, and indications for immediate follow-up discussed with patient.    Pathogenesis of diagnosis discussed including typical length and natural progression.      Instructed to return to  or nearest emergency department if symptoms fail to improve, for any change in condition, further " concerns, or new concerning symptoms.  Patient states understanding of the plan of care and discharge instructions.

## 2021-07-17 ENCOUNTER — OFFICE VISIT (OUTPATIENT)
Dept: URGENT CARE | Facility: PHYSICIAN GROUP | Age: 41
End: 2021-07-17
Payer: COMMERCIAL

## 2021-07-17 VITALS
OXYGEN SATURATION: 96 % | SYSTOLIC BLOOD PRESSURE: 110 MMHG | HEIGHT: 64 IN | RESPIRATION RATE: 18 BRPM | WEIGHT: 195 LBS | BODY MASS INDEX: 33.29 KG/M2 | DIASTOLIC BLOOD PRESSURE: 70 MMHG | TEMPERATURE: 97.6 F | HEART RATE: 76 BPM

## 2021-07-17 DIAGNOSIS — R21 RASH AND NONSPECIFIC SKIN ERUPTION: ICD-10-CM

## 2021-07-17 PROCEDURE — 99214 OFFICE O/P EST MOD 30 MIN: CPT | Performed by: NURSE PRACTITIONER

## 2021-07-17 RX ORDER — TRIAMCINOLONE ACETONIDE 1 MG/G
1 CREAM TOPICAL 2 TIMES DAILY
Qty: 80 G | Refills: 0 | Status: SHIPPED | OUTPATIENT
Start: 2021-07-17 | End: 2021-07-24

## 2021-07-17 RX ORDER — METHYLPREDNISOLONE 4 MG/1
TABLET ORAL
Qty: 21 TABLET | Refills: 0 | Status: SHIPPED | OUTPATIENT
Start: 2021-07-17 | End: 2022-01-20

## 2021-07-17 ASSESSMENT — ENCOUNTER SYMPTOMS
VOMITING: 0
FEVER: 0
CHILLS: 0
HEADACHES: 0
NAUSEA: 0
MYALGIAS: 0

## 2021-07-17 ASSESSMENT — FIBROSIS 4 INDEX: FIB4 SCORE: 0.36

## 2021-07-17 NOTE — PROGRESS NOTES
Subjective:     Heidi Gaspar is a 41 y.o. female who presents for Rash (on the back of neck and ears, itchy, burn, feels hot, x4 days.)      Rash  This is a new problem. The current episode started in the past 7 days. Pertinent negatives include no fever or vomiting.     Pt presents for evaluation of a new problem.  Heidi is a very pleasant 41-year-old female presents to urgent care today with complaints of a rash on the her posterior neck that has now traveled to her bilateral ears.  Her rash is progressively worsening.  She notes swelling, bumps, erythema and itching.  Her ears are hot to touch.  She has used over-the-counter hydrocortisone cream as well as Benadryl.  She notes no improvement with these treatments.  Recently, she has bought new pillows, new sheets,  new shampoo and conditioner.  Her rash did develop the day following a trip to the beach at [facility].  She originally believed that this rash was due to heat but as it is progressively worsening and is concerned that it is a contact dermatitis.  No one in her household shares a similar rash.  Review of Systems   Constitutional: Negative for chills, fever and malaise/fatigue.   Gastrointestinal: Negative for nausea and vomiting.   Musculoskeletal: Negative for myalgias.   Skin: Positive for itching and rash.   Neurological: Negative for headaches.       PMH:   Past Medical History:   Diagnosis Date   • Angina    • Arrhythmia    • ASTHMA    • Bronchitis    • Cardiac arrhythmia    • Chickenpox    • Influenza    • Kidney, horseshoe    • Cruz's syndrome    • Other acquired deformity of toe    • Personal history of venous thrombosis and embolism     states when she was little she had problems clotting   • Pneumonia    • PSVT (paroxysmal supraventricular tachycardia) ([facility])    • S/P ablation of ventricular arrhythmia     x6 (most recent was 2006)   • Ramirez-Parkinson-White syndrome    • WPW (Marisela-Parkinson-White syndrome)      ALLERGIES:   Allergies    Allergen Reactions   • Inapsine [Droperidol] Anaphylaxis   • Inapsine [Droperidol] Anaphylaxis   • Keflex Rash and Swelling     RASH & SWELLING   • Morphine Unspecified     STOPS PEEING     • Penicillins Rash and Swelling     RASH & SWELLING     SURGHX:   Past Surgical History:   Procedure Laterality Date   • PELVISCOPY N/A 9/9/2018    Procedure: PELVISCOPY;  Surgeon: Dianne Martinez M.D.;  Location: SURGERY [facility];  Service: Gyn Robotic   • OOPHORECTOMY Left 9/9/2018    Procedure: SALPINGOOPHORECTOMY;  Surgeon: Dianne Martinez M.D.;  Location: SURGERY [facility];  Service: Gyn Robotic   • REPEAT C SECTION  6/18/2014    Performed by Corinne E Capurro, M.D. at LABOR AND DELIVERY   • GYN SURGERY  2007    csection   • OTHER  2003    L BREAST LUMPECTOMY   • BLANCA BY LAPAROSCOPY     • CHOLECYSTECTOMY     • OTHER      gallbladder removed   • OTHER      cardiac ablations x 6   • OTHER CARDIAC SURGERY      6 ablation   • PRIMARY C SECTION     • ZZZ IMPLANTABLE CARDIOVERTER DEFIBRILLATOR (ICD)       SOCHX:   Social History     Socioeconomic History   • Marital status:      Spouse name: Not on file   • Number of children: Not on file   • Years of education: Not on file   • Highest education level: Not on file   Occupational History   • Not on file   Tobacco Use   • Smoking status: Never Smoker   • Smokeless tobacco: Never Used   Vaping Use   • Vaping Use: Never used   Substance and Sexual Activity   • Alcohol use: No     Alcohol/week: 0.0 oz   • Drug use: No   • Sexual activity: Yes     Partners: Male     Birth control/protection: Surgical   Other Topics Concern   • Not on file   Social History Narrative   • Not on file     Social Determinants of Health     Financial Resource Strain:    • Difficulty of Paying Living Expenses:    Food Insecurity:    • Worried About Running Out of Food in the Last Year:    • Ran Out of Food in the Last Year:    Transportation Needs:    • Lack of Transportation (Medical):   "  • Lack of Transportation (Non-Medical):    Physical Activity:    • Days of Exercise per Week:    • Minutes of Exercise per Session:    Stress:    • Feeling of Stress :    Social Connections:    • Frequency of Communication with Friends and Family:    • Frequency of Social Gatherings with Friends and Family:    • Attends Congregational Services:    • Active Member of Clubs or Organizations:    • Attends Club or Organization Meetings:    • Marital Status:    Intimate Partner Violence:    • Fear of Current or Ex-Partner:    • Emotionally Abused:    • Physically Abused:    • Sexually Abused:      FH:   Family History   Problem Relation Age of Onset   • Asthma Mother    • Lung Disease Mother         PE   • Other Mother         clotting disease   • Thyroid Mother    • Lung Disease Son         sees pediatric pulmonologist   • Asthma Son    • Diabetes Father    • Thyroid Sister    • Diabetes Maternal Aunt    • Other Maternal Grandfather         CHF   • Diabetes Paternal Aunt    • Diabetes Paternal Grandmother    • Psychiatric Illness Paternal Grandmother         alzheimers         Objective:   /70 (BP Location: Left arm, Patient Position: Sitting, BP Cuff Size: Adult)   Pulse 76   Temp 36.4 °C (97.6 °F) (Temporal)   Resp 18   Ht 1.626 m (5' 4\")   Wt 88.5 kg (195 lb)   SpO2 96%   BMI 33.47 kg/m²     Physical Exam  Vitals and nursing note reviewed.   Constitutional:       General: She is not in acute distress.     Appearance: Normal appearance. She is not ill-appearing.   HENT:      Head: Normocephalic and atraumatic.      Right Ear: External ear normal.      Left Ear: External ear normal.      Nose: No congestion or rhinorrhea.      Mouth/Throat:      Mouth: Mucous membranes are moist.   Eyes:      Extraocular Movements: Extraocular movements intact.      Pupils: Pupils are equal, round, and reactive to light.   Cardiovascular:      Rate and Rhythm: Normal rate and regular rhythm.      Pulses: Normal pulses.      " Heart sounds: Normal heart sounds.   Pulmonary:      Effort: Pulmonary effort is normal.      Breath sounds: Normal breath sounds.   Abdominal:      General: Abdomen is flat. Bowel sounds are normal.      Palpations: Abdomen is soft.      Tenderness: There is no abdominal tenderness. There is no right CVA tenderness or left CVA tenderness.   Musculoskeletal:         General: Normal range of motion.      Cervical back: Normal range of motion and neck supple.   Skin:     General: Skin is warm and dry.      Capillary Refill: Capillary refill takes less than 2 seconds.      Findings: Erythema and rash present. No abscess or lesion. Rash is macular, papular, scaling and urticarial. Rash is not crusting or vesicular.      Comments: Positive for erythemic maculopapular rash to posterior neck and bilateral external ears.  Skin is warm and flaky.  Her skin is hot to touch.   Neurological:      General: No focal deficit present.      Mental Status: She is alert and oriented to person, place, and time. Mental status is at baseline.   Psychiatric:         Mood and Affect: Mood normal.         Behavior: Behavior normal.         Thought Content: Thought content normal.         Judgment: Judgment normal.         Assessment/Plan:   Assessment    1. Rash and nonspecific skin eruption  triamcinolone acetonide (KENALOG) 0.1 % Cream    methylPREDNISolone (MEDROL DOSEPAK) 4 MG Tablet Therapy Pack     Discussed differential diagnoses of her rash.  It is likely caused from her new pillows due to the location of her rash.  Kenalog cream as well as Medrol Dosepak prescribed for relief of rash.  Follow-up in clinic for worsening or persistent symptoms.  AVS handout given and reviewed with patient. Pt educated on red flags and when to seek treatment back in ER or UC.

## 2021-09-13 ENCOUNTER — APPOINTMENT (RX ONLY)
Dept: URBAN - METROPOLITAN AREA CLINIC 22 | Facility: CLINIC | Age: 41
Setting detail: DERMATOLOGY
End: 2021-09-13

## 2021-09-13 DIAGNOSIS — L57.0 ACTINIC KERATOSIS: ICD-10-CM

## 2021-09-13 DIAGNOSIS — D18.0 HEMANGIOMA: ICD-10-CM

## 2021-09-13 DIAGNOSIS — L73.8 OTHER SPECIFIED FOLLICULAR DISORDERS: ICD-10-CM

## 2021-09-13 DIAGNOSIS — D22 MELANOCYTIC NEVI: ICD-10-CM

## 2021-09-13 DIAGNOSIS — L74.51 PRIMARY FOCAL HYPERHIDROSIS: ICD-10-CM

## 2021-09-13 PROBLEM — D18.01 HEMANGIOMA OF SKIN AND SUBCUTANEOUS TISSUE: Status: ACTIVE | Noted: 2021-09-13

## 2021-09-13 PROBLEM — L74.512 PRIMARY FOCAL HYPERHIDROSIS, PALMS: Status: ACTIVE | Noted: 2021-09-13

## 2021-09-13 PROBLEM — D22.4 MELANOCYTIC NEVI OF SCALP AND NECK: Status: ACTIVE | Noted: 2021-09-13

## 2021-09-13 PROCEDURE — 99203 OFFICE O/P NEW LOW 30 MIN: CPT | Mod: 25

## 2021-09-13 PROCEDURE — ? ADDITIONAL NOTES

## 2021-09-13 PROCEDURE — ? LIQUID NITROGEN

## 2021-09-13 PROCEDURE — 17000 DESTRUCT PREMALG LESION: CPT

## 2021-09-13 PROCEDURE — ? COUNSELING

## 2021-09-13 ASSESSMENT — LOCATION DETAILED DESCRIPTION DERM
LOCATION DETAILED: NASAL DORSUM
LOCATION DETAILED: LEFT SUPERIOR CENTRAL BUCCAL CHEEK
LOCATION DETAILED: LEFT INFERIOR MEDIAL BUCCAL CHEEK
LOCATION DETAILED: RIGHT ULNAR PALM
LOCATION DETAILED: RIGHT LATERAL TRAPEZIAL NECK
LOCATION DETAILED: LEFT RADIAL PALM

## 2021-09-13 ASSESSMENT — LOCATION SIMPLE DESCRIPTION DERM
LOCATION SIMPLE: POSTERIOR NECK
LOCATION SIMPLE: LEFT HAND
LOCATION SIMPLE: NOSE
LOCATION SIMPLE: RIGHT HAND
LOCATION SIMPLE: LEFT CHEEK

## 2021-09-13 ASSESSMENT — LOCATION ZONE DERM
LOCATION ZONE: NOSE
LOCATION ZONE: HAND
LOCATION ZONE: NECK
LOCATION ZONE: FACE

## 2021-09-13 NOTE — PROCEDURE: COUNSELING
Detail Level: Detailed
Detail Level: Simple
Medical Necessity Clause: Botulinum toxin hyperhidrosis therapy is medically necessary because
Detail Level: Zone
Medical Necessity Information: LCD Guidelines vary from payer to payer. Please check with your payer's policy to determine medical necessity.

## 2021-09-13 NOTE — PROCEDURE: LIQUID NITROGEN
Post-Care Instructions: I reviewed with the patient in detail post-care instructions. Patient is to wear sunprotection, and avoid picking at any of the treated lesions. Pt may apply Vaseline to crusted or scabbing areas.
Duration Of Freeze Thaw-Cycle (Seconds): 3
Render Note In Bullet Format When Appropriate: No
Show Applicator Variable?: Yes
Consent: The patient's consent was obtained including but not limited to risks of crusting, scabbing, blistering, scarring, darker or lighter pigmentary change, recurrence, incomplete removal and infection.
Number Of Freeze-Thaw Cycles: 2 freeze-thaw cycles
Detail Level: Detailed

## 2021-09-13 NOTE — PROCEDURE: ADDITIONAL NOTES
Additional Notes: Has had since adolescence.  Life altering and distressing for patient.  No antiperspirants have helped in the past.   \\nPaperwork filled out for iontophoresis through [facility].  \\nPatient is aware that this process can take several weeks. She verbalized understanding and wishes to move forward with this process.
Detail Level: Detailed
Render Risk Assessment In Note?: no

## 2022-01-20 ENCOUNTER — TELEMEDICINE (OUTPATIENT)
Dept: MEDICAL GROUP | Facility: PHYSICIAN GROUP | Age: 42
End: 2022-01-20
Payer: COMMERCIAL

## 2022-01-20 VITALS — HEIGHT: 64 IN | WEIGHT: 195 LBS | OXYGEN SATURATION: 94 % | BODY MASS INDEX: 33.29 KG/M2 | HEART RATE: 122 BPM

## 2022-01-20 DIAGNOSIS — U07.1 COVID-19 VIRUS INFECTION: ICD-10-CM

## 2022-01-20 PROCEDURE — 99213 OFFICE O/P EST LOW 20 MIN: CPT | Mod: 95,CR | Performed by: FAMILY MEDICINE

## 2022-01-20 ASSESSMENT — FIBROSIS 4 INDEX: FIB4 SCORE: 0.36

## 2022-01-20 NOTE — PROGRESS NOTES
Virtual Visit: Established Patient   This visit was conducted via Zoom using secure and encrypted videoconferencing technology.   The patient was in a private location in the state of [place].    The patient's identity was confirmed and verbal consent was obtained for this virtual visit.    Subjective:   CC:   Chief Complaint   Patient presents with   • Congestion     Chest concerned / discuss tested positive for covid        Heidi Gaspar is a 41 y.o. female presenting for evaluation and management of:    This is a 41-year-old female who is a patient of [facility]UARE[facility] Br[facility].  PCP is not currently available.    Patient started having cough which is nonproductive since yesterday, chills, body aches, nasal congestion, feeling that the throat is swollen but no sore throat.  She did a COVID test today and is positive.  Patient with history of asthma and she has been using her albuterol inhaler and nebulizer.  Pulse ox has been running at 94% on room air.  Patient also with history of Marisela-Parkinson-White syndrome status post ablation.  Denies any chest pain.  She got 2 doses of Pfizer COVID-19 vaccine with the second dose in April 2021.    ROS   As per HPI, the rest are negative.    Current medicines (including changes today)  Current Outpatient Medications   Medication Sig Dispense Refill   • Nirmatrelvir & Ritonavir (PAXLOVID) 20 x 150 MG & 10 x 100MG Tablet Therapy Pack Take by mouth 2 tabs Nirmatrelvir and 1 tab Ritonovir daily for 5  Days. 1 Each 0   • methylPREDNISolone (MEDROL DOSEPAK) 4 MG Tablet Therapy Pack Follow schedule on package instructions. 21 tablet 0   • albuterol (PROVENTIL) 2.5 mg/0.5 mL Nebu Soln Take  by nebulization one time.       No current facility-administered medications for this visit.       Patient Active Problem List    Diagnosis Date Noted   • Bruising 04/22/2021   • Acute maxillary sinusitis 08/13/2020   • Pain of upper abdomen 04/20/2020   • Lipoma of torso 11/13/2019   •  "Rib pain 11/13/2019   • PVCs (premature ventricular contractions) 10/21/2019   • Pain and swelling of right lower leg 09/30/2019   • Varicose veins of right lower extremity with pain 09/30/2019   • Heterozygous factor V Leiden mutation (HCC) 09/23/2019   • Cruz syndrome 09/23/2019   • Left posterior fascicular block 05/06/2019   • Right bundle branch block 05/06/2019   • Palpitations 11/12/2018   • Torsion of left ovary and ovarian pedicle 09/09/2018   • TMJ click 05/01/2018   • Varicose vein of leg 05/01/2018   • Family history of blood clots 06/29/2017   • Fatigue 06/29/2017   • Mild intermittent asthma 06/29/2017   • Cough 04/15/2016   • S/P ablation of accessory bypass tract 11/30/2012   • WPW (Marisela-Parkinson-White syndrome) 11/30/2012        Objective:   Pulse (!) 122   Ht 1.626 m (5' 4\")   Wt 88.5 kg (195 lb)   SpO2 94%   BMI 33.47 kg/m²     Physical Exam:  Constitutional: Alert, no distress, well-groomed.  Skin: No rashes in visible areas.  Eye: Round. Conjunctiva clear, lids normal. No icterus.   ENMT: Lips pink without lesions, good dentition, moist mucous membranes. Phonation normal.  Neck: No masses, no thyromegaly. Moves freely without pain.  Respiratory: Unlabored respiratory effort, no cough or audible wheeze  Psych: Alert and oriented x3, normal affect and mood.     Assessment and Plan:   The following treatment plan was discussed:     1. COVID-19 virus infection    Other orders  - Nirmatrelvir & Ritonavir (PAXLOVID) 20 x 150 MG & 10 x 100MG Tablet Therapy Pack; Take by mouth 2 tabs Nirmatrelvir and 1 tab Ritonovir daily for 5  Days.  Dispense: 1 Each; Refill: 0  Symptoms are mild.  No hypoxia.  She has risk factors for severe disease because of her asthma and heart disease.  We discussed her treatment options.  I discussed the oral medication Paxlovid and the IV medication sotrovimab.  Made her aware both medications are EUA medications.  She wants to proceed with Paxlovid.  She will  " the prescription from our office and take it to [facility] ER so she can get the prescription.  She was made aware that sometimes they run out of the medication and may not be available.  She was given the fact sheet for the drug so she can read all information about the medication.  We discussed doing conservative measures at home including increase p.o. fluids, rest, taking Tylenol as needed for fever and pain.  We discussed isolation for total of 5 days from the time of onset of symptoms.  Patient voiced understanding.    Follow-up: As needed.      Please note that this dictation was created using voice recognition software. I have worked with consultants from the vendor as well as technical experts from [facility] to optimize the interface. I have made every reasonable attempt to correct obvious errors, but I expect that there are errors of grammar and possibly content I did not discover before finalizing the note.

## 2022-01-26 ENCOUNTER — OFFICE VISIT (OUTPATIENT)
Dept: URGENT CARE | Facility: PHYSICIAN GROUP | Age: 42
End: 2022-01-26
Payer: COMMERCIAL

## 2022-01-26 VITALS
SYSTOLIC BLOOD PRESSURE: 112 MMHG | OXYGEN SATURATION: 97 % | HEART RATE: 77 BPM | WEIGHT: 195 LBS | RESPIRATION RATE: 20 BRPM | TEMPERATURE: 97.9 F | HEIGHT: 64 IN | BODY MASS INDEX: 33.29 KG/M2 | DIASTOLIC BLOOD PRESSURE: 68 MMHG

## 2022-01-26 DIAGNOSIS — R05.9 COUGH: ICD-10-CM

## 2022-01-26 DIAGNOSIS — U07.1 COVID-19: ICD-10-CM

## 2022-01-26 DIAGNOSIS — Z87.09 HISTORY OF ASTHMA: ICD-10-CM

## 2022-01-26 PROCEDURE — 99214 OFFICE O/P EST MOD 30 MIN: CPT | Mod: CS | Performed by: PHYSICIAN ASSISTANT

## 2022-01-26 RX ORDER — BENZONATATE 100 MG/1
100 CAPSULE ORAL 3 TIMES DAILY PRN
Qty: 60 CAPSULE | Refills: 0 | Status: SHIPPED | OUTPATIENT
Start: 2022-01-26 | End: 2022-02-07

## 2022-01-26 RX ORDER — DEXTROMETHORPHAN HYDROBROMIDE AND PROMETHAZINE HYDROCHLORIDE 15; 6.25 MG/5ML; MG/5ML
5 SYRUP ORAL
Qty: 100 ML | Refills: 0 | Status: SHIPPED | OUTPATIENT
Start: 2022-01-26 | End: 2022-02-07

## 2022-01-26 ASSESSMENT — ENCOUNTER SYMPTOMS
VOMITING: 0
HEADACHES: 1
SPUTUM PRODUCTION: 1
PALPITATIONS: 0
COUGH: 1
FEVER: 0
SHORTNESS OF BREATH: 0
CHILLS: 0
WHEEZING: 1
NAUSEA: 0
MYALGIAS: 1
ABDOMINAL PAIN: 0
SINUS PAIN: 1
DIARRHEA: 0

## 2022-01-26 ASSESSMENT — FIBROSIS 4 INDEX: FIB4 SCORE: 0.36

## 2022-01-26 NOTE — PROGRESS NOTES
Subjective:   Heidi Gaspar is a 41 y.o. female who presents for Cough (ear pain, sinus pressure/headaches x1 week tested positive for covid on the 18th )      HPI  41 y.o. female presents to urgent care with new problem to provider of persistent cough, ear pain, congestion, body ache, and fatigue onset about 1 week ago secondary to recent diagnosis of COVID-19 viral infection.  Patient was prescribed Paxlovid by PCP and finished 5 day treatment course yesterday.  She denies symptoms of chest pain or shortness of breath.  No fevers.  Patient has history of asthma, she is taking nebulizer treatments at home with good relief of wheezing.  Denies other associated aggravating or alleviating factors.     Review of Systems   Constitutional: Positive for malaise/fatigue. Negative for chills and fever.   HENT: Positive for congestion, ear pain and sinus pain.    Respiratory: Positive for cough, sputum production and wheezing. Negative for shortness of breath.    Cardiovascular: Negative for chest pain and palpitations.   Gastrointestinal: Negative for abdominal pain, diarrhea, nausea and vomiting.   Musculoskeletal: Positive for myalgias.   Neurological: Positive for headaches.       Patient Active Problem List   Diagnosis   • S/P ablation of accessory bypass tract   • WPW (Marisela-Parkinson-White syndrome)   • Cough   • Family history of blood clots   • Fatigue   • Mild intermittent asthma   • TMJ click   • Varicose vein of leg   • Torsion of left ovary and ovarian pedicle   • Palpitations   • Left posterior fascicular block   • Right bundle branch block   • Heterozygous factor V Leiden mutation (HCC)   • Scotia syndrome   • Pain and swelling of right lower leg   • Varicose veins of right lower extremity with pain   • PVCs (premature ventricular contractions)   • Lipoma of torso   • Rib pain   • Pain of upper abdomen   • Acute maxillary sinusitis   • Bruising     Past Surgical History:   Procedure Laterality Date   •  "PELVISCOPY N/A 9/9/2018    Procedure: PELVISCOPY;  Surgeon: Dianne Martinez M.D.;  Location: SURGERY [facility];  Service: Gyn Robotic   • OOPHORECTOMY Left 9/9/2018    Procedure: SALPINGOOPHORECTOMY;  Surgeon: Dianne Martinez M.D.;  Location: SURGERY [facility];  Service: Gyn Robotic   • REPEAT C SECTION  6/18/2014    Performed by Corinne E Capurro, M.D. at LABOR AND DELIVERY   • GYN SURGERY  2007    csection   • OTHER  2003    L BREAST LUMPECTOMY   • BLANCA BY LAPAROSCOPY     • CHOLECYSTECTOMY     • OTHER      gallbladder removed   • OTHER      cardiac ablations x 6   • OTHER CARDIAC SURGERY      6 ablation   • PRIMARY C SECTION     • ZZZ IMPLANTABLE CARDIOVERTER DEFIBRILLATOR (ICD)       Social History     Tobacco Use   • Smoking status: Never Smoker   • Smokeless tobacco: Never Used   Vaping Use   • Vaping Use: Never used   Substance Use Topics   • Alcohol use: No     Alcohol/week: 0.0 oz   • Drug use: No      Family History   Problem Relation Age of Onset   • Asthma Mother    • Lung Disease Mother         PE   • Other Mother         clotting disease   • Thyroid Mother    • Lung Disease Son         sees pediatric pulmonologist   • Asthma Son    • Diabetes Father    • Thyroid Sister    • Diabetes Maternal Aunt    • Other Maternal Grandfather         CHF   • Diabetes Paternal Aunt    • Diabetes Paternal Grandmother    • Psychiatric Illness Paternal Grandmother         alzheimers      (Allergies, Medications, & Tobacco/Substance Use were reconciled by the Medical Assistant and reviewed by myself. The family history is prepopulated)     Objective:     /68   Pulse 77   Temp 36.6 °C (97.9 °F) (Temporal)   Resp 20   Ht 1.626 m (5' 4\")   Wt 88.5 kg (195 lb)   SpO2 97%   BMI 33.47 kg/m²     Physical Exam  Vitals reviewed.   Constitutional:       General: She is not in acute distress.     Appearance: Normal appearance. She is not ill-appearing or diaphoretic.   HENT:      Head: Normocephalic and " atraumatic.      Nose: Nose normal.      Mouth/Throat:      Mouth: Mucous membranes are moist.      Pharynx: No oropharyngeal exudate or posterior oropharyngeal erythema.   Eyes:      Conjunctiva/sclera: Conjunctivae normal.   Cardiovascular:      Rate and Rhythm: Normal rate and regular rhythm.      Heart sounds: Normal heart sounds. No murmur heard.  No friction rub. No gallop.    Pulmonary:      Effort: Pulmonary effort is normal. No respiratory distress.      Breath sounds: Wheezing present. No rhonchi or rales.      Comments: Minimal left-sided upper respiratory wheezing  Musculoskeletal:         General: Normal range of motion.      Cervical back: Normal range of motion and neck supple.   Skin:     General: Skin is warm and dry.      Findings: No rash.   Neurological:      General: No focal deficit present.      Mental Status: She is alert and oriented to person, place, and time.   Psychiatric:         Mood and Affect: Mood normal.         Behavior: Behavior normal.         Thought Content: Thought content normal.         Judgment: Judgment normal.         Assessment/Plan:     1. COVID-19     2. Cough  promethazine-dextromethorphan (PROMETHAZINE-DM) 6.25-15 MG/5ML syrup    benzonatate (TESSALON) 100 MG Cap   3. History of asthma       Continue with over-the-counter cold and cough medications and Tylenol/Motrin for symptomatic relief of symptoms related to COVID-19 viral infection.  Continue with previously prescribed albuterol nebulizer treatments for wheezing.  Patient given cough suppressants.  Continue with Mucinex.  Drink plenty of fluids and rest.  Continue to monitor for worsening of symptoms including decreased oxygen saturation or respiratory distress.  Discussed red flags and ED precautions.    Differential diagnosis, natural history, supportive care, and indications for immediate follow-up discussed.    Advised the patient to follow-up with the primary care physician for recheck, reevaluation, and  consideration of further management.  Patient verbalized understanding of treatment plan and has no further questions regarding care.     I personally reviewed prior external notes and test results pertinent to today's visit.     Please note that this dictation was created using voice recognition software. I have made a reasonable attempt to correct obvious errors, but I expect that there are errors of grammar and possibly content that I did not discover before finalizing the note.    This note was electronically signed by Kay Kim PA-C

## 2022-02-07 ENCOUNTER — OFFICE VISIT (OUTPATIENT)
Dept: MEDICAL GROUP | Facility: PHYSICIAN GROUP | Age: 42
End: 2022-02-07
Payer: COMMERCIAL

## 2022-02-07 VITALS
SYSTOLIC BLOOD PRESSURE: 122 MMHG | WEIGHT: 204.2 LBS | OXYGEN SATURATION: 96 % | RESPIRATION RATE: 20 BRPM | DIASTOLIC BLOOD PRESSURE: 74 MMHG | HEIGHT: 64 IN | TEMPERATURE: 97.4 F | BODY MASS INDEX: 34.86 KG/M2 | HEART RATE: 92 BPM

## 2022-02-07 DIAGNOSIS — Z00.00 WELLNESS EXAMINATION: ICD-10-CM

## 2022-02-07 DIAGNOSIS — M62.838 MUSCLE SPASM: ICD-10-CM

## 2022-02-07 DIAGNOSIS — R05.9 COUGH: ICD-10-CM

## 2022-02-07 DIAGNOSIS — J45.21 MILD INTERMITTENT ASTHMA WITH ACUTE EXACERBATION: ICD-10-CM

## 2022-02-07 PROCEDURE — 99214 OFFICE O/P EST MOD 30 MIN: CPT | Performed by: NURSE PRACTITIONER

## 2022-02-07 RX ORDER — ALBUTEROL SULFATE 90 UG/1
2 AEROSOL, METERED RESPIRATORY (INHALATION) EVERY 6 HOURS PRN
COMMUNITY
End: 2023-08-29

## 2022-02-07 RX ORDER — METHYLPREDNISOLONE 4 MG/1
TABLET ORAL
Qty: 21 TABLET | Refills: 0 | Status: SHIPPED | OUTPATIENT
Start: 2022-02-07 | End: 2023-03-26

## 2022-02-07 RX ORDER — TIZANIDINE HYDROCHLORIDE 2 MG/1
2 CAPSULE, GELATIN COATED ORAL 3 TIMES DAILY
Qty: 30 CAPSULE | Refills: 0 | Status: SHIPPED | OUTPATIENT
Start: 2022-02-07 | End: 2023-03-26

## 2022-02-07 ASSESSMENT — ENCOUNTER SYMPTOMS
HEMOPTYSIS: 0
SHORTNESS OF BREATH: 1
PALPITATIONS: 0
SPUTUM PRODUCTION: 0
WHEEZING: 1
CHILLS: 0
FEVER: 0
COUGH: 1

## 2022-02-07 ASSESSMENT — FIBROSIS 4 INDEX: FIB4 SCORE: 0.36

## 2022-02-07 ASSESSMENT — PATIENT HEALTH QUESTIONNAIRE - PHQ9: CLINICAL INTERPRETATION OF PHQ2 SCORE: 0

## 2022-02-07 NOTE — PROGRESS NOTES
Subjective:     Chief Complaint   Patient presents with   • Cough     wants PCP to listen to lungs per instructions of , back and ribs feel like they are on fire        HPI:   Heidi presents today with     Problem   Mild Intermittent Asthma With Acute Exacerbation    Chronic in nature.  Asthma is overall stable.  States that generally she rarely uses her rescue inhaler.  States that recently she has been using albuterol nebulizer prior to bed most days and states that she is still having severe coughing fits causing chest wall pain and burning after COVID-19.  She states that her chest feels tight, states she is having difficulty taking big deep breaths.     Cough    + COVID test on 1/18. States she has been using cough syrup. States that she has been taking 800mg ibuprofen once a day. States that she has continued to have worsening episodes of coughing fit. States that she has been otherwise feeling well. States that her ribs have felt like they are on fire.  States that she has used the medication provided but states that she is struggling as nothing seems to be making the symptoms particularly better.  She states she feels like the cough has overall gotten worse.  Denies any shortness of breath except after coughing.         Current Outpatient Medications Ordered in Epic   Medication Sig Dispense Refill   • albuterol 108 (90 Base) MCG/ACT Aero Soln inhalation aerosol Inhale 2 Puffs every 6 hours as needed.     • albuterol (PROVENTIL) 2.5mg/0.5ml Nebu Soln Take 2.5 mg by nebulization every four hours as needed.     • methylPREDNISolone (MEDROL DOSEPAK) 4 MG Tablet Therapy Pack As directed on the packaging label. 21 Tablet 0   • tizanidine (ZANAFLEX) 2 MG capsule Take 1 Capsule by mouth 3 times a day. 30 Capsule 0     No current Epic-ordered facility-administered medications on file.       Health Maintenance: We will discuss at follow-up    Review of Systems   Constitutional: Negative for chills and fever.  "  Respiratory: Positive for cough, shortness of breath and wheezing. Negative for hemoptysis and sputum production.    Cardiovascular: Negative for chest pain and palpitations.   Genitourinary: Negative for dysuria, frequency and urgency.         Objective:     Exam:  /74 (BP Location: Right arm, Patient Position: Sitting, BP Cuff Size: Large adult)   Pulse 92   Temp 36.3 °C (97.4 °F) (Temporal)   Resp 20   Ht 1.626 m (5' 4\")   Wt 92.6 kg (204 lb 3.2 oz)   SpO2 96%   BMI 35.05 kg/m²  Body mass index is 35.05 kg/m².    Physical Exam  Constitutional:       Appearance: Normal appearance.   HENT:      Head: Normocephalic and atraumatic.      Right Ear: Tympanic membrane, ear canal and external ear normal.      Left Ear: Tympanic membrane, ear canal and external ear normal.      Mouth/Throat:      Mouth: Mucous membranes are moist.   Cardiovascular:      Rate and Rhythm: Normal rate and regular rhythm.      Pulses: Normal pulses.      Heart sounds: Normal heart sounds.   Pulmonary:      Effort: Pulmonary effort is normal.      Breath sounds: Wheezing present.      Comments: Wheezing present bilaterally, decreased airflow noted.  Chest:      Chest wall: Tenderness present.   Skin:     General: Skin is warm and dry.   Neurological:      General: No focal deficit present.      Mental Status: She is alert and oriented to person, place, and time.   Psychiatric:         Mood and Affect: Mood normal.         Behavior: Behavior normal.         Thought Content: Thought content normal.         Judgment: Judgment normal.       Assessment & Plan:     41 y.o. female with the following -     Problem List Items Addressed This Visit     Cough     -Continue cough syrup as needed  -Asthma exacerbation, see above         Relevant Medications    methylPREDNISolone (MEDROL DOSEPAK) 4 MG Tablet Therapy Pack    Mild intermittent asthma with acute exacerbation     -Positive mild wheezing on exam  -Medrol Dosepak provided for asthma " exacerbation  -Patient will contact this provider if she is not having resolution of symptoms or improvement by end of week.         Relevant Medications    albuterol 108 (90 Base) MCG/ACT Aero Soln inhalation aerosol    albuterol (PROVENTIL) 2.5mg/0.5ml Nebu Soln    methylPREDNISolone (MEDROL DOSEPAK) 4 MG Tablet Therapy Pack      Other Visit Diagnoses     Wellness examination        Relevant Orders    CBC WITH DIFFERENTIAL    Comp Metabolic Panel    Lipid Profile    Muscle spasm        Relevant Medications    tizanidine (ZANAFLEX) 2 MG capsule          Return in about 3 months (around 5/7/2022), or if symptoms worsen or fail to improve.    I have placed the below orders and discussed them with an approved delegating provider. The MA is performing the below orders under the direction of Dr. Hays.     Please note that this dictation was created using voice recognition software. I have made every reasonable attempt to correct obvious errors, but I expect that there are errors of grammar and possibly content that I did not discover before finalizing the note.

## 2022-02-14 ENCOUNTER — PATIENT MESSAGE (OUTPATIENT)
Dept: MEDICAL GROUP | Facility: PHYSICIAN GROUP | Age: 42
End: 2022-02-14

## 2022-02-14 DIAGNOSIS — R05.9 COUGH: ICD-10-CM

## 2022-02-14 DIAGNOSIS — J45.21 MILD INTERMITTENT ASTHMA WITH ACUTE EXACERBATION: ICD-10-CM

## 2022-02-14 RX ORDER — BUDESONIDE 90 UG/1
2 AEROSOL, POWDER RESPIRATORY (INHALATION) 2 TIMES DAILY
Qty: 1 EACH | Refills: 0 | Status: SHIPPED | OUTPATIENT
Start: 2022-02-14 | End: 2023-03-26

## 2022-02-14 RX ORDER — ALBUTEROL SULFATE 90 UG/1
2 AEROSOL, METERED RESPIRATORY (INHALATION) EVERY 4 HOURS PRN
Qty: 1 EACH | Refills: 0 | Status: SHIPPED | OUTPATIENT
Start: 2022-02-14

## 2022-02-15 ENCOUNTER — HOSPITAL ENCOUNTER (OUTPATIENT)
Dept: RADIOLOGY | Facility: MEDICAL CENTER | Age: 42
End: 2022-02-15
Attending: NURSE PRACTITIONER
Payer: COMMERCIAL

## 2022-02-15 DIAGNOSIS — R05.9 COUGH: ICD-10-CM

## 2022-02-15 PROCEDURE — 71046 X-RAY EXAM CHEST 2 VIEWS: CPT

## 2022-07-20 NOTE — TELEPHONE ENCOUNTER
1. [facility]                       Call Back Number: [phone]    2. Message: Pt has an appointment today with [facility] cardiology. Needs Urgent Referral. Referral pended please sign.     3. Patient approves office to leave a detailed voicemail/MyChart message: N\A      
Medical Orders for Life-Sustaining Treatment (MOLST)

## 2022-07-21 ENCOUNTER — HOSPITAL ENCOUNTER (EMERGENCY)
Facility: MEDICAL CENTER | Age: 42
End: 2022-07-21
Attending: EMERGENCY MEDICINE
Payer: COMMERCIAL

## 2022-07-21 ENCOUNTER — APPOINTMENT (OUTPATIENT)
Dept: RADIOLOGY | Facility: MEDICAL CENTER | Age: 42
End: 2022-07-21
Attending: EMERGENCY MEDICINE
Payer: COMMERCIAL

## 2022-07-21 VITALS
DIASTOLIC BLOOD PRESSURE: 85 MMHG | TEMPERATURE: 97.4 F | OXYGEN SATURATION: 97 % | SYSTOLIC BLOOD PRESSURE: 129 MMHG | HEART RATE: 65 BPM | WEIGHT: 198 LBS | BODY MASS INDEX: 33.8 KG/M2 | HEIGHT: 64 IN | RESPIRATION RATE: 16 BRPM

## 2022-07-21 DIAGNOSIS — R07.9 CHEST PAIN, UNSPECIFIED TYPE: ICD-10-CM

## 2022-07-21 LAB
ALBUMIN SERPL BCP-MCNC: 4.5 G/DL (ref 3.2–4.9)
ALBUMIN/GLOB SERPL: 1.9 G/DL
ALP SERPL-CCNC: 68 U/L (ref 30–99)
ALT SERPL-CCNC: 19 U/L (ref 2–50)
ANION GAP SERPL CALC-SCNC: 13 MMOL/L (ref 7–16)
AST SERPL-CCNC: 14 U/L (ref 12–45)
BASOPHILS # BLD AUTO: 0.7 % (ref 0–1.8)
BASOPHILS # BLD: 0.06 K/UL (ref 0–0.12)
BILIRUB SERPL-MCNC: 1.5 MG/DL (ref 0.1–1.5)
BUN SERPL-MCNC: 9 MG/DL (ref 8–22)
CALCIUM SERPL-MCNC: 9.2 MG/DL (ref 8.5–10.5)
CHLORIDE SERPL-SCNC: 105 MMOL/L (ref 96–112)
CO2 SERPL-SCNC: 19 MMOL/L (ref 20–33)
CREAT SERPL-MCNC: 0.43 MG/DL (ref 0.5–1.4)
EKG IMPRESSION: NORMAL
EOSINOPHIL # BLD AUTO: 0.24 K/UL (ref 0–0.51)
EOSINOPHIL NFR BLD: 2.9 % (ref 0–6.9)
ERYTHROCYTE [DISTWIDTH] IN BLOOD BY AUTOMATED COUNT: 39.3 FL (ref 35.9–50)
GFR SERPLBLD CREATININE-BSD FMLA CKD-EPI: 123 ML/MIN/1.73 M 2
GLOBULIN SER CALC-MCNC: 2.4 G/DL (ref 1.9–3.5)
GLUCOSE SERPL-MCNC: 94 MG/DL (ref 65–99)
HCT VFR BLD AUTO: 43.9 % (ref 37–47)
HGB BLD-MCNC: 16.2 G/DL (ref 12–16)
IMM GRANULOCYTES # BLD AUTO: 0.06 K/UL (ref 0–0.11)
IMM GRANULOCYTES NFR BLD AUTO: 0.7 % (ref 0–0.9)
LYMPHOCYTES # BLD AUTO: 2.35 K/UL (ref 1–4.8)
LYMPHOCYTES NFR BLD: 28.2 % (ref 22–41)
MCH RBC QN AUTO: 30.8 PG (ref 27–33)
MCHC RBC AUTO-ENTMCNC: 36.9 G/DL (ref 33.6–35)
MCV RBC AUTO: 83.5 FL (ref 81.4–97.8)
MONOCYTES # BLD AUTO: 0.76 K/UL (ref 0–0.85)
MONOCYTES NFR BLD AUTO: 9.1 % (ref 0–13.4)
NEUTROPHILS # BLD AUTO: 4.85 K/UL (ref 2–7.15)
NEUTROPHILS NFR BLD: 58.4 % (ref 44–72)
NRBC # BLD AUTO: 0 K/UL
NRBC BLD-RTO: 0 /100 WBC
PLATELET # BLD AUTO: 251 K/UL (ref 164–446)
PMV BLD AUTO: 10.9 FL (ref 9–12.9)
POTASSIUM SERPL-SCNC: 4.5 MMOL/L (ref 3.6–5.5)
PROT SERPL-MCNC: 6.9 G/DL (ref 6–8.2)
RBC # BLD AUTO: 5.26 M/UL (ref 4.2–5.4)
SODIUM SERPL-SCNC: 137 MMOL/L (ref 135–145)
TROPONIN T SERPL-MCNC: <6 NG/L (ref 6–19)
WBC # BLD AUTO: 8.3 K/UL (ref 4.8–10.8)

## 2022-07-21 PROCEDURE — 93005 ELECTROCARDIOGRAM TRACING: CPT | Performed by: EMERGENCY MEDICINE

## 2022-07-21 PROCEDURE — 93005 ELECTROCARDIOGRAM TRACING: CPT

## 2022-07-21 PROCEDURE — 99284 EMERGENCY DEPT VISIT MOD MDM: CPT

## 2022-07-21 PROCEDURE — 85025 COMPLETE CBC W/AUTO DIFF WBC: CPT

## 2022-07-21 PROCEDURE — 84484 ASSAY OF TROPONIN QUANT: CPT

## 2022-07-21 PROCEDURE — 80053 COMPREHEN METABOLIC PANEL: CPT

## 2022-07-21 PROCEDURE — 36415 COLL VENOUS BLD VENIPUNCTURE: CPT

## 2022-07-21 PROCEDURE — 71045 X-RAY EXAM CHEST 1 VIEW: CPT

## 2022-07-21 ASSESSMENT — LIFESTYLE VARIABLES: DO YOU DRINK ALCOHOL: NO

## 2022-07-21 NOTE — ED PROVIDER NOTES
ED Provider Note    CHIEF COMPLAINT  Chief Complaint   Patient presents with   • Chest Pain     Pt reports left sided sharp chest pressure starting this morning that radiates to her back and left arm. Pt reports that she has a history of heart conditions with ablations and wanted to get her heart checked out. Pt received 324mg of aspirin from EMS.        HPI  Heidi Gaspar is a 42 y.o. female who presents left-sided chest discomfort radiating to her left arm.  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  Does not have a pacemaker or AICD in place..  She is followed by Dr. Rodriguez from cardiology.  She notes that occasionally she has episodes/attacks of tachycardia lasting 20 or so minutes at a time.  She typically tries things like cold showers for her tachycardic episodes.  These did not work this time.  She estimates she gets these episodes a few times per month however has never had pain like this today.  Last episode was about a week ago lasting about an hour.  She notes a severe tightness in her left chest rating to her left neck and shoulder this morning while at rest.  She asked her significant other to take her to the hospital and on route, they stopped by a fire station because she was feeling so horrible at that time.  She was placed on a cardiac monitor while at the fire station and she spontaneously converted from heart rate of about 220 bpm to slightly over 100 bpm.  She immediately felt improvement in her symptoms after she converted.  She was then brought to the emergency department and reports feeling much improved at this time.  No vomiting.  No abdominal pain.  No lower extremity swelling or discomfort.  No shortness of breath.  No fever or cough    REVIEW OF SYSTEMS  See HPI for further details. All other systems are negative.     PAST MEDICAL HISTORY   has a past medical history of Angina, Arrhythmia, ASTHMA,  "Bronchitis, Cardiac arrhythmia, Chickenpox, Influenza, Kidney, horseshoe, [employer] syndrome, Other acquired deformity of toe, Personal history of venous thrombosis and embolism, Pneumonia, PSVT (paroxysmal supraventricular tachycardia) ([facility]), S/P ablation of ventricular arrhythmia, Ramirez-Parkinson-White syndrome, and WPW (Marisela-Parkinson-White syndrome).    SOCIAL HISTORY  Social History     Tobacco Use   • Smoking status: Never Smoker   • Smokeless tobacco: Never Used   Vaping Use   • Vaping Use: Never used   Substance and Sexual Activity   • Alcohol use: No     Alcohol/week: 0.0 oz   • Drug use: No   • Sexual activity: Yes     Partners: Male     Birth control/protection: Surgical       SURGICAL HISTORY   has a past surgical history that includes zzz implantable cardioverter defibrillator (icd); qian by laparoscopy; other cardiac surgery; gyn surgery (2007); other (2003); other; repeat c section (6/18/2014); primary c section; cholecystectomy; other; pelviscopy (N/A, 9/9/2018); and oophorectomy (Left, 9/9/2018).    CURRENT MEDICATIONS  Home Medications     Reviewed by Vale Mares R.N. (Registered Nurse) on 07/21/22 at 1035  Med List Status: Not Addressed   Medication Last Dose Status   albuterol (PROVENTIL) 2.5mg/0.5ml Nebu Soln  Active   albuterol 108 (90 Base) MCG/ACT Aero Soln inhalation aerosol  Active   albuterol 108 (90 Base) MCG/ACT Aero Soln inhalation aerosol  Active   budesonide (PULMICORT FLEXHALER) 90 MCG/ACT AEROSOL POWDER, BREATH ACTIVATED  Active   methylPREDNISolone (MEDROL DOSEPAK) 4 MG Tablet Therapy Pack  Active   tizanidine (ZANAFLEX) 2 MG capsule  Active                ALLERGIES  Allergies   Allergen Reactions   • Inapsine [Droperidol] Anaphylaxis   • Inapsine [Droperidol] Anaphylaxis   • Keflex Rash and Swelling     RASH & SWELLING   • Morphine Unspecified     STOPS PEEING after \"A lot.\"     • Penicillins Rash and Swelling     RASH & SWELLING       PHYSICAL EXAM  VITAL SIGNS: BP (!) " "145/78   Pulse 78   Temp 36.3 °C (97.4 °F) (Temporal)   Resp 16   Ht 1.626 m (5' 4\")   Wt 89.8 kg (198 lb)   LMP 2022 (Exact Date)   SpO2 96%   BMI 33.99 kg/m²   Pulse ox interpretation: I interpret this pulse ox as normal.  Constitutional: Alert in no apparent distress.  HENT: No signs of trauma, Bilateral external ears normal, Nose normal.   Eyes: Conjunctiva normal, Non-icteric.   Neck: Normal range of motion, Supple, No stridor.   Cardiovascular: Regular rate and rhythm.   Thorax & Lungs: Normal breath sounds, No respiratory distress, No wheezing, No chest tenderness.   Abdomen: Soft, No tenderness, No masses, No pulsatile masses. No peritoneal signs.  Skin: Warm, Dry, No erythema, No rash.   Extremities: Intact distal pulses, No edema, No cyanosis  Musculoskeletal: Good range of motion in all major joints. No major deformities noted.   Neurologic: Alert, No focal deficits noted.         DIAGNOSTIC STUDIES / PROCEDURES    EKG - Physician interpretation  Results for orders placed or performed during the hospital encounter of 22   EKG   Result Value Ref Range    Report       [facility] Emergency Dept.    Test Date:  2022  Pt Name:    EMILE HOLLINGSWORTH              Department: ER  MRN:        [id number]                      Room:  Gender:     Female                       Technician: [id number]  :        1980                   Requested By:ER TRIAGE PROTOCOL  Order #:    [id number]                    Reading MD: LASHONDA PEREZ MD    Measurements  Intervals                                Axis  Rate:       72                           P:          45  VT:         180                          QRS:        -121  QRSD:       144                          T:          53  QT:         436  QTc:        478    Interpretive Statements  SINUS RHYTHM  RIGHT BUNDLE BRANCH BLOCK  Compared to ECG 2019 21:41:16  No significant changes  Electronically Signed On 2022 12:02:12 PDT by " "LASHONDA PEREZ MD           LABS  Labs Reviewed   CBC WITH DIFFERENTIAL - Abnormal; Notable for the following components:       Result Value    Hemoglobin 16.2 (*)     MCHC 36.9 (*)     All other components within normal limits   COMP METABOLIC PANEL - Abnormal; Notable for the following components:    Co2 19 (*)     Creatinine 0.43 (*)     All other components within normal limits   TROPONIN   ESTIMATED GFR         RADIOLOGY  DX-CHEST-PORTABLE (1 VIEW)   Final Result      No acute cardiac or pulmonary abnormalities are identified.            COURSE & MEDICAL DECISION MAKING    Medications - No data to display    Pertinent Labs & Imaging studies reviewed. (See chart for details)  42 y.o. female presenting with chest discomfort in the setting of a rapid heart rate.  Both symptoms have since resolved however.  She has a known history of tachybradycardia syndrome.  EKG upon arrival is unchanged from prior.  No active chest pain at this time.  Chest x-ray is unremarkable.  No obvious acute pulmonary abnormalities.  No significant risk factors for PE.  No tachycardia or hypoxia or respiratory distress.  Laboratory studies are largely unremarkable.  Troponin is negative as well.    Work-up to this point is rather reassuring.  I do suspect that the patient's chest discomfort he experienced earlier was rate related.  Recommending outpatient follow-up with her cardiologist for further outpatient management to discuss possible need for further outpatient cardiac monitoring    The patient will not drink alcohol nor drive with prescribed medications.   The patient was instructed to follow-up with primary care physician for further management.  To return immediately for any worsening symptoms or development of any other concerning signs or symptoms. The patient verbalizes understanding in their own words.    /85   Pulse 65   Temp 36.3 °C (97.4 °F) (Temporal)   Resp 16   Ht 1.626 m (5' 4\")   Wt 89.8 kg (198 lb)   LMP " 06/18/2022 (Exact Date)   SpO2 97%   BMI 33.99 kg/m²     The patient was referred to primary care where they will receive further BP management.      Federico Fritz, A.P.R.N.  [address]  [facility] [id number] [phone]    Schedule an appointment as soon as possible for a visit       [facility], Emergency Dept  [address] [facility] [address] [phone]    As needed, If symptoms worsen      FINAL IMPRESSION  1. Chest pain, unspecified type            Electronically signed by: Tobias Andrews M.D., 7/21/2022 12:01 PM

## 2022-07-21 NOTE — ED NOTES
Pt ambulated to red 9, blood drawn in waiting.  Pt changed into gown and placed on monitor.  ERP to see.

## 2022-07-21 NOTE — ED NOTES
Pt d/c from ED a/o x 4 GCS 15 ambulatory without assistance with steady gait. PIV removed with catheter intact. Pt given d/c instructions and verbalized understanding. Pt refused d/c vitals.

## 2022-07-21 NOTE — ED TRIAGE NOTES
"Chief Complaint   Patient presents with   • Chest Pain     Pt reports left sided sharp chest pressure starting this morning that radiates to her back and left arm. Pt reports that she has a history of heart conditions with ablations and wanted to get her heart checked out. Pt received 324mg of aspirin from EMS.      BP (!) 145/78   Pulse 78   Temp 36.3 °C (97.4 °F) (Temporal)   Resp 16   Ht 1.626 m (5' 4\")   Wt 89.8 kg (198 lb)   LMP 06/18/2022 (Exact Date)   SpO2 96%   BMI 33.99 kg/m²     Pt is wheeled in and out of triage in a hospital wheelchair. Appropriate PPE worn throughout entire encounter. Pt placed back in the lobby and educated about triage process.  EKG performed in triage.   "

## 2022-11-29 NOTE — ED NOTES
ERP bedside at this time.  
Patient returned from imaging  
Patient transported to imaging  
Pt ambulatory to GRN 26 with steady gait.  Pt provided a gown to change into.  Up for ERP evaluation.  
RN went through discharge paperwork with the pt. And emphasized the importance of follow up care. The pt. Was very receptive to instructions and follow up care. Pts. VS stable and pt. Denies having any pain at this time.   
Report to Yesy COBURN.    
Spouse

## 2023-01-17 ENCOUNTER — OFFICE VISIT (OUTPATIENT)
Dept: URGENT CARE | Facility: PHYSICIAN GROUP | Age: 43
End: 2023-01-17
Payer: COMMERCIAL

## 2023-01-17 VITALS
DIASTOLIC BLOOD PRESSURE: 76 MMHG | HEART RATE: 58 BPM | RESPIRATION RATE: 16 BRPM | OXYGEN SATURATION: 98 % | SYSTOLIC BLOOD PRESSURE: 124 MMHG | HEIGHT: 64 IN | TEMPERATURE: 97.7 F | BODY MASS INDEX: 34.66 KG/M2 | WEIGHT: 203 LBS

## 2023-01-17 DIAGNOSIS — H92.01 RIGHT EAR PAIN: ICD-10-CM

## 2023-01-17 DIAGNOSIS — J01.40 ACUTE PANSINUSITIS, RECURRENCE NOT SPECIFIED: ICD-10-CM

## 2023-01-17 PROCEDURE — 99214 OFFICE O/P EST MOD 30 MIN: CPT | Performed by: NURSE PRACTITIONER

## 2023-01-17 RX ORDER — DOXYCYCLINE HYCLATE 100 MG/1
100 CAPSULE ORAL 2 TIMES DAILY
Qty: 20 CAPSULE | Refills: 0 | Status: SHIPPED | OUTPATIENT
Start: 2023-01-17 | End: 2023-01-27

## 2023-01-17 RX ORDER — FLUTICASONE PROPIONATE 50 MCG
2 SPRAY, SUSPENSION (ML) NASAL DAILY
Qty: 16 G | Refills: 0 | Status: SHIPPED | OUTPATIENT
Start: 2023-01-17 | End: 2023-01-31

## 2023-01-17 ASSESSMENT — FIBROSIS 4 INDEX: FIB4 SCORE: 0.56

## 2023-01-17 NOTE — PROGRESS NOTES
"Heidi Gaspar is a 42 y.o. female who presents for Otalgia (Right ear pain x 1 week , jaw pain )      HPI  This is a new problem. Heidi Gaspar is a 42 y.o. patient who presents to urgent care with c/o: 8-9 days of right ear pain, right sided face pain, right eye pressure. Mild light headed sensation if she moves too quickly. Right eye had some discharge in it this morning.   Treatments tried: warm packs to face, tylenol.   Denies fever,  NVD, vision change, neck pain.   No other aggravating or alleviating factors.       ROS See HPI    Allergies:       Allergies   Allergen Reactions    Inapsine [Droperidol] Anaphylaxis    Inapsine [Droperidol] Anaphylaxis    Keflex Rash and Swelling     RASH & SWELLING    Morphine Unspecified     STOPS PEEING after \"A lot.\"      Penicillins Rash and Swelling     RASH & SWELLING       PMSFS Hx:  Past Medical History:   Diagnosis Date    Angina     Arrhythmia     ASTHMA     Bronchitis     Cardiac arrhythmia     Chickenpox     Influenza     Kidney, horseshoe     Cruz's syndrome     Other acquired deformity of toe     Personal history of venous thrombosis and embolism     states when she was little she had problems clotting    Pneumonia     PSVT (paroxysmal supraventricular tachycardia) ([facility])     S/P ablation of ventricular arrhythmia     x6 (most recent was 2006)    Ramirez-Parkinson-White syndrome     WPW (Marisela-Parkinson-White syndrome)      Past Surgical History:   Procedure Laterality Date    PELVISCOPY N/A 9/9/2018    Procedure: PELVISCOPY;  Surgeon: Dianne Martinez M.D.;  Location: SURGERY [facility];  Service: Gyn Robotic    OOPHORECTOMY Left 9/9/2018    Procedure: SALPINGOOPHORECTOMY;  Surgeon: Dianne Martinez M.D.;  Location: SURGERY [facility];  Service: Gyn Robotic    REPEAT C SECTION  6/18/2014    Performed by Corinne E Capurro, M.D. at [facility] DEL[facility]    GYN SURGERY  2007    csection    OTHER  2003    L BREAST LUMPECTOMY    BLANCA BY LAPAROSCOPY      " CHOLECYSTECTOMY      OTHER      gallbladder removed    OTHER      cardiac ablations x 6    OTHER CARDIAC SURGERY      6 ablation    PRIMARY C SECTION      ZZZ IMPLANTABLE CARDIOVERTER DEFIBRILLATOR (ICD)       Family History   Problem Relation Age of Onset    Asthma Mother     Lung Disease Mother         PE    Other Mother         clotting disease    Thyroid Mother     Lung Disease Son         sees pediatric pulmonologist    Asthma Son     Diabetes Father     Thyroid Sister     Diabetes Maternal Aunt     Other Maternal Grandfather         CHF    Diabetes Paternal Aunt     Diabetes Paternal Grandmother     Psychiatric Illness Paternal Grandmother         alzheimers     Social History     Tobacco Use    Smoking status: Never    Smokeless tobacco: Never   Substance Use Topics    Alcohol use: No     Alcohol/week: 0.0 oz       Problems:   Patient Active Problem List   Diagnosis    S/P ablation of accessory bypass tract    WPW (Marisela-Parkinson-White syndrome)    Cough    Family history of blood clots    Fatigue    Mild intermittent asthma with acute exacerbation    TMJ click    Varicose vein of leg    Torsion of left ovary and ovarian pedicle    Palpitations    Left posterior fascicular block    Right bundle branch block    Heterozygous factor V Leiden mutation (HCC)    Cruz syndrome    Pain and swelling of right lower leg    Varicose veins of right lower extremity with pain    PVCs (premature ventricular contractions)    Lipoma of torso    Rib pain    Pain of upper abdomen    Acute maxillary sinusitis    Bruising       Medications:   Current Outpatient Medications on File Prior to Visit   Medication Sig Dispense Refill    albuterol 108 (90 Base) MCG/ACT Aero Soln inhalation aerosol Inhale 2 Puffs every 6 hours as needed.      albuterol (PROVENTIL) 2.5mg/0.5ml Nebu Soln Take 2.5 mg by nebulization every four hours as needed.      albuterol 108 (90 Base) MCG/ACT Aero Soln inhalation aerosol Inhale 2 Puffs every four  "hours as needed for Shortness of Breath. 1 Each 0    budesonide (PULMICORT FLEXHALER) 90 MCG/ACT AEROSOL POWDER, BREATH ACTIVATED Inhale 2 Puffs 2 times a day. 1 Each 0    methylPREDNISolone (MEDROL DOSEPAK) 4 MG Tablet Therapy Pack As directed on the packaging label. 21 Tablet 0    tizanidine (ZANAFLEX) 2 MG capsule Take 1 Capsule by mouth 3 times a day. 30 Capsule 0     No current facility-administered medications on file prior to visit.          Objective:     /76 (BP Location: Right arm, Patient Position: Sitting, BP Cuff Size: Adult)   Pulse (!) 58   Temp 36.5 °C (97.7 °F) (Temporal)   Resp 16   Ht 1.626 m (5' 4\")   Wt 92.1 kg (203 lb)   SpO2 98%   BMI 34.84 kg/m²     Physical Exam  Vitals and nursing note reviewed.   Constitutional:       General: She is not in acute distress.     Appearance: Normal appearance. She is well-developed. She is not ill-appearing.   HENT:      Head: Normocephalic.        Right Ear: Hearing, ear canal and external ear normal. Tympanic membrane is erythematous and bulging.      Left Ear: Hearing, tympanic membrane, ear canal and external ear normal.      Nose: Mucosal edema and rhinorrhea present. Rhinorrhea is purulent.      Right Sinus: Maxillary sinus tenderness and frontal sinus tenderness present.      Left Sinus: Maxillary sinus tenderness present. No frontal sinus tenderness.      Mouth/Throat:      Pharynx: Uvula midline. Oropharyngeal exudate (PND) present.   Eyes:      General:         Right eye: No discharge.         Left eye: No discharge.      Conjunctiva/sclera:      Right eye: Right conjunctiva is injected.      Left eye: Left conjunctiva is injected.      Pupils: Pupils are equal, round, and reactive to light.   Neck:      Trachea: Trachea and phonation normal.   Cardiovascular:      Rate and Rhythm: Normal rate and regular rhythm.      Chest Wall: PMI is not displaced.      Pulses: Normal pulses.   Pulmonary:      Effort: Pulmonary effort is normal.      " Breath sounds: Normal breath sounds.   Musculoskeletal:      Cervical back: Full passive range of motion without pain, normal range of motion and neck supple.   Lymphadenopathy:      Head:      Right side of head: No tonsillar adenopathy.      Left side of head: No tonsillar adenopathy.      Cervical: No cervical adenopathy.      Upper Body:      Right upper body: No supraclavicular adenopathy.      Left upper body: No supraclavicular adenopathy.   Skin:     General: Skin is warm and dry.      Capillary Refill: Capillary refill takes less than 2 seconds.   Neurological:      General: No focal deficit present.      Mental Status: She is alert and oriented to person, place, and time.      Gait: Gait normal.   Psychiatric:         Mood and Affect: Mood normal.         Speech: Speech normal.         Behavior: Behavior normal. Behavior is cooperative.         Thought Content: Thought content normal.         Assessment /Associated Orders:      1. Acute pansinusitis, recurrence not specified  doxycycline (VIBRAMYCIN) 100 MG Cap    fluticasone (FLONASE) 50 MCG/ACT nasal spray      2. Right ear pain  fluticasone (FLONASE) 50 MCG/ACT nasal spray          Medical Decision Making:    Pt is clinically stable at today's acute urgent care visit.  No acute distress noted. Appropriate for outpatient care at this time.   Acute problem today .   Educated in proper administration of  prescription medication(s) ordered today including safety, possible SE, risks, benefits, rationale and alternatives to therapy.   OTC  analgesic of choice (acetaminophen or NSAID) prn pain. Follow manufactures dosing and safety precautions.      Humidifier at night prn   OTC antihistamine of choice. Follow manufactures dosing and safety guidelines.   OTC Saline irrigations or Neti pot rinse. Follow manufacturers recommendations.    Discussed Dx, management options (risks,benefits, and alternatives to planned treatment), natural progression and supportive  care.  Expressed understanding and the treatment plan was agreed upon.   Questions were encouraged and answered   Return to urgent care prn if new or worsening sx or if there is no improvement in condition prn.    Educated in Red flags and indications to immediately call 911 or present to the Emergency Department.       Time I spent evaluating Heidi Gaspar in urgent care today was 31  minutes. This time includes preparing for visit, reviewing any pertinent notes or test results, counseling/education, exam, obtaining HPI, interpretation of lab tests, medication management and documentation as indicated above.Time does not include separately billable procedures noted .       Please note that this dictation was created using voice recognition software. I have worked with consultants from the vendor as well as technical experts from [facility] to optimize the interface. I have made every reasonable attempt to correct obvious errors, but I expect that there are errors of grammar and possibly content that I did not discover before finalizing the note.  This note was electronically signed by provider

## 2023-03-26 ENCOUNTER — OFFICE VISIT (OUTPATIENT)
Dept: URGENT CARE | Facility: PHYSICIAN GROUP | Age: 43
End: 2023-03-26
Payer: COMMERCIAL

## 2023-03-26 VITALS
DIASTOLIC BLOOD PRESSURE: 76 MMHG | OXYGEN SATURATION: 98 % | SYSTOLIC BLOOD PRESSURE: 128 MMHG | HEART RATE: 100 BPM | BODY MASS INDEX: 35.61 KG/M2 | HEIGHT: 64 IN | RESPIRATION RATE: 16 BRPM | TEMPERATURE: 98.5 F | WEIGHT: 208.56 LBS

## 2023-03-26 DIAGNOSIS — J20.9 ACUTE BRONCHITIS, UNSPECIFIED ORGANISM: ICD-10-CM

## 2023-03-26 DIAGNOSIS — J45.20 MILD INTERMITTENT ASTHMA WITHOUT COMPLICATION: ICD-10-CM

## 2023-03-26 PROCEDURE — 99214 OFFICE O/P EST MOD 30 MIN: CPT | Performed by: NURSE PRACTITIONER

## 2023-03-26 RX ORDER — BENZONATATE 200 MG/1
200 CAPSULE ORAL 3 TIMES DAILY PRN
Qty: 60 CAPSULE | Refills: 0 | Status: SHIPPED | OUTPATIENT
Start: 2023-03-26 | End: 2023-06-14

## 2023-03-26 RX ORDER — METHYLPREDNISOLONE 4 MG/1
TABLET ORAL
Qty: 21 TABLET | Refills: 0 | Status: SHIPPED | OUTPATIENT
Start: 2023-03-26 | End: 2023-06-14

## 2023-03-26 ASSESSMENT — ENCOUNTER SYMPTOMS
HEADACHES: 0
SPUTUM PRODUCTION: 0
DIARRHEA: 0
COUGH: 1
WHEEZING: 1
SHORTNESS OF BREATH: 0
MYALGIAS: 0
NAUSEA: 0
SORE THROAT: 0
CHILLS: 0
FEVER: 0

## 2023-03-26 ASSESSMENT — FIBROSIS 4 INDEX: FIB4 SCORE: 0.56

## 2023-03-26 NOTE — PROGRESS NOTES
Subjective     Heidi Gaspar is a 42 y.o. female who presents with Cough (X 5 days , dry cough . Bilateral ear pain started yesterday , wheezing . Hx of asthma )            HPI  New problem.  Patient is a very pleasant 42-year-old female with a 5-day history of dry cough.  She also started with bilateral ear pain yesterday and wheezing.  She does have a history of mild intermittent asthma and at one time was prescribed budesonide however is no longer taking it.  She denies fever, chills, shortness of breath, nasal congestion, sore throat.  She denies vomiting, or diarrhea.  She has not taken any medications for the symptoms.  She has not used her albuterol inhaler as she has not felt the need to.    Inapsine [droperidol], Inapsine [droperidol], Keflex, Morphine, and Penicillins  Current Outpatient Medications on File Prior to Visit   Medication Sig Dispense Refill    albuterol 108 (90 Base) MCG/ACT Aero Soln inhalation aerosol Inhale 2 Puffs every four hours as needed for Shortness of Breath. 1 Each 0    albuterol 108 (90 Base) MCG/ACT Aero Soln inhalation aerosol Inhale 2 Puffs every 6 hours as needed.      albuterol (PROVENTIL) 2.5mg/0.5ml Nebu Soln Take 2.5 mg by nebulization every four hours as needed.       No current facility-administered medications on file prior to visit.     Breast Cancer-related family history is not on file.  Social History     Socioeconomic History    Marital status:      Spouse name: Not on file    Number of children: Not on file    Years of education: Not on file    Highest education level: Not on file   Occupational History    Not on file   Tobacco Use    Smoking status: Never    Smokeless tobacco: Never   Vaping Use    Vaping Use: Never used   Substance and Sexual Activity    Alcohol use: No     Alcohol/week: 0.0 oz    Drug use: No    Sexual activity: Yes     Partners: Male     Birth control/protection: Surgical   Other Topics Concern    Not on file   Social History  "Narrative    Not on file     Social Determinants of Health     Financial Resource Strain: Not on file   Food Insecurity: Not on file   Transportation Needs: Not on file   Physical Activity: Not on file   Stress: Not on file   Social Connections: Not on file   Intimate Partner Violence: Not on file   Housing Stability: Not on file         Review of Systems   Constitutional:  Negative for chills, fever and malaise/fatigue.   HENT:  Positive for ear pain. Negative for congestion and sore throat.    Respiratory:  Positive for cough and wheezing. Negative for sputum production and shortness of breath.    Gastrointestinal:  Negative for diarrhea and nausea.   Musculoskeletal:  Negative for myalgias.   Neurological:  Negative for headaches.   All other systems reviewed and are negative.           Objective     /76 (BP Location: Left arm, Patient Position: Sitting, BP Cuff Size: Adult)   Pulse 100   Temp 36.9 °C (98.5 °F) (Temporal)   Resp 16   Ht 1.626 m (5' 4\")   Wt 94.6 kg (208 lb 8.9 oz)   SpO2 98%   BMI 35.80 kg/m²      Physical Exam  Vitals and nursing note reviewed.   Constitutional:       General: She is not in acute distress.     Appearance: She is well-developed.   HENT:      Head: Normocephalic.      Right Ear: Tympanic membrane and external ear normal.      Left Ear: Tympanic membrane and external ear normal.      Nose: Mucosal edema and rhinorrhea present.      Mouth/Throat:      Pharynx: No posterior oropharyngeal erythema.   Eyes:      General:         Right eye: No discharge.         Left eye: No discharge.      Conjunctiva/sclera: Conjunctivae normal.   Cardiovascular:      Rate and Rhythm: Normal rate and regular rhythm.      Heart sounds: Normal heart sounds.   Pulmonary:      Effort: Pulmonary effort is normal.      Breath sounds: Normal breath sounds. No wheezing or rales.   Musculoskeletal:         General: Normal range of motion.      Cervical back: Normal range of motion and neck supple. "   Lymphadenopathy:      Cervical: No cervical adenopathy.   Skin:     General: Skin is warm and dry.   Neurological:      Mental Status: She is alert and oriented to person, place, and time.   Psychiatric:         Behavior: Behavior normal.         Thought Content: Thought content normal.                           Assessment & Plan        1. Acute bronchitis, unspecified organism  methylPREDNISolone (MEDROL DOSEPAK) 4 MG Tablet Therapy Pack    benzonatate (TESSALON) 200 MG capsule      2. Mild intermittent asthma without complication        Albuterol as needed.  Differential diagnosis, natural history, supportive care, and indications for immediate follow-up were discussed.

## 2023-03-30 ENCOUNTER — HOSPITAL ENCOUNTER (OUTPATIENT)
Dept: RADIOLOGY | Facility: MEDICAL CENTER | Age: 43
End: 2023-03-30
Attending: FAMILY MEDICINE
Payer: COMMERCIAL

## 2023-03-30 ENCOUNTER — OFFICE VISIT (OUTPATIENT)
Dept: URGENT CARE | Facility: PHYSICIAN GROUP | Age: 43
End: 2023-03-30
Payer: COMMERCIAL

## 2023-03-30 VITALS
DIASTOLIC BLOOD PRESSURE: 86 MMHG | OXYGEN SATURATION: 97 % | WEIGHT: 208 LBS | TEMPERATURE: 97.3 F | HEART RATE: 65 BPM | SYSTOLIC BLOOD PRESSURE: 134 MMHG | RESPIRATION RATE: 20 BRPM | HEIGHT: 64 IN | BODY MASS INDEX: 35.51 KG/M2

## 2023-03-30 DIAGNOSIS — R05.9 COUGH, UNSPECIFIED TYPE: ICD-10-CM

## 2023-03-30 DIAGNOSIS — J45.41 MODERATE PERSISTENT ASTHMA WITH ACUTE EXACERBATION: ICD-10-CM

## 2023-03-30 PROCEDURE — 99214 OFFICE O/P EST MOD 30 MIN: CPT | Performed by: FAMILY MEDICINE

## 2023-03-30 PROCEDURE — 71046 X-RAY EXAM CHEST 2 VIEWS: CPT

## 2023-03-30 RX ORDER — FLUTICASONE PROPIONATE 220 UG/1
1 AEROSOL, METERED RESPIRATORY (INHALATION) 2 TIMES DAILY
Qty: 1 EACH | Refills: 0 | Status: SHIPPED | OUTPATIENT
Start: 2023-03-30 | End: 2023-06-14

## 2023-03-30 RX ORDER — BENZONATATE 200 MG/1
200 CAPSULE ORAL 3 TIMES DAILY PRN
Qty: 45 CAPSULE | Refills: 0 | Status: SHIPPED | OUTPATIENT
Start: 2023-03-30 | End: 2023-06-14

## 2023-03-30 ASSESSMENT — FIBROSIS 4 INDEX: FIB4 SCORE: 0.56

## 2023-03-30 NOTE — PROGRESS NOTES
CC:  cough        Cough  This is a new problem. The current episode started 7  days ago. The problem has been unchanged. The problem occurs constantly. The cough is dry.       DENIES: fatigue, muscle aches, fever.       + wheezing.     She has asthma and was recently on medrol, but continues to have the wheezing.               Past Medical History:   Diagnosis Date    Angina     Arrhythmia     ASTHMA     Bronchitis     Cardiac arrhythmia     Chickenpox     Influenza     Kidney, horseshoe     Cruz's syndrome     Other acquired deformity of toe     Personal history of venous thrombosis and embolism     states when she was little she had problems clotting    Pneumonia     PSVT (paroxysmal supraventricular tachycardia) ([facility])     S/P ablation of ventricular arrhythmia     x6 (most recent was 2006)    Ramirez-Parkinson-White syndrome     WPW (Marisela-Parkinson-White syndrome)          Social History     Tobacco Use    Smoking status: Never    Smokeless tobacco: Never   Vaping Use    Vaping Use: Never used   Substance Use Topics    Alcohol use: No     Alcohol/week: 0.0 oz    Drug use: No         Current Outpatient Medications on File Prior to Visit   Medication Sig Dispense Refill    methylPREDNISolone (MEDROL DOSEPAK) 4 MG Tablet Therapy Pack Follow schedule on package instructions. 21 Tablet 0    benzonatate (TESSALON) 200 MG capsule Take 1 Capsule by mouth 3 times a day as needed for Cough. 60 Capsule 0    albuterol 108 (90 Base) MCG/ACT Aero Soln inhalation aerosol Inhale 2 Puffs every four hours as needed for Shortness of Breath. 1 Each 0    albuterol 108 (90 Base) MCG/ACT Aero Soln inhalation aerosol Inhale 2 Puffs every 6 hours as needed.      albuterol (PROVENTIL) 2.5mg/0.5ml Nebu Soln Take 2.5 mg by nebulization every four hours as needed.       No current facility-administered medications on file prior to visit.                    Review of Systems   Constitutional: Negative for fever and weight loss.   HENT:  "negative for otalgia  Cardiovascular - denies chest pain or dyspnea  Respiratory: Positive for cough.  .  + for wheezing.    Neurological: Negative for headaches.   GI - denies nausea, vomiting or diarrhea  Neuro - denies numbness or tingling.            Objective:     /86   Pulse 65   Temp 36.3 °C (97.3 °F)   Resp 20   Ht 1.626 m (5' 4\")   Wt 94.3 kg (208 lb)   SpO2 97%     Physical Exam   Constitutional: patient is oriented to person, place, and time. Patient appears well-developed and well-nourished. No distress.   HENT:   Head: Normocephalic and atraumatic.   Right Ear: External ear normal.   Left Ear: External ear normal.   Nose: Mucosal edema  present. Right sinus exhibits no maxillary sinus tenderness. Left sinus exhibits no maxillary sinus tenderness.   Mouth/Throat: Mucous membranes are normal. No oral lesions.  No posterior pharyngeal erythema.  No oropharyngeal exudate or posterior oropharyngeal edema.   Eyes: Conjunctivae and EOM are normal. Pupils are equal, round, and reactive to light. Right eye exhibits no discharge. Left eye exhibits no discharge. No scleral icterus.   Neck: Normal range of motion. Neck supple. No tracheal deviation present.   Cardiovascular: Normal rate, regular rhythm and normal heart sounds.  Exam reveals no friction rub.    Pulmonary/Chest: Effort normal. No respiratory distress. Patient has no wheezes or rhonchi. Patient has no rales.    Musculoskeletal:  exhibits no edema.   Lymphadenopathy:     Patient has no cervical adenopathy.      Neurological: patient is alert and oriented to person, place, and time.   Skin: Skin is warm and dry. No rash noted. No erythema.   Psychiatric: patient  has a normal mood and affect.  behavior is normal.   Nursing note and vitals reviewed.              Assessment/Plan:          Moderate persistent asthma with acute exacerbation  Chest x-ray was personally interpreted and reviewed. No acute cardiopulmonary findings. No pulmonary " infiltrates or densities. Cardiac silhouette is normal. No hemidiaphragm elevation. No bony abnormalities.    Advised to finish course of medrol      Will start on ICS        - fluticasone (FLOVENT HFA) 220 MCG/ACT Aerosol; Inhale 1 Puff 2 times a day.  Dispense: 1 Each; Refill: 0  - Referral to Pulmonary and Sleep Medicine        Follow up in one week if no improvement, sooner if symptoms worsen.

## 2023-04-05 ENCOUNTER — TELEPHONE (OUTPATIENT)
Dept: HEALTH INFORMATION MANAGEMENT | Facility: OTHER | Age: 43
End: 2023-04-05
Payer: COMMERCIAL

## 2023-04-06 ENCOUNTER — TELEPHONE (OUTPATIENT)
Dept: HEALTH INFORMATION MANAGEMENT | Facility: OTHER | Age: 43
End: 2023-04-06
Payer: COMMERCIAL

## 2023-05-06 DIAGNOSIS — J45.41 MODERATE PERSISTENT ASTHMA WITH ACUTE EXACERBATION: ICD-10-CM

## 2023-05-31 NOTE — ED NOTES
Back from CT   Scribe Attestation (For Scribes USE Only)... Attending Attestation (For Attendings USE Only).../Scribe Attestation (For Scribes USE Only)...

## 2023-06-14 ENCOUNTER — HOSPITAL ENCOUNTER (OUTPATIENT)
Facility: MEDICAL CENTER | Age: 43
End: 2023-06-14
Attending: STUDENT IN AN ORGANIZED HEALTH CARE EDUCATION/TRAINING PROGRAM
Payer: COMMERCIAL

## 2023-06-14 ENCOUNTER — OFFICE VISIT (OUTPATIENT)
Dept: URGENT CARE | Facility: PHYSICIAN GROUP | Age: 43
End: 2023-06-14
Payer: COMMERCIAL

## 2023-06-14 VITALS
SYSTOLIC BLOOD PRESSURE: 114 MMHG | WEIGHT: 190 LBS | RESPIRATION RATE: 16 BRPM | OXYGEN SATURATION: 98 % | TEMPERATURE: 97.3 F | BODY MASS INDEX: 32.44 KG/M2 | DIASTOLIC BLOOD PRESSURE: 74 MMHG | HEART RATE: 70 BPM | HEIGHT: 64 IN

## 2023-06-14 DIAGNOSIS — J02.9 PHARYNGITIS, UNSPECIFIED ETIOLOGY: ICD-10-CM

## 2023-06-14 DIAGNOSIS — L24.5 IRRITANT CONTACT DERMATITIS DUE TO OTHER CHEMICAL PRODUCTS: ICD-10-CM

## 2023-06-14 DIAGNOSIS — K12.1 ORAL ULCER: ICD-10-CM

## 2023-06-14 LAB — S PYO DNA SPEC NAA+PROBE: NOT DETECTED

## 2023-06-14 PROCEDURE — 87651 STREP A DNA AMP PROBE: CPT | Performed by: STUDENT IN AN ORGANIZED HEALTH CARE EDUCATION/TRAINING PROGRAM

## 2023-06-14 PROCEDURE — 99214 OFFICE O/P EST MOD 30 MIN: CPT | Performed by: STUDENT IN AN ORGANIZED HEALTH CARE EDUCATION/TRAINING PROGRAM

## 2023-06-14 PROCEDURE — 3074F SYST BP LT 130 MM HG: CPT | Performed by: STUDENT IN AN ORGANIZED HEALTH CARE EDUCATION/TRAINING PROGRAM

## 2023-06-14 PROCEDURE — 3078F DIAST BP <80 MM HG: CPT | Performed by: STUDENT IN AN ORGANIZED HEALTH CARE EDUCATION/TRAINING PROGRAM

## 2023-06-14 PROCEDURE — 87070 CULTURE OTHR SPECIMN AEROBIC: CPT

## 2023-06-14 RX ORDER — METHYLPREDNISOLONE 4 MG/1
TABLET ORAL
Qty: 21 TABLET | Refills: 0 | Status: SHIPPED | OUTPATIENT
Start: 2023-06-14 | End: 2023-06-27

## 2023-06-14 RX ORDER — DEXAMETHASONE 0.5 MG/5ML
ELIXIR ORAL
Qty: 237 ML | Refills: 0 | Status: SHIPPED | OUTPATIENT
Start: 2023-06-14 | End: 2023-06-27

## 2023-06-14 ASSESSMENT — ENCOUNTER SYMPTOMS
DIARRHEA: 0
CONSTIPATION: 0
NAUSEA: 0
SORE THROAT: 1
FEVER: 0
SHORTNESS OF BREATH: 0
ABDOMINAL PAIN: 0
PALPITATIONS: 0
VOMITING: 0
COUGH: 0
WHEEZING: 0

## 2023-06-14 ASSESSMENT — FIBROSIS 4 INDEX: FIB4 SCORE: 0.58

## 2023-06-14 NOTE — PROGRESS NOTES
"Subjective     Heidi Gaspar is a 43 y.o. female who presents with Other (Pt states she has blisters on back of throat but doesn't feel sick)            Heidi is a 43 y.o. female who presents to urgent care with sore throat and blisters on the back of her throat.  Patient states that over the weekend she was pulling today and weeds in her yard.  Did with her previously sprayed with Colora.  Patient did have skin irritation on her hands after pulling the weeds.  Hours later she states she noticed a splinter in her finger which she pulled out with her teeth.  Patient unsure if there was round up still on her hands which was then transferred to her mouth/throat.  Patient later developed sore throat and what she believes is blisters on the back of her throat.  Patient states symptoms started on Monday and have not improved since onset.  Patient unsure with sore throat was from possible exposure to Colora or if she is getting sick.  No fever/chills.  States she does have pain over her ear.  No swelling of lips, mouth or tongue.  No shortness of breath/wheezing.        Review of Systems   Constitutional:  Negative for fever and malaise/fatigue.   HENT:  Positive for sore throat. Negative for congestion and ear pain.    Respiratory:  Negative for cough, shortness of breath and wheezing.    Cardiovascular:  Negative for chest pain and palpitations.   Gastrointestinal:  Negative for abdominal pain, constipation, diarrhea, nausea and vomiting.   All other systems reviewed and are negative.             Objective     /74 (BP Location: Left arm, Patient Position: Sitting, BP Cuff Size: Adult long)   Pulse 70   Temp 36.3 °C (97.3 °F) (Temporal)   Resp 16   Ht 1.626 m (5' 4\")   Wt 86.2 kg (190 lb)   SpO2 98%   BMI 32.61 kg/m²      Physical Exam  Vitals reviewed.   Constitutional:       General: She is not in acute distress.     Appearance: Normal appearance. She is not ill-appearing, toxic-appearing or " diaphoretic.   HENT:      Head: Normocephalic and atraumatic.      Right Ear: Tympanic membrane, ear canal and external ear normal.      Left Ear: Tympanic membrane, ear canal and external ear normal.      Nose: Nose normal.      Mouth/Throat:      Lips: Pink.      Mouth: Mucous membranes are moist.      Tongue: No lesions.      Palate: Lesions present.      Pharynx: Oropharynx is clear. Posterior oropharyngeal erythema present. No oropharyngeal exudate.        Comments: Pin-point white ulceration (pictured above)  Eyes:      Extraocular Movements: Extraocular movements intact.      Conjunctiva/sclera: Conjunctivae normal.      Pupils: Pupils are equal, round, and reactive to light.   Cardiovascular:      Rate and Rhythm: Normal rate and regular rhythm.   Pulmonary:      Effort: Pulmonary effort is normal.      Breath sounds: Normal breath sounds.   Skin:     General: Skin is warm and dry.   Neurological:      General: No focal deficit present.      Mental Status: She is alert.                             Assessment & Plan        1. Pharyngitis, unspecified etiology  - POCT GROUP A STREP, PCR collected. Results will be available via [facility].  Patient will be contacted of any positive results.  - CULTURE THROAT; Future    2. Oral ulcer  - dexamethasone (DECADRON) 0.5 MG/5ML Elixir; 5 mL swish and spit three to four times daily. It is important to keep the medication in the mouth for five minutes prior to spitting it out. Do not rinse afterward and avoid eating or drinking for 30 minutes.  Dispense: 237 mL; Refill: 0  - methylPREDNISolone (MEDROL DOSEPAK) 4 MG Tablet Therapy Pack; Follow schedule on package instructions.  Dispense: 21 Tablet; Refill: 0  -Has tolerated Medrol Dosepak in the past without any side effects/adverse reactions.    3. Irritant contact dermatitis due to other chemical products  - Prevent future exposure: When working with herbicides like Palisade, it is essential to wear protective clothing,  such as long sleeves, pants, gloves, and goggles, to minimize skin exposure. Follow the instructions provided by the  on the product label regarding the safe use and handling of the weed spray.  - Monitor symptoms: Monitor for any symptoms that may develop, such as coughing, wheezing, chest tightness, or difficulty breathing. Strict ER precautions discussed.    Differential diagnoses, supportive care measures, and indications for immediate follow-up discussed with patient. Pathogenesis of diagnosis discussed including typical length and natural progression.      My total time spent caring for the patient on the day of the encounter was 30 minutes obtaining patient history, physical exam, discussing differential diagnosis, plan of care, supportive care measures, preventative measures, appropriate follow-up and indications for immediate follow-up.This does not include time spent on separately billable procedures/tests.     Instructed to return to urgent care or nearest emergency department if symptoms fail to improve, for any change in condition, further concerns, or new concerning symptoms.    Patient states understanding and agrees with the plan of care and discharge instructions.

## 2023-06-16 LAB
BACTERIA SPEC RESP CULT: NORMAL
SIGNIFICANT IND 70042: NORMAL
SITE SITE: NORMAL
SOURCE SOURCE: NORMAL

## 2023-08-04 RX ORDER — FLUTICASONE PROPIONATE 220 UG/1
AEROSOL, METERED RESPIRATORY (INHALATION)
Qty: 12 G | OUTPATIENT
Start: 2023-08-04

## 2023-08-22 ENCOUNTER — HOSPITAL ENCOUNTER (OUTPATIENT)
Facility: MEDICAL CENTER | Age: 43
End: 2023-08-22
Payer: COMMERCIAL

## 2023-08-22 ENCOUNTER — OFFICE VISIT (OUTPATIENT)
Dept: URGENT CARE | Facility: PHYSICIAN GROUP | Age: 43
End: 2023-08-22
Payer: COMMERCIAL

## 2023-08-22 VITALS
HEIGHT: 64 IN | DIASTOLIC BLOOD PRESSURE: 82 MMHG | RESPIRATION RATE: 20 BRPM | SYSTOLIC BLOOD PRESSURE: 126 MMHG | HEART RATE: 80 BPM | BODY MASS INDEX: 33.29 KG/M2 | OXYGEN SATURATION: 97 % | WEIGHT: 195 LBS | TEMPERATURE: 96.8 F

## 2023-08-22 DIAGNOSIS — R10.13 EPIGASTRIC PAIN: ICD-10-CM

## 2023-08-22 DIAGNOSIS — M70.31 BURSITIS OF RIGHT ELBOW, UNSPECIFIED BURSA: ICD-10-CM

## 2023-08-22 PROCEDURE — 80053 COMPREHEN METABOLIC PANEL: CPT

## 2023-08-22 PROCEDURE — 99212 OFFICE O/P EST SF 10 MIN: CPT | Mod: 25

## 2023-08-22 PROCEDURE — 3079F DIAST BP 80-89 MM HG: CPT

## 2023-08-22 PROCEDURE — 83690 ASSAY OF LIPASE: CPT

## 2023-08-22 PROCEDURE — 85025 COMPLETE CBC W/AUTO DIFF WBC: CPT

## 2023-08-22 PROCEDURE — 99213 OFFICE O/P EST LOW 20 MIN: CPT

## 2023-08-22 PROCEDURE — 3074F SYST BP LT 130 MM HG: CPT

## 2023-08-22 RX ORDER — PREDNISONE 10 MG/1
20 TABLET ORAL DAILY
Qty: 6 TABLET | Refills: 0 | Status: SHIPPED | OUTPATIENT
Start: 2023-08-22 | End: 2023-08-25

## 2023-08-22 ASSESSMENT — FIBROSIS 4 INDEX: FIB4 SCORE: 0.58

## 2023-08-22 ASSESSMENT — ENCOUNTER SYMPTOMS: NAUSEA: 1

## 2023-08-22 NOTE — PROGRESS NOTES
Subjective:   Heidi Gaspar is a very pleasant 43 y.o. female who presents for:    Chief Complaint   Patient presents with    Nausea     Diarrhea, loss of appetite x 1 week     Elbow Pain     Right elbow px, red and has a bump on it        HPI:    The patient presents to the urgent care with complaints of nausea, abdominal pain, and diarrhea.  Symptoms began approximately 8 days ago.  On 8/15, the patient developed nausea, vomiting, diarrhea, and abdominal pain.  While she reports that her symptoms have slightly improved, she continues to have nausea and constant stomach discomfort.  Associated symptoms include a burning sensation, feeling bloated, poor appetite, and loose stools immediately after eating.  The pain is worse after food intake.  The pain does not radiate.  She has not taken any over-the-counter remedies for her symptoms at this time, but has been refraining to minimize symptoms.  Pertinent negatives include no fever, hematochezia, hematochezia, urinary symptoms, recent antibiotic use, recent travel, exposure to contaminated food or water, palpitations, dizziness, tachycardia. Three weeks ago she enjoyed sushi, but reported that her son had the same meal and did not become ill.    She has a past medical history significant for colitis, cholecystectomy, and left ovarian torsion which required emergency intervention.  She feels that her symptoms are not consistent with ovarian torsion, but is very concerned as her abdominal pain has persisted over the past 8 days.    Additionally, the patient reports that she has new onset right sided elbow pain.  She denies any trauma to the area.  3 days prior, she noticed increased swelling, warmth, tenderness to palpation.  The pain is interrupting her sleep at night.  She has taken Advil and soaked the elbow in Epsom salts, which she reports was mildly helpful.        ROS:    Review of Systems   Constitutional:  Positive for malaise/fatigue. Negative for  "chills, diaphoresis, fever and weight loss.   Respiratory:  Negative for cough, hemoptysis, sputum production, shortness of breath and wheezing.    Gastrointestinal:  Positive for abdominal pain, diarrhea, heartburn, nausea and vomiting. Negative for blood in stool, constipation and melena.   Genitourinary:  Negative for dysuria, flank pain, frequency, hematuria and urgency.   Musculoskeletal:         Right elbow pain   All other systems reviewed and are negative.      Medications:      Current Outpatient Medications   Medication Sig    albuterol 108 (90 Base) MCG/ACT Aero Soln inhalation aerosol Inhale 2 Puffs every four hours as needed for Shortness of Breath.    albuterol 108 (90 Base) MCG/ACT Aero Soln inhalation aerosol Inhale 2 Puffs every 6 hours as needed.    albuterol (PROVENTIL) 2.5mg/0.5ml Nebu Soln Take 2.5 mg by nebulization every four hours as needed.    dexamethasone (DECADRON) 0.5 MG/5ML Elixir 5 mL swish and spit three to four times daily. It is important to keep the medication in the mouth for five minutes prior to spitting it out. Do not rinse afterward and avoid eating or drinking for 30 minutes. (Patient not taking: Reported on 8/22/2023)    methylPREDNISolone (MEDROL DOSEPAK) 4 MG Tablet Therapy Pack Follow schedule on package instructions. (Patient not taking: Reported on 8/22/2023)       Allergies:     Allergies   Allergen Reactions    Inapsine [Droperidol] Anaphylaxis    Inapsine [Droperidol] Anaphylaxis    Keflex Rash and Swelling     RASH & SWELLING    Morphine Unspecified     STOPS PEEING after \"A lot.\"      Penicillins Rash and Swelling     RASH & SWELLING       Problem List:     Patient Active Problem List   Diagnosis    S/P ablation of accessory bypass tract    WPW (Marisela-Parkinson-White syndrome)    Cough    Family history of blood clots    Fatigue    Mild intermittent asthma with acute exacerbation    TMJ click    Varicose vein of leg    Torsion of left ovary and ovarian pedicle    " Palpitations    Left posterior fascicular block    Right bundle branch block    Heterozygous factor V Leiden mutation (HCC)    Cruz syndrome    Pain and swelling of right lower leg    Varicose veins of right lower extremity with pain    PVCs (premature ventricular contractions)    Lipoma of torso    Rib pain    Pain of upper abdomen    Acute maxillary sinusitis    Bruising       Surgical History:    Past Surgical History:   Procedure Laterality Date    PELVISCOPY N/A 9/9/2018    Procedure: PELVISCOPY;  Surgeon: Dianne Martinez M.D.;  Location: SURGERY [facility];  Service: Gyn Robotic    OOPHORECTOMY Left 9/9/2018    Procedure: SALPINGOOPHORECTOMY;  Surgeon: Dianne Martinez M.D.;  Location: SURGERY [facility];  Service: Gyn Robotic    REPEAT C SECTION  6/18/2014    Performed by Corinne E Capurro, M.D. at LABOR AND DELIVERY    GYN SURGERY  2007    csection    OTHER  2003    L BREAST LUMPECTOMY    BLANCA BY LAPAROSCOPY      CHOLECYSTECTOMY      OTHER      gallbladder removed    OTHER      cardiac ablations x 6    OTHER CARDIAC SURGERY      6 ablation    PRIMARY C SECTION      ZZZ IMPLANTABLE CARDIOVERTER DEFIBRILLATOR (ICD)         Past Social Hx:     Social History     Socioeconomic History    Marital status:    Tobacco Use    Smoking status: Never    Smokeless tobacco: Never   Vaping Use    Vaping Use: Never used   Substance and Sexual Activity    Alcohol use: No     Alcohol/week: 0.0 oz    Drug use: No    Sexual activity: Yes     Partners: Male     Birth control/protection: Surgical        Past Family Hx:      Family History   Problem Relation Age of Onset    Asthma Mother     Lung Disease Mother         PE    Other Mother         clotting disease    Thyroid Mother     Lung Disease Son         sees pediatric pulmonologist    Asthma Son     Diabetes Father     Thyroid Sister     Diabetes Maternal Aunt     Other Maternal Grandfather         CHF    Diabetes Paternal Aunt     Diabetes Paternal  Grandmother     Psychiatric Illness Paternal Grandmother         alzheimers       Problem list, medications, and allergies reviewed by myself today in Epic.     Objective:     Vitals:    08/22/23 1008   BP: 126/82   Pulse: 80   Resp: 20   Temp: 36 °C (96.8 °F)   SpO2: 97%       Physical Exam  Vitals reviewed.   Constitutional:       General: She is not in acute distress.     Appearance: Normal appearance. She is not ill-appearing, toxic-appearing or diaphoretic.   HENT:      Head: Normocephalic and atraumatic.      Right Ear: Tympanic membrane, ear canal and external ear normal.      Left Ear: Tympanic membrane, ear canal and external ear normal.      Nose: Nose normal.      Mouth/Throat:      Mouth: Mucous membranes are moist.      Pharynx: Oropharynx is clear.   Eyes:      Extraocular Movements: Extraocular movements intact.      Conjunctiva/sclera: Conjunctivae normal.      Pupils: Pupils are equal, round, and reactive to light.   Cardiovascular:      Rate and Rhythm: Normal rate and regular rhythm.      Pulses: Normal pulses.      Heart sounds: Normal heart sounds.   Pulmonary:      Effort: Pulmonary effort is normal.      Breath sounds: Normal breath sounds.   Abdominal:      General: Abdomen is flat. There is no distension.      Palpations: Abdomen is soft.      Tenderness: There is abdominal tenderness in the right upper quadrant and right lower quadrant. There is no right CVA tenderness, left CVA tenderness, guarding or rebound. Negative signs include Montgomery's sign, Rovsing's sign, McBurney's sign, psoas sign and obturator sign.      Hernia: No hernia is present.   Musculoskeletal:      Right elbow: No swelling, deformity, effusion or lacerations. Decreased range of motion. Tenderness present in lateral epicondyle and olecranon process. No radial head or medial epicondyle tenderness.        Arms:       Cervical back: Normal range of motion.      Comments: TTP over lateral epicondyle. Mild swelling. No   effusion, erythema, abscess, deformity, overlying skin changes, =no streatking    Skin:     General: Skin is warm and dry.   Neurological:      General: No focal deficit present.      Mental Status: She is alert and oriented to person, place, and time.   Psychiatric:         Mood and Affect: Mood normal.         Behavior: Behavior normal.         Thought Content: Thought content normal.        Latest Reference Range & Units 08/22/23 11:19 08/22/23 11:30   WBC 4.8 - 10.8 K/uL 7.8    RBC 4.20 - 5.40 M/uL 5.39    Hemoglobin 12.0 - 16.0 g/dL 16.4 (H)    Hematocrit 37.0 - 47.0 % 46.3    MCV 81.4 - 97.8 fL 85.9    MCH 27.0 - 33.0 pg 30.4    MCHC 32.2 - 35.5 g/dL 35.4    RDW 35.9 - 50.0 fL 43.4    Platelet Count 164 - 446 K/uL 284    MPV 9.0 - 12.9 fL 12.0    Neutrophils-Polys 44.00 - 72.00 % 52.60    Neutrophils (Absolute) 1.82 - 7.42 K/uL 4.10    Lymphocytes 22.00 - 41.00 % 32.20    Lymphs (Absolute) 1.00 - 4.80 K/uL 2.51    Monocytes 0.00 - 13.40 % 10.40    Monos (Absolute) 0.00 - 0.85 K/uL 0.81    Eosinophils 0.00 - 6.90 % 3.80    Eos (Absolute) 0.00 - 0.51 K/uL 0.30    Basophils 0.00 - 1.80 % 0.60    Baso (Absolute) 0.00 - 0.12 K/uL 0.05    Immature Granulocytes 0.00 - 0.90 % 0.40    Immature Granulocytes (abs) 0.00 - 0.11 K/uL 0.03    Nucleated RBC 0.00 - 0.20 /100 WBC 0.00    NRBC (Absolute) K/uL 0.00    Sodium 135 - 145 mmol/L  140   Potassium 3.6 - 5.5 mmol/L  4.6   Chloride 96 - 112 mmol/L  105   Co2 20 - 33 mmol/L  22   Anion Gap 7.0 - 16.0   13.0   Glucose 65 - 99 mg/dL  93   Bun 8 - 22 mg/dL  9   Creatinine 0.50 - 1.40 mg/dL  0.53   GFR (CKD-EPI) >60 mL/min/1.73 m 2  116   Calcium 8.5 - 10.5 mg/dL  9.7   Correct Calcium 8.5 - 10.5 mg/dL  9.2   AST(SGOT) 12 - 45 U/L  20   ALT(SGPT) 2 - 50 U/L  22   Alkaline Phosphatase 30 - 99 U/L  86   Total Bilirubin 0.1 - 1.5 mg/dL  1.8 (H)   Albumin 3.2 - 4.9 g/dL  4.6   Total Protein 6.0 - 8.2 g/dL  6.8   Globulin 1.9 - 3.5 g/dL  2.2   A-G Ratio g/dL  2.1   Lipase 11 - 82  U/L  17   (H): Data is abnormally high            Results from POCT:           Assessment/Plan:     Diagnosis and associated orders:     1. Epigastric pain  - CBC WITH DIFFERENTIAL; Future  - Comp Metabolic Panel; Future  - LIPASE; Future  - CT-ABDOMEN-PELVIS W/O; Future    2. Bursitis of right elbow, unspecified bursa  - Prednisone 20 mg        Comments/MDM:     GI Cocktail given with no improvement in symptoms   CBC, CMP, Lipase ordered -mildly elevated bilirubin and Hgb, lipase normal  Abdominal exam without peritoneal signs. No evidence of acute abdomen at this time. Well appearing. Given work up, low suspicion for acute hepatobiliary disease (including acute cholecystitis or cholangitis), acute pancreatitis (neg lipase), PUD (including gastric perforation), acute infectious processes (pneumonia, hepatitis, pyelonephritis), acute appendicitis, ovarian torsion,  bowel obstruction, viscus perforation, or testicular torsion, diverticulitis. Presentation not consistent with other acute, emergent causes of abdominal pain at this time.    Patient will FU with PCP regarding these findings   CT-abdomen-pelvis if symptoms worsen  Discussed supportive care. [place] diet, rest, increased hydration, electrolyte replacement   At the time of physical exam, R elbow is TTP without s/s of abscess, septic arthritis. Likely due to overuse. Start prednisone 20 mg daily x 3 days, Tylenol, RICE therapy. Monitor for resolution of symptoms.         All questions answered. Patient verbalized understanding and is in agreement with this plan of care.     If symptoms are worsening or not improving in 3-5 days, follow-up with PCP or return to UC. Differential diagnosis, natural history, and supportive care discussed. AVS handout given and reviewed with patient. Patient educated on red flags and when to seek treatment back in ED or UC.     I personally reviewed prior external notes and test results pertinent to today's visit.  I have  independently reviewed and interpreted all diagnostics ordered during this urgent care visit.     This dictation has been created using voice recognition software. The accuracy of the dictation is limited by the abilities of the software. I expect there may be some errors of grammar and possibly content. I made every attempt to manually correct the errors within my dictation. However, errors related to voice recognition software may still exist and should be interpreted within the appropriate context.    This note was electronically signed by KALEIGH Timmons

## 2023-08-23 ENCOUNTER — HOSPITAL ENCOUNTER (OUTPATIENT)
Dept: RADIOLOGY | Facility: MEDICAL CENTER | Age: 43
End: 2023-08-23
Attending: NURSE PRACTITIONER
Payer: COMMERCIAL

## 2023-08-23 ENCOUNTER — HOSPITAL ENCOUNTER (EMERGENCY)
Facility: MEDICAL CENTER | Age: 43
End: 2023-08-23
Payer: COMMERCIAL

## 2023-08-23 ENCOUNTER — TELEPHONE (OUTPATIENT)
Dept: URGENT CARE | Facility: CLINIC | Age: 43
End: 2023-08-23
Payer: COMMERCIAL

## 2023-08-23 ENCOUNTER — HOSPITAL ENCOUNTER (OUTPATIENT)
Dept: RADIOLOGY | Facility: MEDICAL CENTER | Age: 43
End: 2023-08-23
Payer: COMMERCIAL

## 2023-08-23 DIAGNOSIS — R10.13 EPIGASTRIC PAIN: ICD-10-CM

## 2023-08-23 DIAGNOSIS — Z12.31 VISIT FOR SCREENING MAMMOGRAM: ICD-10-CM

## 2023-08-23 DIAGNOSIS — A09 TRAVELER'S DIARRHEA: ICD-10-CM

## 2023-08-23 LAB
ALBUMIN SERPL BCP-MCNC: 4.6 G/DL (ref 3.2–4.9)
ALBUMIN/GLOB SERPL: 2.1 G/DL
ALP SERPL-CCNC: 86 U/L (ref 30–99)
ALT SERPL-CCNC: 22 U/L (ref 2–50)
ANION GAP SERPL CALC-SCNC: 13 MMOL/L (ref 7–16)
AST SERPL-CCNC: 20 U/L (ref 12–45)
BASOPHILS # BLD AUTO: 0.6 % (ref 0–1.8)
BASOPHILS # BLD: 0.05 K/UL (ref 0–0.12)
BILIRUB SERPL-MCNC: 1.8 MG/DL (ref 0.1–1.5)
BUN SERPL-MCNC: 9 MG/DL (ref 8–22)
CALCIUM ALBUM COR SERPL-MCNC: 9.2 MG/DL (ref 8.5–10.5)
CALCIUM SERPL-MCNC: 9.7 MG/DL (ref 8.5–10.5)
CHLORIDE SERPL-SCNC: 105 MMOL/L (ref 96–112)
CO2 SERPL-SCNC: 22 MMOL/L (ref 20–33)
CREAT SERPL-MCNC: 0.53 MG/DL (ref 0.5–1.4)
EOSINOPHIL # BLD AUTO: 0.3 K/UL (ref 0–0.51)
EOSINOPHIL NFR BLD: 3.8 % (ref 0–6.9)
ERYTHROCYTE [DISTWIDTH] IN BLOOD BY AUTOMATED COUNT: 43.4 FL (ref 35.9–50)
GFR SERPLBLD CREATININE-BSD FMLA CKD-EPI: 116 ML/MIN/1.73 M 2
GLOBULIN SER CALC-MCNC: 2.2 G/DL (ref 1.9–3.5)
GLUCOSE SERPL-MCNC: 93 MG/DL (ref 65–99)
HCT VFR BLD AUTO: 46.3 % (ref 37–47)
HGB BLD-MCNC: 16.4 G/DL (ref 12–16)
IMM GRANULOCYTES # BLD AUTO: 0.03 K/UL (ref 0–0.11)
IMM GRANULOCYTES NFR BLD AUTO: 0.4 % (ref 0–0.9)
LIPASE SERPL-CCNC: 17 U/L (ref 11–82)
LYMPHOCYTES # BLD AUTO: 2.51 K/UL (ref 1–4.8)
LYMPHOCYTES NFR BLD: 32.2 % (ref 22–41)
MCH RBC QN AUTO: 30.4 PG (ref 27–33)
MCHC RBC AUTO-ENTMCNC: 35.4 G/DL (ref 32.2–35.5)
MCV RBC AUTO: 85.9 FL (ref 81.4–97.8)
MONOCYTES # BLD AUTO: 0.81 K/UL (ref 0–0.85)
MONOCYTES NFR BLD AUTO: 10.4 % (ref 0–13.4)
NEUTROPHILS # BLD AUTO: 4.1 K/UL (ref 1.82–7.42)
NEUTROPHILS NFR BLD: 52.6 % (ref 44–72)
NRBC # BLD AUTO: 0 K/UL
NRBC BLD-RTO: 0 /100 WBC (ref 0–0.2)
PLATELET # BLD AUTO: 284 K/UL (ref 164–446)
PMV BLD AUTO: 12 FL (ref 9–12.9)
POTASSIUM SERPL-SCNC: 4.6 MMOL/L (ref 3.6–5.5)
PROT SERPL-MCNC: 6.8 G/DL (ref 6–8.2)
RBC # BLD AUTO: 5.39 M/UL (ref 4.2–5.4)
SODIUM SERPL-SCNC: 140 MMOL/L (ref 135–145)
WBC # BLD AUTO: 7.8 K/UL (ref 4.8–10.8)

## 2023-08-23 PROCEDURE — 700117 HCHG RX CONTRAST REV CODE 255

## 2023-08-23 PROCEDURE — 77063 BREAST TOMOSYNTHESIS BI: CPT

## 2023-08-23 PROCEDURE — 74176 CT ABD & PELVIS W/O CONTRAST: CPT

## 2023-08-23 RX ORDER — AZITHROMYCIN 250 MG/1
TABLET, FILM COATED ORAL
Qty: 6 TABLET | Refills: 0 | Status: SHIPPED | OUTPATIENT
Start: 2023-08-23 | End: 2023-08-25

## 2023-08-23 RX ADMIN — IOHEXOL 25 ML: 240 INJECTION, SOLUTION INTRATHECAL; INTRAVASCULAR; INTRAVENOUS; ORAL at 18:39

## 2023-08-24 NOTE — TELEPHONE ENCOUNTER
Discussed CT results with patient via telephone.   CT results and lab results reassuring   Patient continues to have abdominal discomfort and diarrhea  No OTC treatment at this time as she was waiting for the results from imaging  Denies fever, chills, significant worsening of abdominal pain  She will follow up with PCP regarding liver opacity, diarrhea, abdominal pain in the next 5-7 days.  Follow-up with GYN regarding right ovarian cyst  I engaged in shared decision making with patient regarding persistent abdominal pain with accompanying diarrhea.   Start azithromycin for infectious/[place] diarrhea  Start pepto bismol 2 caps q 30-60 min, max 16 tab/24 hours for abdominal discomfort.  Advised of darkening of stools and mouth with this product.      Electrolyte replacement, bland diet, advance diet as tolerated.   ED precautions remain in place in the event of symptoms worsening

## 2023-08-25 ENCOUNTER — OFFICE VISIT (OUTPATIENT)
Dept: URGENT CARE | Facility: PHYSICIAN GROUP | Age: 43
End: 2023-08-25
Payer: COMMERCIAL

## 2023-08-25 ENCOUNTER — HOSPITAL ENCOUNTER (OUTPATIENT)
Facility: MEDICAL CENTER | Age: 43
End: 2023-08-25
Attending: PHYSICIAN ASSISTANT
Payer: COMMERCIAL

## 2023-08-25 VITALS
RESPIRATION RATE: 16 BRPM | TEMPERATURE: 97.6 F | WEIGHT: 195 LBS | DIASTOLIC BLOOD PRESSURE: 80 MMHG | SYSTOLIC BLOOD PRESSURE: 122 MMHG | BODY MASS INDEX: 33.29 KG/M2 | HEART RATE: 81 BPM | HEIGHT: 64 IN | OXYGEN SATURATION: 98 %

## 2023-08-25 DIAGNOSIS — L08.9 SOFT TISSUE INFECTION: ICD-10-CM

## 2023-08-25 DIAGNOSIS — L08.9 SKIN PUSTULE: ICD-10-CM

## 2023-08-25 PROCEDURE — 87070 CULTURE OTHR SPECIMN AEROBIC: CPT

## 2023-08-25 PROCEDURE — 3079F DIAST BP 80-89 MM HG: CPT | Performed by: PHYSICIAN ASSISTANT

## 2023-08-25 PROCEDURE — 3074F SYST BP LT 130 MM HG: CPT | Performed by: PHYSICIAN ASSISTANT

## 2023-08-25 PROCEDURE — 99214 OFFICE O/P EST MOD 30 MIN: CPT | Performed by: PHYSICIAN ASSISTANT

## 2023-08-25 PROCEDURE — 87205 SMEAR GRAM STAIN: CPT

## 2023-08-25 RX ORDER — SULFAMETHOXAZOLE AND TRIMETHOPRIM 800; 160 MG/1; MG/1
1 TABLET ORAL 2 TIMES DAILY
Qty: 14 TABLET | Refills: 0 | Status: SHIPPED | OUTPATIENT
Start: 2023-08-25 | End: 2023-08-25

## 2023-08-25 RX ORDER — SULFAMETHOXAZOLE AND TRIMETHOPRIM 800; 160 MG/1; MG/1
1 TABLET ORAL 2 TIMES DAILY
Qty: 14 TABLET | Refills: 0 | Status: SHIPPED | OUTPATIENT
Start: 2023-08-25 | End: 2023-09-01

## 2023-08-25 ASSESSMENT — ENCOUNTER SYMPTOMS
NAUSEA: 0
FEVER: 0
CHILLS: 0
VOMITING: 0

## 2023-08-25 ASSESSMENT — FIBROSIS 4 INDEX: FIB4 SCORE: 0.68

## 2023-08-25 NOTE — PROGRESS NOTES
Subjective:   Heidi Gaspar is a 43 y.o. female who presents for Elbow Pain (Thinks infection. White with puss. Pain X 4 days Right. )        Patient presents with concerns of right elbow pain for the last 4 days.  Initially the area looked red and swollen, but over the last 24 hours patient noticed the formation of a whitish-yellowish pustule.  The swelling and redness have somewhat improved.  She continues to have pain in the area-pain is worse when she hits it or presses on it.  She denies pain of the elbow joint itself denies swelling of the joint.  No red streaking, nausea, vomiting, fevers, chills.  Patient was previously seen in clinic for GI issues and was prescribed prednisone and azithromycin.  She is not currently taking these medications.      Review of Systems   Constitutional:  Negative for chills and fever.   Gastrointestinal:  Negative for nausea and vomiting.       PMH:  has a past medical history of Angina, Arrhythmia, ASTHMA, Bronchitis, Cardiac arrhythmia, Chickenpox, Influenza, Kidney, horseshoe, [place]'s syndrome, Other acquired deformity of toe, Personal history of venous thrombosis and embolism, Pneumonia, PSVT (paroxysmal supraventricular tachycardia) ([facility]), S/P ablation of ventricular arrhythmia, Ramirez-Parkinson-White syndrome, and WPW (Marisela-Parkinson-White syndrome).    She has no past medical history of Backpain or Dialysis.  MEDS:   Current Outpatient Medications:     sulfamethoxazole-trimethoprim (BACTRIM DS) 800-160 MG tablet, Take 1 Tablet by mouth 2 times a day for 7 days., Disp: 14 Tablet, Rfl: 0    azithromycin (ZITHROMAX) 250 MG Tab, Take 2 tablets by mouth on day one. Take one tablet by mouth the remaining days until gone, Disp: 6 Tablet, Rfl: 0    predniSONE (DELTASONE) 10 MG Tab, Take 2 Tablets by mouth every day for 3 days., Disp: 6 Tablet, Rfl: 0    albuterol 108 (90 Base) MCG/ACT Aero Soln inhalation aerosol, Inhale 2 Puffs every four hours as needed for Shortness of  "Breath., Disp: 1 Each, Rfl: 0    albuterol 108 (90 Base) MCG/ACT Aero Soln inhalation aerosol, Inhale 2 Puffs every 6 hours as needed., Disp: , Rfl:     albuterol (PROVENTIL) 2.5mg/0.5ml Nebu Soln, Take 2.5 mg by nebulization every four hours as needed., Disp: , Rfl:   ALLERGIES:   Allergies   Allergen Reactions    Inapsine [Droperidol] Anaphylaxis    Inapsine [Droperidol] Anaphylaxis    Keflex Rash and Swelling     RASH & SWELLING    Morphine Unspecified     STOPS PEEING after \"A lot.\"      Penicillins Rash and Swelling     RASH & SWELLING     SURGHX:   Past Surgical History:   Procedure Laterality Date    PELVISCOPY N/A 9/9/2018    Procedure: PELVISCOPY;  Surgeon: Dianne Martinez M.D.;  Location: SURGERY [facility];  Service: Gyn Robotic    OOPHORECTOMY Left 9/9/2018    Procedure: SALPINGOOPHORECTOMY;  Surgeon: Dianne Martinez M.D.;  Location: SURGERY [facility];  Service: Gyn Robotic    REPEAT C SECTION  6/18/2014    Performed by Corinne E Capurro, M.D. at LABOR AND DELIVERY    GYN SURGERY  2007    csection    OTHER  2003    L BREAST LUMPECTOMY    BLANCA BY LAPAROSCOPY      CHOLECYSTECTOMY      OTHER      gallbladder removed    OTHER      cardiac ablations x 6    OTHER CARDIAC SURGERY      6 ablation    PRIMARY C SECTION      ZZZ IMPLANTABLE CARDIOVERTER DEFIBRILLATOR (ICD)       SOCHX:  reports that she has never smoked. She has never used smokeless tobacco. She reports that she does not drink alcohol and does not use drugs.  FH: Family history was reviewed, no pertinent findings to report   Objective:   /80   Pulse 81   Temp 36.4 °C (97.6 °F) (Temporal)   Resp 16   Ht 1.626 m (5' 4\")   Wt 88.5 kg (195 lb)   SpO2 98%   BMI 33.47 kg/m²   Physical Exam  Vitals reviewed.   Constitutional:       General: She is not in acute distress.     Appearance: Normal appearance. She is well-developed. She is not toxic-appearing.   HENT:      Head: Normocephalic and atraumatic.      Right Ear: External ear " normal.      Left Ear: External ear normal.      Nose: Nose normal.   Cardiovascular:      Rate and Rhythm: Normal rate and regular rhythm.   Pulmonary:      Effort: Pulmonary effort is normal. No respiratory distress.      Breath sounds: No stridor.   Musculoskeletal:      Comments: 1 cm white pustule over the posterior right elbow.  Approximately 1.5 cm of surrounding erythema and mild edema.  Tender to touch.  No bursal edema or erythema.  Nontender to palpation over olecranon process, radial head, medial and lateral epicondyles.  No palpable effusion.  Elbow range of motion within normal limits.  Radial pulse 2+.   Skin:     General: Skin is dry.   Neurological:      Comments: Alert and oriented.    Psychiatric:         Speech: Speech normal.         Behavior: Behavior normal.           Assessment/Plan:   1. Soft tissue infection  - CULTURE WOUND W/ GRAM STAIN; Future  - sulfamethoxazole-trimethoprim (BACTRIM DS) 800-160 MG tablet; Take 1 Tablet by mouth 2 times a day for 7 days.  Dispense: 14 Tablet; Refill: 0    2. Skin pustule  - CULTURE WOUND W/ GRAM STAIN; Future  - sulfamethoxazole-trimethoprim (BACTRIM DS) 800-160 MG tablet; Take 1 Tablet by mouth 2 times a day for 7 days.  Dispense: 14 Tablet; Refill: 0    Area was cleaned with alcohol swab and lesion was unroofed using a 22-gauge needle.  Area recleaned with dilute Hibiclens and dressed with Polysporin and a Band-Aid.  Wound culture pending.     This does not look like septic bursitis or septic arthritis.  Patient started on Bactrim and we reviewed wound care instructions.  I expect symptoms to improve over the next 2 to 3 days and gradually resolve.  Recommend immediate reevaluation with any new or worsening symptoms.  I will contact patient with culture results via MyChart and adjust treatment plan as indicated.   testicles

## 2023-08-26 LAB
GRAM STN SPEC: NORMAL
SIGNIFICANT IND 70042: NORMAL
SITE SITE: NORMAL
SOURCE SOURCE: NORMAL

## 2023-08-28 ENCOUNTER — TELEPHONE (OUTPATIENT)
Dept: URGENT CARE | Facility: CLINIC | Age: 43
End: 2023-08-28
Payer: COMMERCIAL

## 2023-08-28 LAB
BACTERIA WND AEROBE CULT: NORMAL
GRAM STN SPEC: NORMAL
SIGNIFICANT IND 70042: NORMAL
SITE SITE: NORMAL
SOURCE SOURCE: NORMAL

## 2023-08-28 ASSESSMENT — ENCOUNTER SYMPTOMS
CONSTIPATION: 0
FLANK PAIN: 0
WEIGHT LOSS: 0
VOMITING: 1
HEARTBURN: 1
DIARRHEA: 1
CHILLS: 0
SPUTUM PRODUCTION: 0
WHEEZING: 0
ABDOMINAL PAIN: 1
SHORTNESS OF BREATH: 0
FEVER: 0
COUGH: 0
DIAPHORESIS: 0
HEMOPTYSIS: 0
BLOOD IN STOOL: 0

## 2023-08-28 NOTE — RESULT ENCOUNTER NOTE
Jas Gordon,    Your wound culture was negative. However, I still suspect that you have skin infection and recommend that you finish the course of antibiotics.    Kind regards,  Ariel

## 2023-08-28 NOTE — TELEPHONE ENCOUNTER
Spoke with patient via telephone regarding lab results and abdominal pain  Patient would like to proceed with CT-abdomen/pelvis.   Order in system  Will f/u with patient with results

## 2023-08-29 ENCOUNTER — OFFICE VISIT (OUTPATIENT)
Dept: MEDICAL GROUP | Facility: PHYSICIAN GROUP | Age: 43
End: 2023-08-29
Payer: COMMERCIAL

## 2023-08-29 ENCOUNTER — APPOINTMENT (OUTPATIENT)
Dept: MEDICAL GROUP | Facility: PHYSICIAN GROUP | Age: 43
End: 2023-08-29
Payer: COMMERCIAL

## 2023-08-29 VITALS
TEMPERATURE: 97.1 F | BODY MASS INDEX: 34.76 KG/M2 | HEIGHT: 64 IN | OXYGEN SATURATION: 96 % | WEIGHT: 203.6 LBS | DIASTOLIC BLOOD PRESSURE: 60 MMHG | SYSTOLIC BLOOD PRESSURE: 112 MMHG | HEART RATE: 62 BPM

## 2023-08-29 DIAGNOSIS — Z12.12 SCREENING FOR COLORECTAL CANCER: ICD-10-CM

## 2023-08-29 DIAGNOSIS — Z11.59 NEED FOR HEPATITIS C SCREENING TEST: ICD-10-CM

## 2023-08-29 DIAGNOSIS — Z12.11 SCREENING FOR COLORECTAL CANCER: ICD-10-CM

## 2023-08-29 DIAGNOSIS — R19.7 DIARRHEA, UNSPECIFIED TYPE: ICD-10-CM

## 2023-08-29 DIAGNOSIS — Z12.11 SCREENING FOR COLON CANCER: ICD-10-CM

## 2023-08-29 DIAGNOSIS — R10.11 RUQ PAIN: ICD-10-CM

## 2023-08-29 PROCEDURE — 3078F DIAST BP <80 MM HG: CPT

## 2023-08-29 PROCEDURE — 3074F SYST BP LT 130 MM HG: CPT

## 2023-08-29 PROCEDURE — 99214 OFFICE O/P EST MOD 30 MIN: CPT

## 2023-08-29 ASSESSMENT — ENCOUNTER SYMPTOMS
VOMITING: 0
NAUSEA: 0
CHILLS: 0
ABDOMINAL PAIN: 1
COUGH: 0
HEADACHES: 0
MYALGIAS: 0
WEIGHT LOSS: 0
SHORTNESS OF BREATH: 0
BLURRED VISION: 0
CONSTIPATION: 0
DIZZINESS: 0
DIARRHEA: 1
WEAKNESS: 0
FEVER: 0

## 2023-08-29 ASSESSMENT — PATIENT HEALTH QUESTIONNAIRE - PHQ9: CLINICAL INTERPRETATION OF PHQ2 SCORE: 0

## 2023-08-29 ASSESSMENT — FIBROSIS 4 INDEX: FIB4 SCORE: 0.68

## 2023-08-29 NOTE — ASSESSMENT & PLAN NOTE
Undiagnosed condition of uncertain prognosis. No evidence of transaminitis.  However does have slightly elevated bilirubin. Given chronicity of issue and recent CT findings, will place referral to GI and obtain dedicated RUQ US.

## 2023-08-29 NOTE — ASSESSMENT & PLAN NOTE
Undiagnosed condition with uncertain prognosis  -No acute findings per CT scan however given she has pain located on the right side and ovarian cyst, I am strongly urging her to make appointment with GYN ASAP.  -Recommend adequate hydration of at least 64 ounces of fluid daily along with electrolyte replacement.  Recommend fiber such as Metamucil or psyllium for binding purposes  -ED precautions given and known for worsening or progression of condition including any acute abdomen symptoms as discussed  Stool studies and labs as follows-

## 2023-08-29 NOTE — PROGRESS NOTES
"Subjective:     CC: Diarrhea    HPI:   Heidi presents today with    Problem   Diarrhea    Diarrhea has been occurring since Aug 15th.Started with nausea and vomiting for 24 hours which had subsided after one day. Now describes to have vague abominal discomfort and loose and watery stools. Also has vague abdominal complaints of RUQ and RLQ pain. Most recent CT norris show 3.7 cm right ovarian cyst, no appendicitis, hypodensity ill defined on liver and borderline splenomegaly. Had recently taken bacrim for an elbow infection but diarrhea was present before starting bactrim.    -Denies recent travel or sick contacts  -She is going to make an appointment to see GYN     Ruq Pain    Appears to be a chronic issue and continues to have ongoing tenderness across RUQ and occasional radiation to epigastrium and LUQ. Most recent CT does indication possible fatty liver with hypodensity ill defined.          Health Maintenance: Follow with PCP for health maintenance topics    ROS:  Review of Systems   Constitutional:  Negative for chills, fever, malaise/fatigue and weight loss.   Eyes:  Negative for blurred vision.   Respiratory:  Negative for cough and shortness of breath.    Cardiovascular:  Negative for chest pain.   Gastrointestinal:  Positive for abdominal pain and diarrhea. Negative for constipation, nausea and vomiting.   Musculoskeletal:  Negative for myalgias.   Neurological:  Negative for dizziness, weakness and headaches.       Objective:     Exam:  /60 (BP Location: Left arm, Patient Position: Sitting, BP Cuff Size: Adult)   Pulse 62   Temp 36.2 °C (97.1 °F) (Temporal)   Ht 1.626 m (5' 4\")   Wt 92.4 kg (203 lb 9.6 oz)   SpO2 96%   BMI 34.95 kg/m²  Body mass index is 34.95 kg/m².    Physical Exam  Constitutional:       General: She is not in acute distress.     Appearance: Normal appearance. She is not ill-appearing or toxic-appearing.   HENT:      Head: Normocephalic.   Eyes:      Conjunctiva/sclera: " Conjunctivae normal.   Cardiovascular:      Rate and Rhythm: Normal rate and regular rhythm.      Pulses: Normal pulses.      Heart sounds: Normal heart sounds. No murmur heard.  Pulmonary:      Effort: Pulmonary effort is normal. No respiratory distress.      Breath sounds: Normal breath sounds.   Abdominal:      General: Bowel sounds are normal. There is no distension.      Palpations: Abdomen is soft. There is no mass.      Tenderness: There is abdominal tenderness (Right upper quadrant right lower quadrant). There is no guarding or rebound.   Skin:     General: Skin is warm and dry.   Neurological:      General: No focal deficit present.      Mental Status: She is alert and oriented to person, place, and time.   Psychiatric:         Mood and Affect: Mood normal.         Behavior: Behavior normal.         Labs:   Lab Results   Component Value Date/Time    CHOLSTRLTOT 149 07/01/2017 09:04 AM    LDL 98 07/01/2017 09:04 AM    HDL 40 07/01/2017 09:04 AM    TRIGLYCERIDE 53 07/01/2017 09:04 AM       Lab Results   Component Value Date/Time    SODIUM 140 08/22/2023 11:30 AM    POTASSIUM 4.6 08/22/2023 11:30 AM    CHLORIDE 105 08/22/2023 11:30 AM    CO2 22 08/22/2023 11:30 AM    GLUCOSE 93 08/22/2023 11:30 AM    BUN 9 08/22/2023 11:30 AM    CREATININE 0.53 08/22/2023 11:30 AM    CREATININE 0.6 08/11/2008 06:45 AM     Lab Results   Component Value Date/Time    ALKPHOSPHAT 86 08/22/2023 11:30 AM    ASTSGOT 20 08/22/2023 11:30 AM    ALTSGPT 22 08/22/2023 11:30 AM    TBILIRUBIN 1.8 (H) 08/22/2023 11:30 AM         Assessment & Plan: Medical Decision Making     43 y.o. female with the following -     Problem List Items Addressed This Visit       RUQ pain     Undiagnosed condition of uncertain prognosis. No evidence of transaminitis.  However does have slightly elevated bilirubin. Given chronicity of issue and recent CT findings, will place referral to GI and obtain dedicated RUQ US.         Relevant Orders    US-RUQ    Referral  to Gastroenterology    Diarrhea     Undiagnosed condition with uncertain prognosis  -No acute findings per CT scan however given she has pain located on the right side and ovarian cyst, I am strongly urging her to make appointment with GYN ASAP.  -Recommend adequate hydration of at least 64 ounces of fluid daily along with electrolyte replacement.  Recommend fiber such as Metamucil or psyllium for binding purposes  -ED precautions given and known for worsening or progression of condition including any acute abdomen symptoms as discussed  Stool studies and labs as follows-           Relevant Orders    US-RUQ    C Diff by PCR rflx Toxin    H.PYLORI STOOL ANTIGEN    Referral to Gastroenterology    Complete O&P    CULTURE STOOL     Other Visit Diagnoses       Screening for colorectal cancer        Screening for colon cancer        Relevant Orders    Referral to GI for Colonoscopy    Need for hepatitis C screening test        Relevant Orders    HEP C VIRUS ANTIBODY            Differential diagnosis, natural history, supportive care, and indications for immediate follow-up discussed.  Shared decision making approach utilized, and patient is amendable with plan of care.  Patient understands to return to clinic or go to the emergency department if symptoms worsen. All questions and concerns addressed to the best of my knowledge.    Return if symptoms worsen or fail to improve.    Please note that this dictation was created using voice recognition software. I have made every reasonable attempt to correct obvious errors, but I expect that there are errors of grammar and possibly content that I did not discover before finalizing the note.

## 2023-08-30 ENCOUNTER — HOSPITAL ENCOUNTER (OUTPATIENT)
Dept: RADIOLOGY | Facility: MEDICAL CENTER | Age: 43
End: 2023-08-30
Payer: COMMERCIAL

## 2023-08-30 DIAGNOSIS — R19.7 DIARRHEA, UNSPECIFIED TYPE: ICD-10-CM

## 2023-08-30 DIAGNOSIS — R10.11 RUQ PAIN: ICD-10-CM

## 2023-08-30 PROCEDURE — 76705 ECHO EXAM OF ABDOMEN: CPT

## 2023-08-31 DIAGNOSIS — R16.0 LIVER MASS, LEFT LOBE: ICD-10-CM

## 2023-08-31 DIAGNOSIS — R10.11 RUQ PAIN: ICD-10-CM

## 2023-08-31 NOTE — PROGRESS NOTES
Spoke with patient on phone regarding results of US-RUQ. MRI ordered, she will call to schedule. Does report history of claustrophobia but has done well with MRIs in the past if she has certain glasses on and does not feel she needs sedation at this time.

## 2023-09-26 ENCOUNTER — APPOINTMENT (OUTPATIENT)
Dept: RADIOLOGY | Facility: MEDICAL CENTER | Age: 43
End: 2023-09-26
Payer: COMMERCIAL

## 2023-09-26 DIAGNOSIS — R10.11 RUQ PAIN: ICD-10-CM

## 2023-09-26 DIAGNOSIS — R16.0 LIVER MASS, LEFT LOBE: ICD-10-CM

## 2023-09-26 PROCEDURE — 700117 HCHG RX CONTRAST REV CODE 255: Mod: JZ

## 2023-09-26 PROCEDURE — A9581 GADOXETATE DISODIUM INJ: HCPCS | Mod: JZ

## 2023-09-26 PROCEDURE — 74183 MRI ABD W/O CNTR FLWD CNTR: CPT

## 2023-09-26 RX ADMIN — GADOXETATE DISODIUM 10 ML: 181.43 INJECTION, SOLUTION INTRAVENOUS at 14:33

## 2023-11-07 NOTE — PROGRESS NOTES
"Chief Complaint   Patient presents with   • Establish Care     New Patient    • Other     Referrals    • Leg Swelling     R x 7 months getting worse        HISTORY OF THE PRESENT ILLNESS: This is a 37 y.o. female new patient to our practice. This pleasant patient is here today to establish and discuss TMJ, leg swelling.    WPW (Marisela-Parkinson-White syndrome)  Chronic in nature. Stable. States she is doing well at this time. States she sees Dr. Howard. States she has not been seen in 3 years. Will re-establish.    TMJ click  Chronic in nature. States she has severe TMJ pain. States she has clicking and popping of her left TMJ. States she will notice tenderness and swelling at times. States that it is stable at this time. States PT is the only thing that improves pain.     Fatigue  Chronic in nature. States that she is always fatigued. States that she has lost 27 pounds, does have 2 young children which contributes. States she only sleeps 5 hours per night. States her brain doesn't \"turn off\".     Mild intermittent asthma  Chronic in nature. Stable. Uses her rescue inhaler as needed. States she does not have cough, night-time waking, still notices tightness.     Varicose vein of leg  States she started noticing swelling and heaviness in her right leg. States it started about 7 months ago. States that she notices it will start to ache in the afternoon after a long day. States that she will notice aching. There is a large varicosity behind her medial right knee. She does notice some discoloration of her lateral right lower leg, no wounds at this time.      Past Medical History:   Diagnosis Date   • Angina    • ASTHMA    • Bronchitis    • Cardiac arrhythmia    • Chickenpox    • Influenza    • Kidney, horseshoe    • Hubbard's syndrome    • Other acquired deformity of toe    • Personal history of venous thrombosis and embolism     states when she was little she had problems clotting   • Pneumonia    • PSVT (paroxysmal " supraventricular tachycardia) (HCC)    • S/P ablation of ventricular arrhythmia     x6 (most recent was )   • Ramirez-Parkinson-White syndrome    • WPW (Marisela-Parkinson-White syndrome)        Past Surgical History:   Procedure Laterality Date   • REPEAT C SECTION  2014    Performed by Corinne E Capurro, M.D. at [facility]   • GYN SURGERY      csection   • OTHER  2003    L BREAST LUMPECTOMY   • BLANCA BY LAPAROSCOPY     • CHOLECYSTECTOMY     • IMPLANTABLE CARDIOVERTER DEFIBRILLATOR (ICD)     • OTHER      gallbladder removed   • OTHER      cardiac ablations x 6   • OTHER CARDIAC SURGERY      6 ablation   • PRIMARY C SECTION         Family Status   Relation Status   • Mother Alive    obese   • Son Alive   • Father Alive   • Sister Alive   • Sister    • Son Alive   • Maternal Aunt Alive   • Maternal Grandfather    • Paternal Aunt    • Paternal Grandmother      Family History   Problem Relation Age of Onset   • Asthma Mother    • Lung Disease Mother      PE   • Other Mother      clotting disease   • Thyroid Mother    • Lung Disease Son      sees pediatric pulmonologist   • Asthma Son    • Diabetes Father    • Thyroid Sister    • Diabetes Maternal Aunt    • Other Maternal Grandfather      CHF   • Diabetes Paternal Aunt    • Diabetes Paternal Grandmother    • Psychiatry Paternal Grandmother      alzheimers       Social History   Substance Use Topics   • Smoking status: Never Smoker   • Smokeless tobacco: Never Used   • Alcohol use No       Allergies: Inapsine [droperidol]; Keflex; Morphine; and Penicillins    Current Outpatient Prescriptions Ordered in [facility]   Medication Sig Dispense Refill   • albuterol (PROVENTIL) 2.5mg/3ml Nebu Soln solution for nebulization 3 mL by Nebulization route every four hours as needed for Shortness of Breath. 30 Bullet 1   • albuterol 108 (90 Base) MCG/ACT Aero Soln inhalation aerosol Inhale 2 Puffs by mouth every 6 hours as needed. 8.5 g 0     No current  "Epic-ordered facility-administered medications on file.        Review of Systems   Constitutional:  Negative for fever, chills, weight loss and positive malaise/fatigue.   HENT:  Negative for ear pain, nosebleeds, congestion, sore throat and neck pain.    Eyes:  Negative for blurred vision.   Respiratory:  Negative for cough, sputum production, shortness of breath and wheezing.    Cardiovascular:  Negative for chest pain, palpitations, orthopnea and leg swelling.   Gastrointestinal:  Negative for heartburn, nausea, vomiting and abdominal pain.   Genitourinary:  Negative for dysuria, urgency and frequency.   Musculoskeletal:  Negative for myalgias, back pain and joint pain.   Skin:  Negative for rash and itching.   Neurological:  Negative for dizziness, tingling, tremors, sensory change, focal weakness and headaches.   Endo/Heme/Allergies:  Does not bruise/bleed easily.   Psychiatric/Behavioral:  Negative for depression, anxiety, or memory loss.     All other systems reviewed and are negative except as in HPI.    Exam: Blood pressure 102/60, pulse 87, temperature 36.6 °C (97.8 °F), resp. rate 16, height 1.626 m (5' 4\"), weight 77.6 kg (171 lb), last menstrual period 04/24/2018, SpO2 94 %, not currently breastfeeding.  General:  Normal appearing. No distress.  HEENT:  Normocephalic. Eyes conjunctiva clear lids without ptosis, pupils equal and reactive to light accommodation, ears normal shape and contour, canals are clear bilaterally, tympanic membranes are benign, nasal mucosa benign, oropharynx is without erythema, edema or exudates.   Neck:  Supple without JVD or bruit. Thyroid is not enlarged.  Pulmonary:  Clear to ausculation.  Normal effort. No rales, ronchi, or wheezing.  Cardiovascular:  Regular rate and rhythm without murmur. Carotid and radial pulses are intact and equal bilaterally.  Abdomen:  Soft, nontender, nondistended. Normal bowel sounds. Liver and spleen are not palpable  Neurologic:  Grossly " nonfocal  Lymph:  No cervical, supraclavicular or axillary lymph nodes are palpable  Skin:  Warm and dry.  Enlarged vein behind her right knee. Mild dependent edema, spider veins noted on several areas of her right lower leg.  Musculoskeletal:  Normal gait. No extremity cyanosis, clubbing.  Psych:  Normal mood and affect. Alert and oriented x3. Judgment and insight is normal.    PLAN:    1. WPW (Marisela-Parkinson-White syndrome)  Follow-up with cardiology.    2. TMJ click  Continue PT  - REFERRAL TO PHYSICAL THERAPY Reason for Therapy: Eval/Treat/Report    3. Fatigue, unspecified type  - CBC WITH DIFFERENTIAL; Future  - COMP METABOLIC PANEL; Future  - TSH+FREE T4  - VITAMIN B12; Future  - VITAMIN D,25 HYDROXY; Future    4. Mild intermittent asthma without complication  Continue current management.    5. Screening for lipid disorders  - LIPID PROFILE; Future    6. Varicose vein of leg  Discussed options including conservative measures. Patient will try compression stockings and follow up with this provider.    Follow-up in 1 month or sooner as needed. Patient is encouraged to be seen in the emergency room for chest pain, palpitations, shortness of breath, dizziness, severe abdominal pain or other concerning symptoms.    Please note that this dictation was created using voice recognition software. I have made every reasonable attempt to correct obvious errors, but I expect that there are errors of grammar and possibly content that I did not discover before finalizing the note.      Assessment/Plan  1. WPW (Marisela-Parkinson-White syndrome)     2. TMJ click  REFERRAL TO PHYSICAL THERAPY Reason for Therapy: Eval/Treat/Report   3. Fatigue, unspecified type  CBC WITH DIFFERENTIAL    COMP METABOLIC PANEL    TSH+FREE T4    VITAMIN B12    VITAMIN D,25 HYDROXY   4. Mild intermittent asthma without complication     5. Screening for lipid disorders  LIPID PROFILE   6. Varicose vein of leg           I have placed the below orders  and discussed them with an approved delegating provider. The MA is performing the below orders under the direction of Dr. Daly.   Provided with resources to establish outpatient psychiatric care.

## 2023-12-05 NOTE — PATIENT INSTRUCTIONS
Contact Dermatitis  Dermatitis is redness, soreness, and swelling (inflammation) of the skin. Contact dermatitis is a reaction to certain substances that touch the skin. Many different substances can cause contact dermatitis. There are two types of contact dermatitis:  · Irritant contact dermatitis. This type is caused by something that irritates your skin, such as having dry hands from washing them too often with soap. This type does not require previous exposure to the substance for a reaction to occur. This is the most common type.  · Allergic contact dermatitis. This type is caused by a substance that you are allergic to, such as poison ivy. This type occurs when you have been exposed to the substance (allergen) and develop a sensitivity to it. Dermatitis may develop soon after your first exposure to the allergen, or it may not develop until the next time you are exposed and every time thereafter.  What are the causes?  Irritant contact dermatitis is most commonly caused by exposure to:  · Makeup.  · Soaps.  · Detergents.  · Bleaches.  · Acids.  · Metal salts, such as nickel.  Allergic contact dermatitis is most commonly caused by exposure to:  · Poisonous plants.  · Chemicals.  · Jewelry.  · Latex.  · Medicines.  · Preservatives in products, such as clothing.  What increases the risk?  You are more likely to develop this condition if you have:  · A job that exposes you to irritants or allergens.  · Certain medical conditions, such as asthma or eczema.  What are the signs or symptoms?  Symptoms of this condition may occur on your body anywhere the irritant has touched you or is touched by you.  · Symptoms include:  ? Dryness or flaking.  ? Redness.  ? Cracks.  ? Itching.  ? Pain or a burning feeling.  ? Blisters.  ? Drainage of small amounts of blood or clear fluid from skin cracks.  With allergic contact dermatitis, there may also be swelling in areas such as the eyelids, mouth, or genitals.  How is this  Medicare Annual Wellness Visit    Brianne Stevenson is called at home on her phone for Medicare [address] [facility]   Initial Medicare annual wellness visit  Recommendations for Preventive Services Due: see orders and patient instructions/AVS.  Recommended screening schedule for the next 5-10 years is provided to the patient in written form: see Patient Instructions/AVS.     Return in 1 year (on 12/5/2024). Nita Britton is doing well. She was just getting back from shopping today. She stays very active and exercises at least 5 days per week. Patient's complete Health Risk Assessment and screening values have been reviewed and are found in Flowsheets. The following problems were reviewed today and where indicated follow up appointments were made and/or referrals ordered. No Positive Risk Factors identified today. Objective      Patient-Reported Vitals  No data recorded     Recent memory is intact. Words were recalled accurately. Remote memory is not tested at this time due to the VV per phone only. No Known Allergies  Prior to Visit Medications    Medication Sig Taking? Authorizing Provider   citalopram (CELEXA) 20 MG tablet Take 1 tablet by mouth daily Yes Adeola Landa MD   Calcium Carb-Cholecalciferol (CALCIUM 1000 + D) 1000-20 MG-MCG TABS Take 1 tablet by mouth daily Yes Adeola Landa MD   vitamin D (CHOLECALCIFEROL) 25 MCG (1000 UT) TABS tablet Take 1 tablet by mouth daily Yes ProviderAdeola MD       CareTeam (Including outside providers/suppliers regularly involved in providing care):   Patient Care Team:  Krzysztof Berry DO as PCP - General (Pediatrics)  Krzysztof Berry DO as PCP - Empaneled Provider     Reviewed and updated this visit:  Allergies  Meds         Lab work was done at the previous Physician's office per patient. Health Maintenance was reviewed with the patient and updated.      Heriberto Perkins LPN, 69/6/1438, diagnosed?  This condition is diagnosed with a medical history and physical exam.  · A patch skin test may be performed to help determine the cause.  · If the condition is related to your job, you may need to see an occupational medicine specialist.  How is this treated?  This condition is treated by checking for the cause of the reaction and protecting your skin from further contact. Treatment may also include:  · Steroid creams or ointments. Oral steroid medicines may be needed in more severe cases.  · Antibiotic medicines or antibacterial ointments, if a skin infection is present.  · Antihistamine lotion or an antihistamine taken by mouth to ease itching.  · A bandage (dressing).  Follow these instructions at home:  Skin care  · Moisturize your skin as needed.  · Apply cool compresses to the affected areas.  · Try applying baking soda paste to your skin. Stir water into baking soda until it reaches a paste-like consistency.  · Do not scratch your skin, and avoid friction to the affected area.  · Avoid the use of soaps, perfumes, and dyes.  Medicines  · Take or apply over-the-counter and prescription medicines only as told by your health care provider.  · If you were prescribed an antibiotic medicine, take or apply the antibiotic as told by your health care provider. Do not stop using the antibiotic even if your condition improves.  Bathing  · Try taking a bath with:  ? Epsom salts. Follow the instructions on the packaging. You can get these at your local pharmacy or grocery store.  ? Baking soda. Pour a small amount into the bath as directed by your health care provider.  ? Colloidal oatmeal. Follow the instructions on the packaging. You can get this at your local pharmacy or grocery store.  · Bathe less frequently, such as every other day.  · Bathe in lukewarm water. Avoid using hot water.  Bandage care  · If you were given a bandage (dressing), change it as told by your health care provider.  · Wash your hands  with soap and water before and after you change your dressing. If soap and water are not available, use hand .  General instructions  · Avoid the substance that caused your reaction. If you do not know what caused it, keep a journal to try to track what caused it. Write down:  ? What you eat.  ? What cosmetic products you use.  ? What you drink.  ? What you wear in the affected area. This includes jewelry.  · Check the affected areas every day for signs of infection. Check for:  ? More redness, swelling, or pain.  ? More fluid or blood.  ? Warmth.  ? Pus or a bad smell.  · Keep all follow-up visits as told by your health care provider. This is important.  Contact a health care provider if:  · Your condition does not improve with treatment.  · Your condition gets worse.  · You have signs of infection such as swelling, tenderness, redness, soreness, or warmth in the affected area.  · You have a fever.  · You have new symptoms.  Get help right away if:  · You have a severe headache, neck pain, or neck stiffness.  · You vomit.  · You feel very sleepy.  · You notice red streaks coming from the affected area.  · Your bone or joint underneath the affected area becomes painful after the skin has healed.  · The affected area turns darker.  · You have difficulty breathing.  Summary  · Dermatitis is redness, soreness, and swelling (inflammation) of the skin. Contact dermatitis is a reaction to certain substances that touch the skin.  · Symptoms of this condition may occur on your body anywhere the irritant has touched you or is touched by you.  · This condition is treated by figuring out what caused the reaction and protecting your skin from further contact. Treatment may also include medicines and skin care.  · Avoid the substance that caused your reaction. If you do not know what caused it, keep a journal to try to track what caused it.  · Contact a health care provider if your condition gets worse or you have signs  of infection such as swelling, tenderness, redness, soreness, or warmth in the affected area.  This information is not intended to replace advice given to you by your health care provider. Make sure you discuss any questions you have with your health care provider.  Document Released: 12/15/2001 Document Revised: 04/08/2020 Document Reviewed: 07/03/2019  [employer]vier Patient Education © 2020 [employer]

## 2024-01-23 ENCOUNTER — OFFICE VISIT (OUTPATIENT)
Dept: MEDICAL GROUP | Facility: PHYSICIAN GROUP | Age: 44
End: 2024-01-23
Payer: COMMERCIAL

## 2024-01-23 VITALS
WEIGHT: 198.6 LBS | SYSTOLIC BLOOD PRESSURE: 128 MMHG | TEMPERATURE: 98 F | HEIGHT: 64 IN | BODY MASS INDEX: 33.9 KG/M2 | OXYGEN SATURATION: 97 % | DIASTOLIC BLOOD PRESSURE: 80 MMHG | HEART RATE: 76 BPM

## 2024-01-23 DIAGNOSIS — E66.09 CLASS 1 OBESITY DUE TO EXCESS CALORIES WITHOUT SERIOUS COMORBIDITY WITH BODY MASS INDEX (BMI) OF 34.0 TO 34.9 IN ADULT: ICD-10-CM

## 2024-01-23 DIAGNOSIS — Z11.4 ENCOUNTER FOR SCREENING FOR HIV: ICD-10-CM

## 2024-01-23 DIAGNOSIS — R92.30 DENSE BREAST: ICD-10-CM

## 2024-01-23 DIAGNOSIS — Z00.6 RESEARCH STUDY PATIENT: ICD-10-CM

## 2024-01-23 DIAGNOSIS — Z23 NEED FOR VACCINATION: ICD-10-CM

## 2024-01-23 DIAGNOSIS — D68.51 HETEROZYGOUS FACTOR V LEIDEN MUTATION (HCC): ICD-10-CM

## 2024-01-23 DIAGNOSIS — Z12.11 SCREENING FOR COLON CANCER: ICD-10-CM

## 2024-01-23 DIAGNOSIS — Z11.59 NEED FOR HEPATITIS C SCREENING TEST: ICD-10-CM

## 2024-01-23 DIAGNOSIS — R92.2 DENSE BREAST: ICD-10-CM

## 2024-01-23 DIAGNOSIS — Z00.00 WELLNESS EXAMINATION: ICD-10-CM

## 2024-01-23 PROCEDURE — 90686 IIV4 VACC NO PRSV 0.5 ML IM: CPT | Performed by: NURSE PRACTITIONER

## 2024-01-23 PROCEDURE — 3074F SYST BP LT 130 MM HG: CPT | Performed by: NURSE PRACTITIONER

## 2024-01-23 PROCEDURE — 90471 IMMUNIZATION ADMIN: CPT | Performed by: NURSE PRACTITIONER

## 2024-01-23 PROCEDURE — 99214 OFFICE O/P EST MOD 30 MIN: CPT | Mod: 25 | Performed by: NURSE PRACTITIONER

## 2024-01-23 PROCEDURE — 3079F DIAST BP 80-89 MM HG: CPT | Performed by: NURSE PRACTITIONER

## 2024-01-23 ASSESSMENT — FIBROSIS 4 INDEX: FIB4 SCORE: 0.68

## 2024-01-23 ASSESSMENT — PATIENT HEALTH QUESTIONNAIRE - PHQ9: CLINICAL INTERPRETATION OF PHQ2 SCORE: 0

## 2024-01-23 NOTE — PROGRESS NOTES
REASON FOR VISIT: Pre-Op Consultation  Consultation Requested by: Dr. Frias  Procedure date and type: Uterine Cyst removal    History of condition for which surgery is planned:States pain started in August 2023. States completed a vaginal u/s which showed a cyst which did not resolve after 2 months. States in the end of October pain was more severe. States pain continued through the holidays, states in 2021 she had ovarian torsion which caused further concern. States she had increased exercise. States 3 weeks ago she recommended surgery. Patient was required to complete a clearance to determine if she needs to be anticoagulated.    Current chronic conditions: does not have any pertinent problems on file.  Past medical history:  has a past medical history of Angina, Arrhythmia, ASTHMA, Bronchitis, Cardiac arrhythmia, Chickenpox, Influenza, Kidney, horseshoe, Cruz's syndrome, Other acquired deformity of toe, Pneumonia, PSVT (paroxysmal supraventricular tachycardia), S/P ablation of ventricular arrhythmia, Ramirez-Parkinson-White syndrome, and WPW (Marisela-Parkinson-White syndrome).    She has no past medical history of Backpain or Dialysis.. Negative for: CAD, SBE, CVA, TIA, DVT, PE, bleeding requiring transfusion, intubation.  Surgical and anesthetic history:  has a past surgical history that includes zzz implantable cardioverter defibrillator (icd); qian by laparoscopy; other cardiac surgery; gyn surgery (2007); other (2003); other; repeat c section (6/18/2014); primary c section; cholecystectomy; other; pelviscopy (N/A, 9/9/2018); and oophorectomy (Left, 9/9/2018). Prior surgery without complication, bleeding, reaction to anesthetic, prolonged recovery  Habits:   Social History     Tobacco Use    Smoking status: Never    Smokeless tobacco: Never   Vaping Use    Vaping Use: Never used   Substance Use Topics    Alcohol use: No     Alcohol/week: 0.0 oz    Drug use: No     Allergies: Inapsine [droperidol], Inapsine  "[droperidol], Keflex, Morphine, and Penicillins No known allergy to Anesthetic, or Latex.     Current medicines:   Current Outpatient Medications   Medication Sig Dispense Refill    albuterol 108 (90 Base) MCG/ACT Aero Soln inhalation aerosol Inhale 2 Puffs every four hours as needed for Shortness of Breath. 1 Each 0    albuterol (PROVENTIL) 2.5mg/0.5ml Nebu Soln Take 2.5 mg by nebulization every four hours as needed.       No current facility-administered medications for this visit.     Anticoagulant: ASA, NSAIDs    Herbals: Black Cohash, Echinacea, Garlic, Ginger, Ginkgo Biloba, Ginseng, Kava, Tahira’s Wort,  Saw Palmetto, Valerian               Not taking any anticoagulant.     ROS: negative for: CP, SOB, RABAGO, Orthopnea, wheezing, leg edema, polydipsia, polyuria, fevers, chills, sweats, cough, cold, congestion, +abd pain, reflux, black or bloody stools, weight loss/gain.  Functional Status: ambulatory    PHYSICAL EXAMINATION:  VITAL SIGNS: /80 (BP Location: Left arm, Patient Position: Sitting, BP Cuff Size: Adult)   Pulse 76   Temp 36.7 °C (98 °F) (Temporal)   Ht 1.626 m (5' 4\")   Wt 90.1 kg (198 lb 9.6 oz)   SpO2 97%  Body mass index is 34.09 kg/m².  HEENT: EOMI, PERRL. Oropharynx pink, moist. Normal airway. Neck supple, no cervical lymphadenopathy.  LUNGS: CTAB good excursion.   HEART: RRR no murmur.  ABDOMEN: soft, right-sided tenderness, normal BS. No HSM.  LOWER EXTREMITIES: warm and well perfused with no edema.      IMPRESSION:  Diagnoses of Screening for colon cancer, Heterozygous factor V Leiden mutation (HCC), Wellness examination, Encounter for screening for HIV, Need for hepatitis C screening test, Need for vaccination, Dense breast, Research study patient, and Class 1 obesity due to excess calories without serious comorbidity with body mass index (BMI) of 34.0 to 34.9 in adult were pertinent to this visit.  Planned surgery: Uterine cystectomy   High risk medical conditions: negative for " Cardiac, Pulmonary, Bleeding, Poor healing, Debility. Positive Heterozygous Factor V Leiden mutation, no personal history of DVT. +varicose veins.    PLAN:  Chronic medical conditions: Stable and controlled. Continue current medicines.   Avoid drugs that potentiate bleeding as advised by surgeon  Discontinue all herbal supplements 2 weeks prior to surgery.  Need for SBE prophylaxis: no  Due to herterozygous Factor V Leiden mutation patient requires STANDARD VTE prophylaxis for this surgery. Recommend sequential stockings post-op and compression stockings at home. (doi:10.[facility]/van11680360; Factor V Leiden Mutation: ISHAAN Laura, ISHAAN Parks, AINSLEY Bliss., Cy, AJulio E., 2023)  This patient is considered Low risk for cardiopulmonary complications for this planned surgery.    My total time spent caring for the patient on the day of the encounter was 30 minutes.   This does not include time spent on separately billable procedures/tests.     Amarjit Index for assessing perioperative cardiovascular risk [Circulation 1999;100:1047]:   (one point each for * high-risk surgery [ intrathoracic, suprainguinal vascular, intraperitoneal ],* Hx ischemic heart dz, * Hx CHF, *Hx TIA or CVA, *IDDM, *Serum Cr >2.0)   Interpretation of risk: (Complication = MI, Pulm edema, v-fib or cardiac arrest, complete heart block)  Very Low: 0 points = 0.4 % complication. Low: 1 point = 0.9 %, Moderate: 2 points = 6.6 %, High: 3+ points = 11%.

## 2024-01-23 NOTE — Clinical Note
Please print my note, the letter completed today and fax them to Dr. Corinne Capurro's office for surgical clearance. [facility].  Thank you,  JUAREZ Gant

## 2024-01-23 NOTE — LETTER
January 25, 2024        Heidi Gaspar  [address] [facility] [address]      To Whom it May Concern:    Heidi is cleared for the surgical procedure to be performed by Dr. Frias. She requires standard VTE chemoprophylaxis for this procedure. I recommend sequential stockings, early ambulation, and compression stockings at home.    If you have any questions or concerns, please don't hesitate to call.        Sincerely,        NIKITA Bucio.P.R.N.    Electronically Signed

## 2024-01-24 ENCOUNTER — HOSPITAL ENCOUNTER (OUTPATIENT)
Dept: LAB | Facility: MEDICAL CENTER | Age: 44
End: 2024-01-24
Attending: NURSE PRACTITIONER
Payer: COMMERCIAL

## 2024-01-24 DIAGNOSIS — D68.51 HETEROZYGOUS FACTOR V LEIDEN MUTATION (HCC): ICD-10-CM

## 2024-01-24 DIAGNOSIS — Z00.00 WELLNESS EXAMINATION: ICD-10-CM

## 2024-01-24 DIAGNOSIS — Z11.4 ENCOUNTER FOR SCREENING FOR HIV: ICD-10-CM

## 2024-01-24 DIAGNOSIS — Z11.59 NEED FOR HEPATITIS C SCREENING TEST: ICD-10-CM

## 2024-01-24 LAB
ALBUMIN SERPL BCP-MCNC: 4.4 G/DL (ref 3.2–4.9)
ALBUMIN/GLOB SERPL: 1.7 G/DL
ALP SERPL-CCNC: 73 U/L (ref 30–99)
ALT SERPL-CCNC: 25 U/L (ref 2–50)
ANION GAP SERPL CALC-SCNC: 10 MMOL/L (ref 7–16)
APTT PPP: 30 SEC (ref 24.7–36)
AST SERPL-CCNC: 15 U/L (ref 12–45)
BASOPHILS # BLD AUTO: 0.7 % (ref 0–1.8)
BASOPHILS # BLD: 0.05 K/UL (ref 0–0.12)
BILIRUB SERPL-MCNC: 1.7 MG/DL (ref 0.1–1.5)
BUN SERPL-MCNC: 16 MG/DL (ref 8–22)
CALCIUM ALBUM COR SERPL-MCNC: 9 MG/DL (ref 8.5–10.5)
CALCIUM SERPL-MCNC: 9.3 MG/DL (ref 8.5–10.5)
CHLORIDE SERPL-SCNC: 102 MMOL/L (ref 96–112)
CHOLEST SERPL-MCNC: 196 MG/DL (ref 100–199)
CO2 SERPL-SCNC: 25 MMOL/L (ref 20–33)
CREAT SERPL-MCNC: 0.47 MG/DL (ref 0.5–1.4)
EOSINOPHIL # BLD AUTO: 0.27 K/UL (ref 0–0.51)
EOSINOPHIL NFR BLD: 4 % (ref 0–6.9)
ERYTHROCYTE [DISTWIDTH] IN BLOOD BY AUTOMATED COUNT: 42.5 FL (ref 35.9–50)
FASTING STATUS PATIENT QL REPORTED: NORMAL
GFR SERPLBLD CREATININE-BSD FMLA CKD-EPI: 119 ML/MIN/1.73 M 2
GLOBULIN SER CALC-MCNC: 2.6 G/DL (ref 1.9–3.5)
GLUCOSE SERPL-MCNC: 79 MG/DL (ref 65–99)
HCT VFR BLD AUTO: 43.3 % (ref 37–47)
HCV AB SER QL: NORMAL
HDLC SERPL-MCNC: 37 MG/DL
HGB BLD-MCNC: 15.9 G/DL (ref 12–16)
HIV 1+2 AB+HIV1 P24 AG SERPL QL IA: NORMAL
IMM GRANULOCYTES # BLD AUTO: 0.04 K/UL (ref 0–0.11)
IMM GRANULOCYTES NFR BLD AUTO: 0.6 % (ref 0–0.9)
INR PPP: 1.08 (ref 0.87–1.13)
LDLC SERPL CALC-MCNC: 141 MG/DL
LYMPHOCYTES # BLD AUTO: 1.54 K/UL (ref 1–4.8)
LYMPHOCYTES NFR BLD: 22.6 % (ref 22–41)
MCH RBC QN AUTO: 31.5 PG (ref 27–33)
MCHC RBC AUTO-ENTMCNC: 36.7 G/DL (ref 32.2–35.5)
MCV RBC AUTO: 85.7 FL (ref 81.4–97.8)
MONOCYTES # BLD AUTO: 0.77 K/UL (ref 0–0.85)
MONOCYTES NFR BLD AUTO: 11.3 % (ref 0–13.4)
NEUTROPHILS # BLD AUTO: 4.14 K/UL (ref 1.82–7.42)
NEUTROPHILS NFR BLD: 60.8 % (ref 44–72)
NRBC # BLD AUTO: 0 K/UL
NRBC BLD-RTO: 0 /100 WBC (ref 0–0.2)
PLATELET # BLD AUTO: 272 K/UL (ref 164–446)
PMV BLD AUTO: 11.5 FL (ref 9–12.9)
POTASSIUM SERPL-SCNC: 4.9 MMOL/L (ref 3.6–5.5)
PROT SERPL-MCNC: 7 G/DL (ref 6–8.2)
PROTHROMBIN TIME: 14.2 SEC (ref 12–14.6)
RBC # BLD AUTO: 5.05 M/UL (ref 4.2–5.4)
SODIUM SERPL-SCNC: 137 MMOL/L (ref 135–145)
TRIGL SERPL-MCNC: 91 MG/DL (ref 0–149)
WBC # BLD AUTO: 6.8 K/UL (ref 4.8–10.8)

## 2024-01-24 PROCEDURE — 36415 COLL VENOUS BLD VENIPUNCTURE: CPT

## 2024-01-24 PROCEDURE — 85730 THROMBOPLASTIN TIME PARTIAL: CPT

## 2024-01-24 PROCEDURE — 80061 LIPID PANEL: CPT

## 2024-01-24 PROCEDURE — 85610 PROTHROMBIN TIME: CPT

## 2024-01-24 PROCEDURE — 87389 HIV-1 AG W/HIV-1&-2 AB AG IA: CPT

## 2024-01-24 PROCEDURE — 86803 HEPATITIS C AB TEST: CPT

## 2024-01-24 PROCEDURE — 85025 COMPLETE CBC W/AUTO DIFF WBC: CPT

## 2024-01-24 PROCEDURE — 80053 COMPREHEN METABOLIC PANEL: CPT

## 2024-01-25 ENCOUNTER — TELEPHONE (OUTPATIENT)
Dept: MEDICAL GROUP | Facility: PHYSICIAN GROUP | Age: 44
End: 2024-01-25
Payer: COMMERCIAL

## 2024-01-25 NOTE — TELEPHONE ENCOUNTER
----- Message from KALEIGH Bucio sent at 1/25/2024 10:02 AM PST -----  Please print my note, the letter completed today and fax them to Dr. Corinne Capurro's office for surgical clearance. [facility].    Thank you,    Federico Fritz, JUAREZ

## 2024-01-31 ENCOUNTER — RESEARCH ENCOUNTER (OUTPATIENT)
Dept: RESEARCH | Facility: MEDICAL CENTER | Age: 44
End: 2024-01-31
Payer: COMMERCIAL

## 2024-01-31 NOTE — RESEARCH NOTE
Pushed HNP and ORNELAS consent forms to pt's Shane.    Followed up with patient to let them know and to ask if they have any questions about HNP or the ORNELAS study.

## 2024-02-01 ENCOUNTER — OFFICE VISIT (OUTPATIENT)
Dept: URGENT CARE | Facility: PHYSICIAN GROUP | Age: 44
End: 2024-02-01
Payer: COMMERCIAL

## 2024-02-01 ENCOUNTER — HOSPITAL ENCOUNTER (OUTPATIENT)
Dept: RADIOLOGY | Facility: MEDICAL CENTER | Age: 44
End: 2024-02-01
Attending: PHYSICIAN ASSISTANT
Payer: COMMERCIAL

## 2024-02-01 ENCOUNTER — HOSPITAL ENCOUNTER (OUTPATIENT)
Facility: MEDICAL CENTER | Age: 44
End: 2024-02-01
Attending: PHYSICIAN ASSISTANT
Payer: COMMERCIAL

## 2024-02-01 VITALS
DIASTOLIC BLOOD PRESSURE: 68 MMHG | RESPIRATION RATE: 16 BRPM | WEIGHT: 194.89 LBS | TEMPERATURE: 97.3 F | HEART RATE: 73 BPM | SYSTOLIC BLOOD PRESSURE: 104 MMHG | BODY MASS INDEX: 33.27 KG/M2 | OXYGEN SATURATION: 97 % | HEIGHT: 64 IN

## 2024-02-01 DIAGNOSIS — R39.15 URINARY URGENCY: ICD-10-CM

## 2024-02-01 DIAGNOSIS — R10.2 PELVIC PAIN: ICD-10-CM

## 2024-02-01 DIAGNOSIS — R39.9 LOWER URINARY TRACT SYMPTOMS (LUTS): ICD-10-CM

## 2024-02-01 DIAGNOSIS — Z87.42 HISTORY OF OVARIAN CYST: ICD-10-CM

## 2024-02-01 DIAGNOSIS — R10.2 PELVIC PAIN: Primary | ICD-10-CM

## 2024-02-01 LAB
APPEARANCE UR: CLEAR
BILIRUB UR STRIP-MCNC: NEGATIVE MG/DL
COLOR UR AUTO: NORMAL
GLUCOSE UR STRIP.AUTO-MCNC: NEGATIVE MG/DL
KETONES UR STRIP.AUTO-MCNC: NEGATIVE MG/DL
LEUKOCYTE ESTERASE UR QL STRIP.AUTO: NEGATIVE
NITRITE UR QL STRIP.AUTO: NEGATIVE
PH UR STRIP.AUTO: 6 [PH] (ref 5–8)
POCT INT CON NEG: NEGATIVE
POCT INT CON POS: POSITIVE
POCT URINE PREGNANCY TEST: NEGATIVE
PROT UR QL STRIP: NEGATIVE MG/DL
RBC UR QL AUTO: NEGATIVE
SP GR UR STRIP.AUTO: 1.02
UROBILINOGEN UR STRIP-MCNC: NORMAL MG/DL

## 2024-02-01 PROCEDURE — 81025 URINE PREGNANCY TEST: CPT | Performed by: PHYSICIAN ASSISTANT

## 2024-02-01 PROCEDURE — 3078F DIAST BP <80 MM HG: CPT | Performed by: PHYSICIAN ASSISTANT

## 2024-02-01 PROCEDURE — 76830 TRANSVAGINAL US NON-OB: CPT

## 2024-02-01 PROCEDURE — 81002 URINALYSIS NONAUTO W/O SCOPE: CPT | Performed by: PHYSICIAN ASSISTANT

## 2024-02-01 PROCEDURE — 87086 URINE CULTURE/COLONY COUNT: CPT

## 2024-02-01 PROCEDURE — 99214 OFFICE O/P EST MOD 30 MIN: CPT | Performed by: PHYSICIAN ASSISTANT

## 2024-02-01 PROCEDURE — 3074F SYST BP LT 130 MM HG: CPT | Performed by: PHYSICIAN ASSISTANT

## 2024-02-01 RX ORDER — NITROFURANTOIN 25; 75 MG/1; MG/1
100 CAPSULE ORAL 2 TIMES DAILY
Qty: 14 CAPSULE | Refills: 0 | Status: CANCELLED | OUTPATIENT
Start: 2024-02-01 | End: 2024-02-08

## 2024-02-01 ASSESSMENT — ENCOUNTER SYMPTOMS
VOMITING: 0
FEVER: 0
FLANK PAIN: 1
ANOREXIA: 0
NAUSEA: 0

## 2024-02-01 ASSESSMENT — FIBROSIS 4 INDEX: FIB4 SCORE: 0.5

## 2024-02-01 NOTE — PROGRESS NOTES
"Subjective     Heidi Gaspar is a 43 y.o. female who presents with UTI (Having pain, lower right abdomen, lower back pain, she does have an ovarian/urian cyst, harder to going to the bathroom, hope its not a bladder infection )    PMH:  has a past medical history of Angina, Arrhythmia, ASTHMA, Bronchitis, Cardiac arrhythmia, Chickenpox, Influenza, Kidney, horseshoe, [place]'s syndrome, Other acquired deformity of toe, Pneumonia, PSVT (paroxysmal supraventricular tachycardia), S/P ablation of ventricular arrhythmia, Ramirez-Parkinson-White syndrome, and WPW (Marisela-Parkinson-White syndrome).    She has no past medical history of Backpain or Dialysis.  MEDS:   Current Outpatient Medications:     albuterol 108 (90 Base) MCG/ACT Aero Soln inhalation aerosol, Inhale 2 Puffs every four hours as needed for Shortness of Breath., Disp: 1 Each, Rfl: 0    albuterol (PROVENTIL) 2.5mg/0.5ml Nebu Soln, Take 2.5 mg by nebulization every four hours as needed., Disp: , Rfl:   ALLERGIES:   Allergies   Allergen Reactions    Inapsine [Droperidol] Anaphylaxis    Inapsine [Droperidol] Anaphylaxis    Keflex Rash and Swelling     RASH & SWELLING    Morphine Unspecified     STOPS PEEING after \"A lot.\"      Penicillins Rash and Swelling     RASH & SWELLING     SURGHX:   Past Surgical History:   Procedure Laterality Date    PELVISCOPY N/A 9/9/2018    Procedure: PELVISCOPY;  Surgeon: Dianne Martinez M.D.;  Location: SURGERY [facility];  Service: Gyn Robotic    OOPHORECTOMY Left 9/9/2018    Procedure: SALPINGOOPHORECTOMY;  Surgeon: Dianne Martinez M.D.;  Location: SURGERY [facility];  Service: Gyn Robotic    REPEAT C SECTION  6/18/2014    Performed by Corinne E Capurro, M.D. at [facility]    GYN SURGERY  2007    csection    OTHER  2003    L BREAST LUMPECTOMY    BLANCA BY LAPAROSCOPY      CHOLECYSTECTOMY      OTHER      gallbladder removed    OTHER      cardiac ablations x 6    OTHER CARDIAC SURGERY      6 ablation    " "PRIMARY C SECTION      ZZZ IMPLANTABLE CARDIOVERTER DEFIBRILLATOR (ICD)       SOCHX:  reports that she has never smoked. She has never used smokeless tobacco. She reports that she does not drink alcohol and does not use drugs.  FH: Reviewed with patient, not pertinent to this visit.           Patient presents with:  Lower abdominal pain. PT co pain, lower right abdomen, lower back pain that has gotten progressively worse over the course of the last few days.  PT states she does have an ovarian/urian cyst, harder to going to the bathroom, concerned about UTI and/or ovarian cyst.  PT is waiting to be scheduled for surgical treatment of cyst in the next few days.    Denies fever chills, n/v/d/ or constipation, no vaginal bleeding.  PT has had ovarian torsion of left ovary, feels different but still concerned.        UTI  This is a new problem. The current episode started in the past 7 days. The problem occurs constantly. The problem has been gradually worsening. Associated symptoms include urinary symptoms. Pertinent negatives include no anorexia, fever, nausea or vomiting. The symptoms are aggravated by exertion, walking and standing. She has tried acetaminophen, NSAIDs, position changes and rest for the symptoms. The treatment provided no relief.       Review of Systems   Constitutional:  Negative for fever.   Gastrointestinal:  Negative for anorexia, nausea and vomiting.   Genitourinary:  Positive for flank pain. Negative for dysuria, frequency, hematuria and urgency.        Pressure to go   All other systems reviewed and are negative.             Objective     /68 (BP Location: Left arm, Patient Position: Sitting, BP Cuff Size: Adult)   Pulse 73   Temp 36.3 °C (97.3 °F) (Temporal)   Resp 16   Ht 1.626 m (5' 4\")   Wt 88.4 kg (194 lb 14.2 oz)   SpO2 97%   BMI 33.45 kg/m²      Physical Exam  Vitals and nursing note reviewed.   Constitutional:       General: She is not in acute distress.     Appearance: " Normal appearance. She is well-developed and normal weight. She is not toxic-appearing.   HENT:      Head: Normocephalic and atraumatic.      Nose: Nose normal.      Mouth/Throat:      Mouth: Mucous membranes are moist.   Eyes:      Extraocular Movements: Extraocular movements intact.      Conjunctiva/sclera: Conjunctivae normal.      Pupils: Pupils are equal, round, and reactive to light.   Cardiovascular:      Rate and Rhythm: Normal rate and regular rhythm.      Pulses: Normal pulses.      Heart sounds: Normal heart sounds.   Pulmonary:      Effort: Pulmonary effort is normal.      Breath sounds: Normal breath sounds.   Abdominal:      General: Bowel sounds are normal.      Palpations: Abdomen is soft.      Tenderness: There is abdominal tenderness. There is right CVA tenderness. There is no guarding or rebound.   Musculoskeletal:         General: Normal range of motion.      Cervical back: Normal range of motion and neck supple.   Skin:     General: Skin is warm and dry.      Capillary Refill: Capillary refill takes less than 2 seconds.   Neurological:      General: No focal deficit present.      Mental Status: She is alert and oriented to person, place, and time.      Gait: Gait normal.   Psychiatric:         Mood and Affect: Mood normal.         Behavior: Behavior is cooperative.                             Assessment & Plan                1. Pelvic pain  POCT Urinalysis    POCT Pregnancy    URINE CULTURE(NEW)      2. History of ovarian cyst  POCT Urinalysis    POCT Pregnancy    URINE CULTURE(NEW)      3. Lower urinary tract symptoms (LUTS)  US-PELVIC COMPLETE (TRANSABDOMINAL/TRANSVAGINAL) (COMBO)      4. Urinary urgency  US-PELVIC COMPLETE (TRANSABDOMINAL/TRANSVAGINAL) (COMBO)          UA: neg  PReg: neg    Patient HPI physical exam DDx is concerning for worsening ovarian cyst, possible ovarian torsion, possible appendicitis, possible renal stone.    I have ordered ultrasound outpatient, she will get this  completed at 5 PM today.    I will call patient with ultrasound results.    I have encouraged patient to contact her GYN who she is waiting for scheduling for ovarian cyst surgery which should be scheduled in the next day or 2.  I would like patient's GYN to be aware that she is in the urgent care for this complaint.  Patient states she would call today.    I did encourage patient to be seen in the emergency department if she has any worsening of her symptoms.  Patient verbalized understanding and agreement with this treatment plan.    Differential diagnosis, supportive care, and indications for immediate follow-up discussed with patient.  Instructed to return to clinic or nearest emergency department for any change in condition, further concerns, or worsening of symptoms.    I personally reviewed prior external notes and test results pertinent to today's visit.  I have independently reviewed and interpreted all diagnostics ordered during this urgent care visit.    PT should follow up with PCP in 1-2 days for re-evaluation if symptoms have not improved.      Discussed red flags and reasons to return to UC or ED.      Pt and/or family verbalized understanding of diagnosis and follow up instructions and was offered informational handout on diagnosis.  PT discharged.     Please note that this dictation was created using voice recognition software. I have made every reasonable attempt to correct obvious errors, but I expect that there may be errors of grammar and possibly content that I did not discover before finalizing the note.     My total time spent caring for the patient on the day of the encounter was 37 minutes.   This does not include time spent on separately billable procedures/tests.

## 2024-02-03 LAB
BACTERIA UR CULT: NORMAL
SIGNIFICANT IND 70042: NORMAL
SITE SITE: NORMAL
SOURCE SOURCE: NORMAL

## 2024-02-14 ENCOUNTER — TELEPHONE (OUTPATIENT)
Dept: RESEARCH | Facility: MEDICAL CENTER | Age: 44
End: 2024-02-14
Payer: COMMERCIAL

## 2024-05-28 NOTE — ASSESSMENT & PLAN NOTE
Chronic in nature. Stable. States she is doing well at this time. States she sees Dr. Howard. States she has not been seen in 3 years. Will re-establish.   Unknown if ever smoked

## 2024-11-21 ENCOUNTER — OFFICE VISIT (OUTPATIENT)
Dept: URGENT CARE | Facility: PHYSICIAN GROUP | Age: 44
End: 2024-11-21
Payer: COMMERCIAL

## 2024-11-21 VITALS
HEIGHT: 64 IN | RESPIRATION RATE: 16 BRPM | WEIGHT: 199.96 LBS | OXYGEN SATURATION: 97 % | SYSTOLIC BLOOD PRESSURE: 128 MMHG | DIASTOLIC BLOOD PRESSURE: 84 MMHG | BODY MASS INDEX: 34.14 KG/M2 | HEART RATE: 67 BPM | TEMPERATURE: 96.9 F

## 2024-11-21 DIAGNOSIS — R09.81 SINUS CONGESTION: ICD-10-CM

## 2024-11-21 DIAGNOSIS — H92.03 ACUTE OTALGIA, BILATERAL: ICD-10-CM

## 2024-11-21 DIAGNOSIS — R05.1 ACUTE COUGH: ICD-10-CM

## 2024-11-21 DIAGNOSIS — H66.92 INFECTIVE LEFT OTITIS MEDIA: ICD-10-CM

## 2024-11-21 DIAGNOSIS — J02.9 SORE THROAT: ICD-10-CM

## 2024-11-21 PROCEDURE — 3074F SYST BP LT 130 MM HG: CPT | Performed by: NURSE PRACTITIONER

## 2024-11-21 PROCEDURE — 99214 OFFICE O/P EST MOD 30 MIN: CPT | Performed by: NURSE PRACTITIONER

## 2024-11-21 PROCEDURE — 3079F DIAST BP 80-89 MM HG: CPT | Performed by: NURSE PRACTITIONER

## 2024-11-21 RX ORDER — DOXYCYCLINE HYCLATE 100 MG
100 TABLET ORAL 2 TIMES DAILY
Qty: 14 TABLET | Refills: 0 | Status: SHIPPED | OUTPATIENT
Start: 2024-11-21 | End: 2024-11-28

## 2024-11-21 ASSESSMENT — FIBROSIS 4 INDEX: FIB4 SCORE: 0.51

## 2024-11-21 NOTE — PROGRESS NOTES
"Subjective:   Heidi Gaspar is a 44 y.o. female who presents for Otalgia (Previous ear infection, sinus congestion, sore throat and feels sensitive and swollen. / )       HPI  Patient is a pleasant 44 y.o. who presents for evaluation of 2-week history of bilateral otalgia, sore throat, sinus congestion, postnasal drip, and swollen lymph nodes.  Patient has been taking had a another alternative medications without noticing any relief of symptoms.  States that her children are sick as well with similar symptoms.    ROS  All other systems are negative except as documented above within [facility].    MEDS:   Current Outpatient Medications:     albuterol 108 (90 Base) MCG/ACT Aero Soln inhalation aerosol, Inhale 2 Puffs every four hours as needed for Shortness of Breath., Disp: 1 Each, Rfl: 0    albuterol (PROVENTIL) 2.5mg/0.5ml Nebu Soln, Take 2.5 mg by nebulization every four hours as needed., Disp: , Rfl:   ALLERGIES:   Allergies   Allergen Reactions    Inapsine [Droperidol] Anaphylaxis    Inapsine [Droperidol] Anaphylaxis    Keflex Rash and Swelling     RASH & SWELLING    Morphine Unspecified     STOPS PEEING after \"A lot.\"      Penicillins Rash and Swelling     RASH & SWELLING       Patient's PMH, SocHx, SurgHx, FamHx, Drug allergies and medications were reviewed.     Objective:   /84 (BP Location: Right arm, Patient Position: Sitting, BP Cuff Size: Adult)   Pulse 67   Temp 36.1 °C (96.9 °F) (Temporal)   Resp 16   Ht 1.626 m (5' 4\")   Wt 90.7 kg (199 lb 15.3 oz)   SpO2 97%   BMI 34.32 kg/m²     Physical Exam  Vitals and nursing note reviewed.   Constitutional:       General: She is awake.      Appearance: Normal appearance. She is well-developed.   HENT:      Head: Normocephalic and atraumatic.      Right Ear: Tympanic membrane, ear canal and external ear normal.      Left Ear: Ear canal and external ear normal. Tympanic membrane is erythematous and bulging.      Nose: Nose normal. No nasal tenderness, " mucosal edema, congestion or rhinorrhea.      Right Turbinates: Enlarged.      Left Turbinates: Enlarged.      Mouth/Throat:      Lips: Pink.      Mouth: Mucous membranes are moist.      Pharynx: Oropharynx is clear. Uvula midline. Posterior oropharyngeal erythema present.   Eyes:      Extraocular Movements: Extraocular movements intact.      Conjunctiva/sclera: Conjunctivae normal.   Cardiovascular:      Rate and Rhythm: Normal rate and regular rhythm.      Pulses: Normal pulses.      Heart sounds: Normal heart sounds.   Pulmonary:      Effort: Pulmonary effort is normal.      Breath sounds: Normal breath sounds.      Comments: +cough  Musculoskeletal:         General: Normal range of motion.      Cervical back: Normal range of motion and neck supple.   Skin:     General: Skin is warm and dry.   Neurological:      General: No focal deficit present.      Mental Status: She is alert and oriented to person, place, and time.   Psychiatric:         Mood and Affect: Mood normal.         Behavior: Behavior normal. Behavior is cooperative.         Thought Content: Thought content normal.         Judgment: Judgment normal.         Assessment/Plan:   Assessment      1. Infective left otitis media  - doxycycline (VIBRAMYCIN) 100 MG Tab; Take 1 Tablet by mouth 2 times a day for 7 days.  Dispense: 14 Tablet; Refill: 0    2. Sinus congestion    3. Acute otalgia, bilateral    4. Acute cough    5. Sore throat      Vital signs stable at today's acute urgent care visit.  Begin medications as listed.  Noted the patient is unable to take decongestants.      Advised the patient to follow-up with the primary care provider if symptoms persist.  Red flags discussed and indications to immediately call 911 or present to the ED. All questions were encouraged and answered to the patient's satisfaction and understanding, and they agree to the plan of care.     This is an acute problem with uncertain prognosis, medication management and  instructions as well as management options were provided.  I personally reviewed prior external notes and test results pertinent to today and independently reviewed and interpreted all diagnostics, to include POCT testing. Time spent evaluating this patient includes preparing for visit, counseling/education, exam, evaluation, obtaining history, and ordering lab/test/procedures.      Please note that this dictation was created using voice recognition software. I have made a reasonable attempt to correct obvious errors, but I expect that there are errors of grammar and possibly content that I did not discover before finalizing the note.

## 2025-01-08 ENCOUNTER — PATIENT MESSAGE (OUTPATIENT)
Dept: MEDICAL GROUP | Facility: PHYSICIAN GROUP | Age: 45
End: 2025-01-08
Payer: COMMERCIAL

## 2025-01-08 DIAGNOSIS — Z12.31 ENCOUNTER FOR SCREENING MAMMOGRAM FOR MALIGNANT NEOPLASM OF BREAST: ICD-10-CM

## 2025-02-25 ENCOUNTER — OFFICE VISIT (OUTPATIENT)
Dept: URGENT CARE | Facility: PHYSICIAN GROUP | Age: 45
End: 2025-02-25
Payer: COMMERCIAL

## 2025-02-25 ENCOUNTER — HOSPITAL ENCOUNTER (OUTPATIENT)
Facility: MEDICAL CENTER | Age: 45
End: 2025-02-25
Attending: PHYSICIAN ASSISTANT
Payer: COMMERCIAL

## 2025-02-25 VITALS
OXYGEN SATURATION: 98 % | BODY MASS INDEX: 34.15 KG/M2 | HEIGHT: 64 IN | RESPIRATION RATE: 18 BRPM | TEMPERATURE: 96.8 F | DIASTOLIC BLOOD PRESSURE: 78 MMHG | WEIGHT: 200 LBS | HEART RATE: 71 BPM | SYSTOLIC BLOOD PRESSURE: 112 MMHG

## 2025-02-25 DIAGNOSIS — R39.15 URINARY URGENCY: ICD-10-CM

## 2025-02-25 DIAGNOSIS — H65.193 ACUTE MEE (MIDDLE EAR EFFUSION), BILATERAL: ICD-10-CM

## 2025-02-25 LAB
APPEARANCE UR: NORMAL
BILIRUB UR STRIP-MCNC: NEGATIVE MG/DL
CANDIDA DNA VAG QL PROBE+SIG AMP: NEGATIVE
COLOR UR AUTO: YELLOW
G VAGINALIS DNA VAG QL PROBE+SIG AMP: POSITIVE
GLUCOSE UR STRIP.AUTO-MCNC: NEGATIVE MG/DL
KETONES UR STRIP.AUTO-MCNC: NEGATIVE MG/DL
LEUKOCYTE ESTERASE UR QL STRIP.AUTO: NEGATIVE
NITRITE UR QL STRIP.AUTO: NEGATIVE
PH UR STRIP.AUTO: 6 [PH] (ref 5–8)
PROT UR QL STRIP: NEGATIVE MG/DL
RBC UR QL AUTO: NEGATIVE
SP GR UR STRIP.AUTO: 1.01
T VAGINALIS DNA VAG QL PROBE+SIG AMP: NEGATIVE
UROBILINOGEN UR STRIP-MCNC: 0.2 MG/DL

## 2025-02-25 PROCEDURE — 87510 GARDNER VAG DNA DIR PROBE: CPT

## 2025-02-25 PROCEDURE — 87480 CANDIDA DNA DIR PROBE: CPT

## 2025-02-25 PROCEDURE — 87086 URINE CULTURE/COLONY COUNT: CPT

## 2025-02-25 PROCEDURE — 87660 TRICHOMONAS VAGIN DIR PROBE: CPT

## 2025-02-25 PROCEDURE — 99214 OFFICE O/P EST MOD 30 MIN: CPT | Mod: 25 | Performed by: PHYSICIAN ASSISTANT

## 2025-02-25 PROCEDURE — 81002 URINALYSIS NONAUTO W/O SCOPE: CPT | Performed by: PHYSICIAN ASSISTANT

## 2025-02-25 PROCEDURE — 3074F SYST BP LT 130 MM HG: CPT | Performed by: PHYSICIAN ASSISTANT

## 2025-02-25 PROCEDURE — 3078F DIAST BP <80 MM HG: CPT | Performed by: PHYSICIAN ASSISTANT

## 2025-02-25 RX ORDER — PHENAZOPYRIDINE HYDROCHLORIDE 200 MG/1
200 TABLET, FILM COATED ORAL 3 TIMES DAILY PRN
Qty: 6 TABLET | Refills: 0 | Status: SHIPPED | OUTPATIENT
Start: 2025-02-25

## 2025-02-25 ASSESSMENT — ENCOUNTER SYMPTOMS
FLANK PAIN: 0
SWEATS: 0
VOMITING: 0
COUGH: 0
CHILLS: 0

## 2025-02-25 ASSESSMENT — FIBROSIS 4 INDEX: FIB4 SCORE: 0.51

## 2025-02-25 NOTE — PROGRESS NOTES
Subjective:   Heidi Gaspar is a 44 y.o. female who presents today with   Chief Complaint   Patient presents with    Abdominal Pain     Lower abdominal pressure, painful urination, urinary urgency and urinary frequency.    Otalgia     Ear infection in November, has had lingering ear pain since as well as lymph node pressure in neck.       Otalgia   There is pain in both ears. This is a new problem. Episode onset: 4 days. The problem occurs constantly. The problem has been unchanged. There has been no fever. Pertinent negatives include no coughing or vomiting. She has tried nothing for the symptoms. The treatment provided no relief.   Dysuria   This is a new problem. Episode onset: 3 days. The problem occurs every urination. The problem has been unchanged. The quality of the pain is described as burning. There is A history of pyelonephritis. Pertinent negatives include no chills, discharge, flank pain, sweats, urgency or vomiting. She has tried nothing for the symptoms. The treatment provided no relief.     Patient was initially seen in November for a left-sided ear infection that she was treated for with doxycycline.  Has had some lingering ear pain as well as some swollen lymph nodes to that side as well.    PMH:  has a past medical history of Angina, Arrhythmia, ASTHMA, Bronchitis, Cardiac arrhythmia, Chickenpox, Influenza, Kidney, horseshoe, Cruz's syndrome, Other acquired deformity of toe, Pneumonia, PSVT (paroxysmal supraventricular tachycardia) (HCC), S/P ablation of ventricular arrhythmia, Ramirez-Parkinson-White syndrome, and WPW (Marisela-Parkinson-White syndrome).    She has no past medical history of Backpain or Dialysis.  MEDS:   Current Outpatient Medications:     phenazopyridine (PYRIDIUM) 200 MG Tab, Take 1 Tablet by mouth 3 times a day as needed for Moderate Pain., Disp: 6 Tablet, Rfl: 0    albuterol 108 (90 Base) MCG/ACT Aero Soln inhalation aerosol, Inhale 2 Puffs every four hours as needed for  "Shortness of Breath., Disp: 1 Each, Rfl: 0    albuterol (PROVENTIL) 2.5mg/0.5ml Nebu Soln, Take 2.5 mg by nebulization every four hours as needed., Disp: , Rfl:     methylPREDNISolone (MEDROL DOSEPAK) 4 MG Tablet Therapy Pack, Follow schedule on package instructions., Disp: 21 Tablet, Rfl: 0  ALLERGIES:   Allergies   Allergen Reactions    Inapsine [Droperidol] Anaphylaxis    Inapsine [Droperidol] Anaphylaxis    Keflex Rash and Swelling     RASH & SWELLING    Morphine Unspecified     STOPS PEEING after \"A lot.\"      Penicillins Rash and Swelling     RASH & SWELLING     SURGHX:   Past Surgical History:   Procedure Laterality Date    PELVISCOPY N/A 9/9/2018    Procedure: PELVISCOPY;  Surgeon: Dianne Martinez M.D.;  Location: SURGERY [facility];  Service: Gyn Robotic    OOPHORECTOMY Left 9/9/2018    Procedure: SALPINGOOPHORECTOMY;  Surgeon: Dianne Martinez M.D.;  Location: SURGERY [facility];  Service: Gyn Robotic    REPEAT C SECTION  6/18/2014    Performed by Corinne E Capurro, M.D. at [facility] DEL[facility]    GYN SURGERY  2007    csection    OTHER  2003    L BREAST LUMPECTOMY    BLANCA BY LAPAROSCOPY      CHOLECYSTECTOMY      OTHER      gallbladder removed    OTHER      cardiac ablations x 6    OTHER CARDIAC SURGERY      6 ablation    PRIMARY C SECTION      ZZZ IMPLANTABLE CARDIOVERTER DEFIBRILLATOR (ICD)       SOCHX:  reports that she has never smoked. She has never used smokeless tobacco. She reports that she does not drink alcohol and does not use drugs.  FH: Reviewed with patient, not pertinent to this visit.       Review of Systems   Constitutional:  Negative for chills.   HENT:  Positive for ear pain.    Respiratory:  Negative for cough.    Gastrointestinal:  Negative for vomiting.   Genitourinary:  Positive for dysuria. Negative for flank pain and urgency.        Objective:   /78 (BP Location: Right arm, Patient Position: Sitting, BP Cuff Size: Adult long)   Pulse 71   Temp 36 °C (96.8 °F) " "(Temporal)   Resp 18   Ht 1.626 m (5' 4\")   Wt 90.7 kg (200 lb)   SpO2 98%   BMI 34.33 kg/m²   Physical Exam  Vitals and nursing note reviewed.   Constitutional:       General: She is not in acute distress.     Appearance: Normal appearance. She is well-developed. She is not ill-appearing or toxic-appearing.   HENT:      Head: Normocephalic and atraumatic.      Right Ear: Hearing and ear canal normal. A middle ear effusion is present. Tympanic membrane is not erythematous.      Left Ear: Hearing and ear canal normal. A middle ear effusion is present. Tympanic membrane is not erythematous.   Cardiovascular:      Rate and Rhythm: Normal rate and regular rhythm.      Heart sounds: Normal heart sounds.   Pulmonary:      Effort: Pulmonary effort is normal.      Breath sounds: Normal breath sounds.   Abdominal:      Tenderness: There is no right CVA tenderness or left CVA tenderness.   Musculoskeletal:      Comments: Normal movement in all 4 extremities   Skin:     General: Skin is warm and dry.   Neurological:      Mental Status: She is alert.      Coordination: Coordination normal.   Psychiatric:         Mood and Affect: Mood normal.       UA negative    Assessment/Plan:   Assessment    1. Urinary urgency  - POCT Urinalysis  - URINE CULTURE(NEW); Future  - VAGINAL PATHOGENS DNA PANEL; Future  - phenazopyridine (PYRIDIUM) 200 MG Tab; Take 1 Tablet by mouth 3 times a day as needed for Moderate Pain.  Dispense: 6 Tablet; Refill: 0  - UROGENITAL UREAPLASMA/MYCOPLASMA, PCR; Future    2. Acute USHA (middle ear effusion), bilateral    Symptoms and presentation at this time appear consistent with bilateral middle ear effusion/eustachian tube dysfunction.  Discussed use of Flonase and nondrowsy antihistamine over-the-counter per 's instructions.  No indication for urinary tract infection at this time and no need of antibiotics at this point.  Patient has no concerns for STDs at this time.  We will follow-up with " test results and treat accordingly at that time if needed.    Differential diagnosis, natural history, supportive care, and indications for immediate follow-up discussed.   Patient given instructions and understanding of medications and treatment.    If not improving in 3-5 days, F/U with PCP or return to UC if symptoms worsen.    Patient agreeable to plan.      Please note that this dictation was created using voice recognition software. I have made every reasonable attempt to correct obvious errors, but I expect that there are errors of grammar and possibly content that I did not discover before finalizing the note.    Vinh Capps PA-C

## 2025-02-27 ENCOUNTER — TELEPHONE (OUTPATIENT)
Dept: URGENT CARE | Facility: PHYSICIAN GROUP | Age: 45
End: 2025-02-27
Payer: COMMERCIAL

## 2025-02-27 DIAGNOSIS — N76.0 BACTERIAL VAGINOSIS: ICD-10-CM

## 2025-02-27 DIAGNOSIS — B96.89 BACTERIAL VAGINOSIS: ICD-10-CM

## 2025-02-27 LAB
BACTERIA UR CULT: NORMAL
SIGNIFICANT IND 70042: NORMAL
SITE SITE: NORMAL
SOURCE SOURCE: NORMAL

## 2025-02-27 RX ORDER — METRONIDAZOLE 7.5 MG/G
1 GEL VAGINAL
Qty: 5 EACH | Refills: 0 | Status: SHIPPED | OUTPATIENT
Start: 2025-02-27 | End: 2025-03-04

## 2025-02-27 NOTE — PROGRESS NOTES
Patient seen earlier this week called requesting treatment for bacterial vaginosis.  Provider is off at this time.  Happy to cover, and [employer] sent to pharmacy.  Message sent through [employer] for patient.

## 2025-04-04 ENCOUNTER — OFFICE VISIT (OUTPATIENT)
Dept: URGENT CARE | Facility: PHYSICIAN GROUP | Age: 45
End: 2025-04-04
Payer: COMMERCIAL

## 2025-04-04 VITALS
TEMPERATURE: 96.8 F | OXYGEN SATURATION: 97 % | DIASTOLIC BLOOD PRESSURE: 84 MMHG | HEIGHT: 64 IN | HEART RATE: 70 BPM | SYSTOLIC BLOOD PRESSURE: 130 MMHG | BODY MASS INDEX: 36.7 KG/M2 | WEIGHT: 215 LBS | RESPIRATION RATE: 16 BRPM

## 2025-04-04 DIAGNOSIS — H66.003 NON-RECURRENT ACUTE SUPPURATIVE OTITIS MEDIA OF BOTH EARS WITHOUT SPONTANEOUS RUPTURE OF TYMPANIC MEMBRANES: ICD-10-CM

## 2025-04-04 DIAGNOSIS — B37.9 ANTIBIOTIC-INDUCED YEAST INFECTION: ICD-10-CM

## 2025-04-04 DIAGNOSIS — H69.93 DYSFUNCTION OF BOTH EUSTACHIAN TUBES: ICD-10-CM

## 2025-04-04 DIAGNOSIS — T36.95XA ANTIBIOTIC-INDUCED YEAST INFECTION: ICD-10-CM

## 2025-04-04 PROCEDURE — 3075F SYST BP GE 130 - 139MM HG: CPT | Performed by: NURSE PRACTITIONER

## 2025-04-04 PROCEDURE — 99213 OFFICE O/P EST LOW 20 MIN: CPT | Performed by: NURSE PRACTITIONER

## 2025-04-04 PROCEDURE — 3079F DIAST BP 80-89 MM HG: CPT | Performed by: NURSE PRACTITIONER

## 2025-04-04 RX ORDER — FLUCONAZOLE 150 MG/1
TABLET ORAL
Qty: 2 TABLET | Refills: 0 | Status: SHIPPED | OUTPATIENT
Start: 2025-04-04

## 2025-04-04 RX ORDER — PROGESTERONE 100 MG/1
CAPSULE ORAL
COMMUNITY
Start: 2025-03-18

## 2025-04-04 RX ORDER — PREDNISONE 10 MG/1
20 TABLET ORAL DAILY
Qty: 10 TABLET | Refills: 0 | Status: SHIPPED | OUTPATIENT
Start: 2025-04-04 | End: 2025-04-09

## 2025-04-04 RX ORDER — ESTRADIOL 0.04 MG/D
PATCH TRANSDERMAL
COMMUNITY
Start: 2025-03-18

## 2025-04-04 RX ORDER — CLINDAMYCIN HYDROCHLORIDE 300 MG/1
300 CAPSULE ORAL 3 TIMES DAILY
Qty: 21 CAPSULE | Refills: 0 | Status: SHIPPED | OUTPATIENT
Start: 2025-04-04 | End: 2025-04-11

## 2025-04-04 RX ORDER — CETIRIZINE HYDROCHLORIDE 10 MG/1
10 TABLET ORAL DAILY
Qty: 30 TABLET | Refills: 0 | Status: SHIPPED | OUTPATIENT
Start: 2025-04-04

## 2025-04-04 ASSESSMENT — FIBROSIS 4 INDEX: FIB4 SCORE: 0.51

## 2025-04-04 ASSESSMENT — ENCOUNTER SYMPTOMS
COUGH: 0
HEADACHES: 0
DIARRHEA: 0

## 2025-04-04 NOTE — PATIENT INSTRUCTIONS
Most Eustachian tube problems last only a short time and get better on their own. But they can sometimes lead to more serious conditions, such as,  A middle ear infection, A torn eardrum, Hearing loss. Common causes are, Illnesses or conditions that make the Eustachian tubes swollen or inflamed - These include colds, allergies, ear infections, or sinus infections. The sinuses are hollow areas in the bones of the face. Sudden air pressure changes - These can happen when people fly in an airplane, scuba dive, or drive up to the mountains.  Treatment depends on cause of the issue.  Antibiotics are not needed unless there is an ear infection associated with the dysfunction.   Recommendations are over-the-counter nasal spray such as Flonase.  Decongestant can help with stuffy nose symptoms. Antihistamines to help with itching, sneezing and runny nose.  Avoid getting dehydrated be sure to drink plenty of fluids.  If symptoms worsen or he develop a fever please return to clinic for further evaluation.    Otitis Media, Adult    Otitis media is a condition in which the middle ear is red and swollen (inflamed) and full of fluid. The middle ear is the part of the ear that contains bones for hearing as well as air that helps send sounds to the brain. The condition usually goes away on its own.  What are the causes?  This condition is caused by a blockage in the eustachian tube. This tube connects the middle ear to the back of the nose. It normally allows air into the middle ear. The blockage is caused by fluid or swelling. Problems that can cause blockage include:  A cold or infection that affects the nose, mouth, or throat.  Allergies.  An irritant, such as tobacco smoke.  Adenoids that have become large. The adenoids are soft tissue located in the back of the throat, behind the nose and the roof of the mouth.  Growth or swelling in the upper part of the throat, just behind the nose (nasopharynx).  Damage to the ear caused by  a change in pressure. This is called barotrauma.  What increases the risk?  You are more likely to develop this condition if you:  Smoke or are exposed to tobacco smoke.  Have an opening in the roof of your mouth (cleft palate).  Have acid reflux.  Have problems in your body's defense system (immune system).  What are the signs or symptoms?  Symptoms of this condition include:  Ear pain.  Fever.  Problems with hearing.  Being tired.  Fluid leaking from the ear.  Ringing in the ear.  How is this treated?  This condition can go away on its own within 3-5 days. But if the condition is caused by germs (bacteria) and does not go away on its own, or if it keeps coming back, your doctor may:  Give you antibiotic medicines.  Give you medicines for pain.  Follow these instructions at home:  Take over-the-counter and prescription medicines only as told by your doctor.  If you were prescribed an antibiotic medicine, take it as told by your doctor. Do not stop taking it even if you start to feel better.  Keep all follow-up visits.  Contact a doctor if:  You have bleeding from your nose.  There is a lump on your neck.  You are not feeling better in 5 days.  You feel worse instead of better.  Get help right away if:  You have pain that is not helped with medicine.  You have swelling, redness, or pain around your ear.  You get a stiff neck.  You cannot move part of your face (paralysis).  You notice that the bone behind your ear hurts when you touch it.  You get a very bad headache.  Summary  Otitis media means that the middle ear is red, swollen, and full of fluid.  This condition usually goes away on its own.  If the problem does not go away, treatment may be needed. You may be given medicines to treat the infection or to treat your pain.  If you were prescribed an antibiotic medicine, take it as told by your doctor. Do not stop taking it even if you start to feel better.  Keep all follow-up visits.  This information is not  intended to replace advice given to you by your health care provider. Make sure you discuss any questions you have with your health care provider.  Document Revised: 03/28/2022 Document Reviewed: 03/28/2022  Elsevier Patient Education © 2023 [employer]  Eustachian Tube Dysfunction    Eustachian tube dysfunction refers to a condition in which a blockage develops in the narrow passage that connects the middle ear to the back of the nose (eustachian tube). The eustachian tube regulates air pressure in the middle ear by letting air move between the ear and nose. It also helps to drain fluid from the middle ear space.  Eustachian tube dysfunction can affect one or both ears. When the eustachian tube does not function properly, air pressure, fluid, or both can build up in the middle ear.  What are the causes?  This condition occurs when the eustachian tube becomes blocked or cannot open normally. Common causes of this condition include:  Ear infections.  Colds and other infections that affect the nose, mouth, and throat (upper respiratory tract).  Allergies.  Irritation from cigarette smoke.  Irritation from stomach acid coming up into the esophagus (gastroesophageal reflux). The esophagus is the part of the body that moves food from the mouth to the stomach.  Sudden changes in air pressure, such as from descending in an airplane or scuba diving.  Abnormal growths in the nose or throat, such as:  Growths that line the nose (nasal polyps).  Abnormal growth of cells (tumors).  Enlarged tissue at the back of the throat (adenoids).  What increases the risk?  You are more likely to develop this condition if:  You smoke.  You are overweight.  You are a child who has:  Certain birth defects of the mouth, such as cleft palate.  Large tonsils or adenoids.  What are the signs or symptoms?  Common symptoms of this condition include:  A feeling of fullness in the ear.  Ear pain.  Clicking or popping noises in the ear.  Ringing in  "the ear (tinnitus).  Hearing loss.  Loss of balance.  Dizziness.  Symptoms may get worse when the air pressure around you changes, such as when you travel to an area of high elevation, fly on an airplane, or go scuba diving.  How is this diagnosed?  This condition may be diagnosed based on:  Your symptoms.  A physical exam of your ears, nose, and throat.  Tests, such as those that measure:  The movement of your eardrum.  Your hearing (audiometry).  How is this treated?  Treatment depends on the cause and severity of your condition.  In mild cases, you may relieve your symptoms by moving air into your ears. This is called \"popping the ears.\"  In more severe cases, or if you have symptoms of fluid in your ears, treatment may include:  Medicines to relieve congestion (decongestants).  Medicines that treat allergies (antihistamines).  Nasal sprays or ear drops that contain medicines that reduce swelling (steroids).  A procedure to drain the fluid in your eardrum. In this procedure, a small tube may be placed in the eardrum to:  Drain the fluid.  Restore the air in the middle ear space.  A procedure to insert a balloon device through the nose to inflate the opening of the eustachian tube (balloon dilation).  Follow these instructions at home:  Lifestyle  Do not do any of the following until your health care provider approves:  Travel to high altitudes.  Fly in airplanes.  Work in a pressurized cabin or room.  Scuba dive.  Do not use any products that contain nicotine or tobacco. These products include cigarettes, chewing tobacco, and vaping devices, such as e-cigarettes. If you need help quitting, ask your health care provider.  Keep your ears dry. Wear fitted earplugs during showering and bathing. Dry your ears completely after.  General instructions  Take over-the-counter and prescription medicines only as told by your health care provider.  Use techniques to help pop your ears as recommended by your health care " provider. These may include:  Chewing gum.  Yawning.  Frequent, forceful swallowing.  Closing your mouth, holding your nose closed, and gently blowing as if you are trying to blow air out of your nose.  Keep all follow-up visits. This is important.  Contact a health care provider if:  Your symptoms do not go away after treatment.  Your symptoms come back after treatment.  You are unable to pop your ears.  You have:  A fever.  Pain in your ear.  Pain in your head or neck.  Fluid draining from your ear.  Your hearing suddenly changes.  You become very dizzy.  You lose your balance.  Get help right away if:  You have a sudden, severe increase in any of your symptoms.  Summary  Eustachian tube dysfunction refers to a condition in which a blockage develops in the eustachian tube.  It can be caused by ear infections, allergies, inhaled irritants, or abnormal growths in the nose or throat.  Symptoms may include ear pain or fullness, hearing loss, or ringing in the ears.  Mild cases are treated with techniques to unblock the ears, such as yawning or chewing gum.  More severe cases are treated with medicines or procedures.  This information is not intended to replace advice given to you by your health care provider. Make sure you discuss any questions you have with your health care provider.  Document Revised: 02/28/2022 Document Reviewed: 02/28/2022  Elsevier Patient Education © 2023 Elsevier Inc.

## 2025-04-04 NOTE — PROGRESS NOTES
"Patient/parent/guardian has consented to treatment and for use of patient information for treatment and billing purposes.  Subjective:   Heidi Gaspar  is a 44 y.o. female who presents for Otalgia (Right ear, sinus pressure, dizzy/X 2 weeks )       Otalgia   There is pain in both ears. This is a new problem. The current episode started 1 to 4 weeks ago. The problem occurs constantly. The problem has been gradually worsening. There has been no fever. The pain is mild. Pertinent negatives include no coughing, diarrhea, ear discharge or headaches. She has tried nothing for the symptoms. There is no history of a chronic ear infection, hearing loss or a tympanostomy tube.       Review of Systems   HENT:  Positive for ear pain. Negative for ear discharge.    Respiratory:  Negative for cough.    Gastrointestinal:  Negative for diarrhea.   Neurological:  Negative for headaches.         CURRENT MEDICATIONS:  albuterol Aers  albuterol Nebu  estradiol  methylPREDNISolone Tbpk  phenazopyridine Tabs  progesterone Caps  Allergies:     Allergies   Allergen Reactions    Inapsine [Droperidol] Anaphylaxis    Inapsine [Droperidol] Anaphylaxis    Keflex Rash and Swelling     RASH & SWELLING    Morphine Unspecified     STOPS PEEING after \"A lot.\"      Penicillins Rash and Swelling     RASH & SWELLING     Current Problems: Heidi Gaspar does not have any pertinent problems on file.  Past Surgical Hx:    Past Surgical History:   Procedure Laterality Date    PELVISCOPY N/A 9/9/2018    Procedure: PELVISCOPY;  Surgeon: Dianne Martinez M.D.;  Location: SURGERY [facility];  Service: Gyn Robotic    OOPHORECTOMY Left 9/9/2018    Procedure: SALPINGOOPHORECTOMY;  Surgeon: Dianne Martinez M.D.;  Location: SURGERY [facility];  Service: Gyn Robotic    REPEAT C SECTION  6/18/2014    Performed by Corinne E Capurro, M.D. at LABOR [facility] DELIVERY    GYN SURGERY  2007    csection    OTHER  2003    L BREAST LUMPECTOMY    BLANCA BY " "LAPAROSCOPY      CHOLECYSTECTOMY      OTHER      gallbladder removed    OTHER      cardiac ablations x 6    OTHER CARDIAC SURGERY      6 ablation    PRIMARY C SECTION      ZZZ IMPLANTABLE CARDIOVERTER DEFIBRILLATOR (ICD)        Past Social Hx:  reports that she has never smoked. She has never used smokeless tobacco. She reports that she does not drink alcohol and does not use drugs.    Objective:   /84   Pulse 70   Temp 36 °C (96.8 °F) (Temporal)   Resp 16   Ht 1.626 m (5' 4\")   Wt 97.5 kg (215 lb)   SpO2 97%   BMI 36.90 kg/m²   Physical Exam  Vitals and nursing note reviewed.   Constitutional:       General: She is not in acute distress.     Appearance: Normal appearance. She is normal weight. She is ill-appearing.   HENT:      Head: Normocephalic and atraumatic.      Right Ear: External ear normal. Swelling and tenderness present. A middle ear effusion is present. Tympanic membrane is erythematous and bulging.      Left Ear: External ear normal. Swelling and tenderness present. A middle ear effusion is present. Tympanic membrane is erythematous and bulging.      Nose: Nose normal. No mucosal edema, congestion or rhinorrhea.      Right Sinus: No maxillary sinus tenderness or frontal sinus tenderness.      Left Sinus: No maxillary sinus tenderness or frontal sinus tenderness.      Mouth/Throat:      Mouth: Mucous membranes are moist.      Pharynx: No oropharyngeal exudate, posterior oropharyngeal erythema or postnasal drip.   Eyes:      Extraocular Movements: Extraocular movements intact.      Conjunctiva/sclera: Conjunctivae normal.      Pupils: Pupils are equal, round, and reactive to light.   Cardiovascular:      Rate and Rhythm: Normal rate and regular rhythm.      Pulses: Normal pulses.      Heart sounds: Normal heart sounds.   Pulmonary:      Effort: Pulmonary effort is normal.      Breath sounds: Normal breath sounds. No wheezing, rhonchi or rales.   Abdominal:      Palpations: Abdomen is soft. "      Tenderness: There is no abdominal tenderness.   Musculoskeletal:         General: Normal range of motion.      Cervical back: Normal range of motion and neck supple.   Skin:     General: Skin is warm and dry.      Findings: No rash.   Neurological:      General: No focal deficit present.      Mental Status: She is alert and oriented to person, place, and time.   Psychiatric:         Mood and Affect: Mood normal.         Behavior: Behavior normal.       Assessment/Plan:   1. Non-recurrent acute suppurative otitis media of both ears without spontaneous rupture of tympanic membranes  - cetirizine (ZYRTEC ALLERGY) 10 MG Tab; Take 1 Tablet by mouth every day.  Dispense: 30 Tablet; Refill: 0  - predniSONE (DELTASONE) 10 MG Tab; Take 2 Tablets by mouth every day for 5 days.  Dispense: 10 Tablet; Refill: 0  - clindamycin (CLEOCIN) 300 MG Cap; Take 1 Capsule by mouth 3 times a day for 7 days.  Dispense: 21 Capsule; Refill: 0    2. Dysfunction of both eustachian tubes  - cetirizine (ZYRTEC ALLERGY) 10 MG Tab; Take 1 Tablet by mouth every day.  Dispense: 30 Tablet; Refill: 0  - predniSONE (DELTASONE) 10 MG Tab; Take 2 Tablets by mouth every day for 5 days.  Dispense: 10 Tablet; Refill: 0    3. Antibiotic-induced yeast infection  - fluconazole (DIFLUCAN) 150 MG tablet; Take one tablet orally for yeast infection, if symptoms persist, may repeat treatment after 72 hours.  Dispense: 2 Tablet; Refill: 0    Other orders  - estradiol (CLIMARA) 0.0375 MG/24HR patch  - progesterone (PROMETRIUM) 100 MG Cap    HPI exam findings consistent with acute bacterial otitis media with bilateral TM erythematous, bulging, and dull.  Bilateral eustachian tube dysfunction causing symptoms as well.  Clindamycin sent to pharmacy of instructions for use.  Anticipatory guidance and treatment course discussed. Discussed treatment. Recommend Tylenol, and ibuprofen alternating for pain relief.  Recommend non drowsy antihistamine to help prevent  worsening ear pain.    Patient/parent has been warned of side effects of steroids including, hyperglycemia, weight gain, gastritis/dyspepsia (instructed to take with food), and insomnia/manic symptoms. Also instructed to avoid oral NSAID use while taking the medication. Take your steroid with food and a glass of water as this medication may cause stomach upset and heartburn.  If you have diabetes monitor for hyperglycemia and treat appropriately.  This medication may cause some agitation or cause difficulty sleeping.  I recommend taking it early in the day.    Red flags, RTC and ER precautions discussed, with patient confirming their understanding of  need for immediate follow-up.  Discussed medication management options, risks and benefits, and alternatives to treatment plan agreed upon. Instructed to continue  medications without changes as ordered by primary care unless aforementioned above.  Patient expresses understanding and agrees to plan of care. All questions or concerns answered. For my MDM, I have personally reviewed previous notes, and test results as pertinent to today's visit.    Please note that this dictation was created using voice recognition software. I have made every reasonable attempt to correct obvious errors,  but there may be grammar errors, and possibly content that I did not discover before finalizing the note.   This note was electronically signed by KALEIGH Reynoso

## 2025-05-07 ENCOUNTER — OFFICE VISIT (OUTPATIENT)
Dept: URGENT CARE | Facility: PHYSICIAN GROUP | Age: 45
End: 2025-05-07
Payer: COMMERCIAL

## 2025-05-07 VITALS
BODY MASS INDEX: 36.16 KG/M2 | OXYGEN SATURATION: 97 % | HEIGHT: 64 IN | WEIGHT: 211.8 LBS | SYSTOLIC BLOOD PRESSURE: 110 MMHG | RESPIRATION RATE: 16 BRPM | DIASTOLIC BLOOD PRESSURE: 72 MMHG | HEART RATE: 72 BPM | TEMPERATURE: 97.2 F

## 2025-05-07 DIAGNOSIS — H66.90 ACUTE OTITIS MEDIA, UNSPECIFIED OTITIS MEDIA TYPE: ICD-10-CM

## 2025-05-07 PROCEDURE — 3074F SYST BP LT 130 MM HG: CPT | Performed by: PHYSICIAN ASSISTANT

## 2025-05-07 PROCEDURE — 99214 OFFICE O/P EST MOD 30 MIN: CPT | Performed by: PHYSICIAN ASSISTANT

## 2025-05-07 PROCEDURE — 3078F DIAST BP <80 MM HG: CPT | Performed by: PHYSICIAN ASSISTANT

## 2025-05-07 RX ORDER — DOXYCYCLINE HYCLATE 100 MG
100 TABLET ORAL 2 TIMES DAILY
Qty: 14 TABLET | Refills: 0 | Status: SHIPPED | OUTPATIENT
Start: 2025-05-07 | End: 2025-05-14

## 2025-05-07 ASSESSMENT — FIBROSIS 4 INDEX: FIB4 SCORE: 0.51

## 2025-05-09 ASSESSMENT — ENCOUNTER SYMPTOMS
SORE THROAT: 0
NECK PAIN: 0
DIZZINESS: 1
VOMITING: 0
WHEEZING: 0
SPUTUM PRODUCTION: 0
MYALGIAS: 0
COUGH: 0
EYE DISCHARGE: 0
EYE REDNESS: 0
HEADACHES: 0
DIARRHEA: 0
FEVER: 0
CHILLS: 0

## 2025-05-09 NOTE — PROGRESS NOTES
"Subjective     Heidi Gaspar is a 44 y.o. female who presents with Otalgia (X 3-4 days, sinus pressure developed into bilateral ear pain/ mainly R ear, dizziness. )            Patient is a pleasant 44-year-old female presents to urgent care with ear pain worse on the right than the left with sinus pressure for the last 3 to 4 days.  Patient also reports episodes of slight dizziness very consistent with previous otitis media.  Patient reports long history of requiring tubes when she was younger with recurrent ear infections and expresses concern as she has had several ear infections over the last year when otherwise patient was stable for several years.  Denies associated fevers, headache, drainage.    Otalgia   There is pain in both ears. This is a new problem. There has been no fever. Pertinent negatives include no coughing, diarrhea, ear discharge, headaches, neck pain, rash, sore throat or vomiting.       Review of Systems   Constitutional:  Positive for malaise/fatigue. Negative for chills and fever.   HENT:  Positive for congestion and ear pain. Negative for ear discharge and sore throat.    Eyes:  Negative for discharge and redness.   Respiratory:  Negative for cough, sputum production and wheezing.    Cardiovascular:  Negative for chest pain.   Gastrointestinal:  Negative for diarrhea and vomiting.   Genitourinary:  Negative for dysuria and urgency.   Musculoskeletal:  Negative for myalgias and neck pain.   Skin:  Negative for itching and rash.   Neurological:  Positive for dizziness. Negative for headaches.              Objective     /72 (BP Location: Right arm, Patient Position: Sitting, BP Cuff Size: Adult long)   Pulse 72   Temp 36.2 °C (97.2 °F) (Temporal)   Resp 16   Ht 1.626 m (5' 4\")   Wt 96.1 kg (211 lb 12.8 oz)   SpO2 97%   BMI 36.36 kg/m²    PMH:  has a past medical history of Angina, Arrhythmia, ASTHMA, Bronchitis, Cardiac arrhythmia, Chickenpox, Influenza, Kidney, horseshoe, " "[place]'s syndrome, Other acquired deformity of toe, Pneumonia, PSVT (paroxysmal supraventricular tachycardia) ([facility]), S/P ablation of ventricular arrhythmia, Ramirez-Parkinson-White syndrome, and WPW (Marisela-Parkinson-White syndrome).    She has no past medical history of Backpain or Dialysis.  MEDS: Reviewed .   ALLERGIES:   Allergies   Allergen Reactions    Inapsine [Droperidol] Anaphylaxis    Inapsine [Droperidol] Anaphylaxis    Keflex Rash and Swelling     RASH & SWELLING    Morphine Unspecified     STOPS PEEING after \"A lot.\"      Penicillins Rash and Swelling     RASH & SWELLING     SURGHX:   Past Surgical History:   Procedure Laterality Date    PELVISCOPY N/A 9/9/2018    Procedure: PELVISCOPY;  Surgeon: Dianne Martinez M.D.;  Location: SURGERY [facility];  Service: Gyn Robotic    OOPHORECTOMY Left 9/9/2018    Procedure: SALPINGOOPHORECTOMY;  Surgeon: Dianne Martinez M.D.;  Location: SURGERY [facility];  Service: Gyn Robotic    REPEAT C SECTION  6/18/2014    Performed by Corinne E Capurro, M.D. at LABOR AND DELIVERY    GYN SURGERY  2007    csection    OTHER  2003    L BREAST LUMPECTOMY    BLANCA BY LAPAROSCOPY      CHOLECYSTECTOMY      OTHER      gallbladder removed    OTHER      cardiac ablations x 6    OTHER CARDIAC SURGERY      6 ablation    PRIMARY C SECTION      ZZZ IMPLANTABLE CARDIOVERTER DEFIBRILLATOR (ICD)       SOCHX:  reports that she has never smoked. She has never used smokeless tobacco. She reports that she does not drink alcohol and does not use drugs.  FH: Family history was reviewed, no pertinent findings to report    Physical Exam  Vitals reviewed.   Constitutional:       General: She is not in acute distress.     Appearance: She is well-developed.   HENT:      Head: Normocephalic and atraumatic.      Right Ear: External ear normal.      Left Ear: External ear normal.      Ears:      Comments: Auricle and tragus are nontender.  Mastoids bilaterally are without tenderness, erythema or " bogginess.  Bilateral TMs are with purulent middle ear effusions with erythema worse on the right than the left.  Eyes:      Conjunctiva/sclera: Conjunctivae normal.      Pupils: Pupils are equal, round, and reactive to light.   Neck:      Trachea: No tracheal deviation.   Cardiovascular:      Rate and Rhythm: Normal rate and regular rhythm.   Pulmonary:      Effort: Pulmonary effort is normal.      Breath sounds: Normal breath sounds.   Musculoskeletal:      Cervical back: Normal range of motion and neck supple.      Comments: Moves all extremities   Skin:     General: Skin is warm.      Findings: No rash.      Comments: No rash to area exposed during the visit today.    Neurological:      Mental Status: She is alert and oriented to person, place, and time.      Coordination: Coordination normal.   Psychiatric:         Mood and Affect: Mood normal.         Behavior: Behavior normal.                                  Assessment & Plan  Acute otitis media, unspecified otitis media type    Orders:    doxycycline (VIBRAMYCIN) 100 MG Tab; Take 1 Tablet by mouth 2 times a day for 7 days.    Referral to ENT           Appears patient has had several ear infections over the last year.  Mutually agreed that referral to ENT is appropriate in the least for further discussion of plan if needed in the future.  Will start the patient on doxycycline due to allergies.    Differential diagnosis, natural history, supportive care, and indications for immediate follow-up discussed. Side effects of OTC or prescribed medications discussed.      Follow-up as needed if symptoms worsen or fail to improve to PCP, Urgent care or Emergency Room.     I have personally reviewed prior external notes and test results pertinent to today's visit.  I have independently reviewed and interpreted all diagnostics ordered during this urgent care acute visit.   Discussed management options (risks,benefits, and alternatives to treatment).    The patient  and/or guardian demonstrated a good understanding and agreed with the treatment plan. And all questions were answered.  Please note that this dictation was created using voice recognition software. I have made a reasonable attempt to correct obvious errors, but I expect that there are errors of grammar and possibly content that I did not discover before finalizing the note.

## 2025-05-13 NOTE — Clinical Note
REFERRAL APPROVAL NOTICE         Sent on May 13, 2025                   Heidi Gaspar  [address], [place], [place] [facility] [place] [address]                   Dear Ms. Gaspar,    After a careful review of the medical information and benefit coverage, [facility] has processed your referral. See below for additional details.    If applicable, you must be actively enrolled with your insurance for coverage of the authorized service. If you have any questions regarding your coverage, please contact your insurance directly.    REFERRAL INFORMATION   Referral #:  [id number]  Referred-To Provider    Referred-By Provider:  Otolaryngology    RONDA Osborne ANDREW      [address]  [address]  [facility] [id number] [phone] [address] [address]  [phone]    Referral Start Date:  05/07/2025  Referral End Date:   05/07/2026             SCHEDULING  If you do not already have an appointment, please call [phone] to make an appointment.     MORE INFORMATION  If you do not already have a [employer] account, sign up at: [email or web address]  You can access your medical information, make appointments, see lab results, billing information, and more.  If you have questions regarding this referral, please contact  the [facility] Referrals department at:             [phone]. Monday - Friday 8:00AM - 5:00PM.     Sincerely,    [facility]

## 2025-07-22 ENCOUNTER — OFFICE VISIT (OUTPATIENT)
Dept: URGENT CARE | Facility: PHYSICIAN GROUP | Age: 45
End: 2025-07-22
Payer: COMMERCIAL

## 2025-07-22 ENCOUNTER — HOSPITAL ENCOUNTER (OUTPATIENT)
Facility: MEDICAL CENTER | Age: 45
End: 2025-07-22
Attending: PHYSICIAN ASSISTANT
Payer: COMMERCIAL

## 2025-07-22 VITALS
OXYGEN SATURATION: 98 % | RESPIRATION RATE: 14 BRPM | SYSTOLIC BLOOD PRESSURE: 108 MMHG | DIASTOLIC BLOOD PRESSURE: 80 MMHG | WEIGHT: 209.9 LBS | HEART RATE: 70 BPM | TEMPERATURE: 96.7 F | HEIGHT: 64 IN | BODY MASS INDEX: 35.83 KG/M2

## 2025-07-22 DIAGNOSIS — N76.0 ACUTE VAGINITIS: ICD-10-CM

## 2025-07-22 DIAGNOSIS — R39.9 UTI SYMPTOMS: ICD-10-CM

## 2025-07-22 DIAGNOSIS — R39.9 UTI SYMPTOMS: Primary | ICD-10-CM

## 2025-07-22 LAB
AMBIGUOUS DTTM AMBI4: NORMAL
APPEARANCE UR: CLEAR
BILIRUB UR STRIP-MCNC: NORMAL MG/DL
COLOR UR AUTO: YELLOW
GLUCOSE UR STRIP.AUTO-MCNC: NEGATIVE MG/DL
KETONES UR STRIP.AUTO-MCNC: NEGATIVE MG/DL
LEUKOCYTE ESTERASE UR QL STRIP.AUTO: NEGATIVE
NITRITE UR QL STRIP.AUTO: NEGATIVE
PH UR STRIP.AUTO: 5.5 [PH] (ref 5–8)
PROT UR QL STRIP: NEGATIVE MG/DL
RBC UR QL AUTO: NEGATIVE
SP GR UR STRIP.AUTO: 1.02
UROBILINOGEN UR STRIP-MCNC: NORMAL MG/DL

## 2025-07-22 PROCEDURE — 87480 CANDIDA DNA DIR PROBE: CPT

## 2025-07-22 PROCEDURE — 81002 URINALYSIS NONAUTO W/O SCOPE: CPT | Performed by: PHYSICIAN ASSISTANT

## 2025-07-22 PROCEDURE — 3074F SYST BP LT 130 MM HG: CPT | Performed by: PHYSICIAN ASSISTANT

## 2025-07-22 PROCEDURE — 87510 GARDNER VAG DNA DIR PROBE: CPT

## 2025-07-22 PROCEDURE — 3079F DIAST BP 80-89 MM HG: CPT | Performed by: PHYSICIAN ASSISTANT

## 2025-07-22 PROCEDURE — 87086 URINE CULTURE/COLONY COUNT: CPT

## 2025-07-22 PROCEDURE — 87660 TRICHOMONAS VAGIN DIR PROBE: CPT

## 2025-07-22 PROCEDURE — 99213 OFFICE O/P EST LOW 20 MIN: CPT | Performed by: PHYSICIAN ASSISTANT

## 2025-07-22 ASSESSMENT — FIBROSIS 4 INDEX: FIB4 SCORE: 0.52

## 2025-07-22 NOTE — PROGRESS NOTES
"Subjective     Heidi Gaspar is a 45 y.o. female who presents with UTI (Burning sensation when going, uncomfortable, frequency but sometimes no pee when at the toilet./Hx bv 6 mos.)    HPI:  Heidi Gaspar is a 45 y.o. female who presents today for evaluation of possible urinary tract infection.  She reports that her symptoms started 1-1/2 weeks ago.  She has had some discomfort with urination as well as urgency of urination.  She has also noted some slight clumpy discharge at times when she wipes.  No significant vaginal itching.  She did have similar symptoms with bacterial vaginosis a few months ago.        ROS      PMH:  has a past medical history of Angina, Arrhythmia, ASTHMA, Bronchitis, Cardiac arrhythmia, Chickenpox, Influenza, Kidney, horseshoe, Cruz's syndrome, Other acquired deformity of toe, Pneumonia, PSVT (paroxysmal supraventricular tachycardia) ([facility]), S/P ablation of ventricular arrhythmia, Ramirez-Parkinson-White syndrome, and WPW (Marisela-Parkinson-White syndrome).    She has no past medical history of Backpain or Dialysis.  MEDS: Current Medications[1]  ALLERGIES: Allergies[2]  SURGHX: Past Surgical History[3]  SOCHX:  reports that she has never smoked. She has never been exposed to tobacco smoke. She has never used smokeless tobacco. She reports that she does not drink alcohol and does not use drugs.  FH: Family history was reviewed, no pertinent findings to report        Objective     /80 (BP Location: Right arm, Patient Position: Sitting, BP Cuff Size: Adult)   Pulse 70   Temp 35.9 °C (96.7 °F)   Resp 14   Ht 1.626 m (5' 4\")   Wt 95.2 kg (209 lb 14.4 oz)   SpO2 98%   BMI 36.03 kg/m²      Physical Exam  Constitutional:       General: She is not in acute distress.     Appearance: She is not diaphoretic.   HENT:      Head: Normocephalic and atraumatic.      Right Ear: External ear normal.      Left Ear: External ear normal.   Eyes:      Conjunctiva/sclera: Conjunctivae normal. "      Pupils: Pupils are equal, round, and reactive to light.   Pulmonary:      Effort: Pulmonary effort is normal. No respiratory distress.   Genitourinary:     Comments:  exam deferred.  Patient did self swab.  Musculoskeletal:      Cervical back: Normal range of motion.   Skin:     Findings: No rash.   Neurological:      Mental Status: She is alert and oriented to person, place, and time.   Psychiatric:         Mood and Affect: Mood and affect normal.         Cognition and Memory: Memory normal.         Judgment: Judgment normal.       POCT Urinalysis  Lab Results   Component Value Date/Time    POCCOLOR Yellow 07/22/2025 10:11 AM    POCAPPEAR Clear 07/22/2025 10:11 AM    POCLEUKEST Negative 07/22/2025 10:11 AM    POCNITRITE Negative 07/22/2025 10:11 AM    POCUROBILIGE 0.2 E.U./dL 07/22/2025 10:11 AM    POCPROTEIN Negative 07/22/2025 10:11 AM    POCURPH 5.5 07/22/2025 10:11 AM    POCBLOOD Negative 07/22/2025 10:11 AM    POCSPGRV 1.025 07/22/2025 10:11 AM    POCKETONES Negative 07/22/2025 10:11 AM    POCBILIRUBIN Small 07/22/2025 10:11 AM    POCGLUCUA Negative 07/22/2025 10:11 AM        Assessment & Plan     1. UTI symptoms (Primary)  - POCT Urinalysis  - URINE CULTURE(NEW); Future    2. Acute vaginitis  - VAGINAL PATHOGENS DNA PANEL; Future      Urinalysis does not show any obvious signs of urinary tract infection.  Given her persistent symptoms of discomfort with urination and urgency/frequency of urination, however, we will send out for urine culture to further evaluate for UTI.  Vaginal pathogen swab also obtained to check for yeast or BV as a potential cause of her symptoms.  She reports similar symptoms with bacterial vaginosis in the past 6 months.  Should have those results within the next 24 to 48 hours and will notify her of results and alter treatment plan accordingly at that time.          Differential Diagnosis, natural history, and supportive care discussed. Return to the Urgent Care or follow up  "with your PCP if symptoms fail to resolve, or for any new or worsening symptoms. Emergency room precautions discussed. Patient and/or family appears understanding of information.       [1]   Current Outpatient Medications:     estradiol (CLIMARA) 0.0375 MG/24HR patch, , Disp: , Rfl:     progesterone (PROMETRIUM) 100 MG Cap, , Disp: , Rfl:     cetirizine (ZYRTEC ALLERGY) 10 MG Tab, Take 1 Tablet by mouth every day., Disp: 30 Tablet, Rfl: 0    albuterol 108 (90 Base) MCG/ACT Aero Soln inhalation aerosol, Inhale 2 Puffs every four hours as needed for Shortness of Breath., Disp: 1 Each, Rfl: 0    albuterol (PROVENTIL) 2.5mg/0.5ml Nebu Soln, Take 2.5 mg by nebulization every four hours as needed., Disp: , Rfl:     fluconazole (DIFLUCAN) 150 MG tablet, Take one tablet orally for yeast infection, if symptoms persist, may repeat treatment after 72 hours. (Patient not taking: Reported on 5/7/2025), Disp: 2 Tablet, Rfl: 0    phenazopyridine (PYRIDIUM) 200 MG Tab, Take 1 Tablet by mouth 3 times a day as needed for Moderate Pain., Disp: 6 Tablet, Rfl: 0    methylPREDNISolone (MEDROL DOSEPAK) 4 MG Tablet Therapy Pack, Follow schedule on package instructions., Disp: 21 Tablet, Rfl: 0  [2]   Allergies  Allergen Reactions    Inapsine [Droperidol] Anaphylaxis    Inapsine [Droperidol] Anaphylaxis    Keflex Rash and Swelling     RASH & SWELLING    Morphine Unspecified     STOPS PEEING after \"A lot.\"      Penicillins Rash and Swelling     RASH & SWELLING   [3]   Past Surgical History:  Procedure Laterality Date    PELVISCOPY N/A 9/9/2018    Procedure: PELVISCOPY;  Surgeon: Dianne Martinez M.D.;  Location: SURGERY [facility];  Service: Gyn Robotic    OOPHORECTOMY Left 9/9/2018    Procedure: SALPINGOOPHORECTOMY;  Surgeon: Dianne Martinez M.D.;  Location: SURGERY [facility];  Service: Gyn Robotic    REPEAT C SECTION  6/18/2014    Performed by Corinne E Capurro, M.D. at LABOR AND DELIVERY    GYN SURGERY  2007    csection    OTHER "  2003    L BREAST LUMPECTOMY    BLANCA BY LAPAROSCOPY      CHOLECYSTECTOMY      OTHER      gallbladder removed    OTHER      cardiac ablations x 6    OTHER CARDIAC SURGERY      6 ablation    PRIMARY C SECTION      ZZZ IMPLANTABLE CARDIOVERTER DEFIBRILLATOR (ICD)

## 2025-07-23 ENCOUNTER — RESULTS FOLLOW-UP (OUTPATIENT)
Dept: URGENT CARE | Facility: CLINIC | Age: 45
End: 2025-07-23
Payer: COMMERCIAL

## 2025-07-23 LAB
CANDIDA DNA VAG QL PROBE+SIG AMP: NEGATIVE
G VAGINALIS DNA VAG QL PROBE+SIG AMP: NEGATIVE
T VAGINALIS DNA VAG QL PROBE+SIG AMP: NEGATIVE

## 2025-07-25 LAB
BACTERIA UR CULT: NORMAL
SIGNIFICANT IND 70042: NORMAL
SITE SITE: NORMAL
SOURCE SOURCE: NORMAL

## (undated) DEVICE — LACTATED RINGERS INJ 1000 ML - (14EA/CA 60CA/PF)

## (undated) DEVICE — CANISTER SUCTION 3000ML MECHANICAL FILTER AUTO SHUTOFF MEDI-VAC NONSTERILE LF DISP  (40EA/CA)

## (undated) DEVICE — SET SUCTION/IRRIGATION WITH DISPOSABLE TIP (6/CA )PART #0250-070-520 IS A SUB

## (undated) DEVICE — SET EXTENSION WITH 2 PORTS (48EA/CA) ***PART #2C8610 IS A SUBSTITUTE*****

## (undated) DEVICE — SUCTION INSTRUMENT YANKAUER BULBOUS TIP W/O VENT (50EA/CA)

## (undated) DEVICE — SET LEADWIRE 5 LEAD BEDSIDE DISPOSABLE ECG (1SET OF 5/EA)

## (undated) DEVICE — SENSOR SPO2 NEO LNCS ADHESIVE (20/BX) SEE USER NOTES

## (undated) DEVICE — LIGASURE 5MM BLUNT TIP LONG - 44CM (6EA/PK)

## (undated) DEVICE — GOWN WARMING STANDARD FLEX - (30/CA)

## (undated) DEVICE — SUTURE 4-0 VICRYL PLUSFS-1 - 27 INCH (36/BX)

## (undated) DEVICE — GLOVE BIOGEL INDICATOR SZ 7SURGICAL PF LTX - (50/BX 4BX/CA)

## (undated) DEVICE — TROCAR 5X100 BLADED Z-THREAD - KII (6/BX)

## (undated) DEVICE — KIT ROOM DECONTAMINATION

## (undated) DEVICE — TRAY SRGPRP PVP IOD WT PRP - (20/CA)

## (undated) DEVICE — ELECTRODE 850 FOAM ADHESIVE - HYDROGEL RADIOTRNSPRNT (50/PK)

## (undated) DEVICE — SODIUM CHL IRRIGATION 0.9% 1000ML (12EA/CA)

## (undated) DEVICE — SUTURE 0 VICRYL PLUS UR-6 - 27 INCH (36/BX)

## (undated) DEVICE — HEAD HOLDER JUNIOR/ADULT

## (undated) DEVICE — MASK ANESTHESIA ADULT  - (100/CA)

## (undated) DEVICE — NEEDLE INSFL 120MM 14GA VRRS - (20/BX)

## (undated) DEVICE — TUBING CLEARLINK DUO-VENT - C-FLO (48EA/CA)

## (undated) DEVICE — KIT ANESTHESIA W/CIRCUIT & 3/LT BAG W/FILTER (20EA/CA)

## (undated) DEVICE — PACK LAPAROSCOPY - (1/CA)

## (undated) DEVICE — TRAY FOLEY CATHETER STATLOCK 16FR SURESTEP  (10EA/CA)

## (undated) DEVICE — ARMREST CRADLE FOAM - (2PR/PK 12PR/CA)

## (undated) DEVICE — PROTECTOR ULNA NERVE - (36PR/CA)

## (undated) DEVICE — PAD SANITARY 11IN MAXI IND WRAPPED  (12EA/PK 24PK/CA)

## (undated) DEVICE — CANNULA W/SEAL11X100ZTHREAD - (12/BX)

## (undated) DEVICE — GLOVE BIOGEL SZ 7 SURGICAL PF LTX - (50PR/BX 4BX/CA)

## (undated) DEVICE — GLOVE BIOGEL SZ 6.5 SURGICAL PF LTX (50PR/BX 4BX/CA)

## (undated) DEVICE — NEPTUNE 4 PORT MANIFOLD - (20/PK)

## (undated) DEVICE — TROCAR LAPSCP 100MM 12MM NTHRD - (6/BX)

## (undated) DEVICE — SUTURE GENERAL

## (undated) DEVICE — TROCAR Z THREAD 11 X 100 - BLADED (6/BX)

## (undated) DEVICE — GOWN SURGEONS X-LARGE - DISP. (30/CA)